# Patient Record
Sex: MALE | Race: WHITE | NOT HISPANIC OR LATINO | Employment: FULL TIME | URBAN - METROPOLITAN AREA
[De-identification: names, ages, dates, MRNs, and addresses within clinical notes are randomized per-mention and may not be internally consistent; named-entity substitution may affect disease eponyms.]

---

## 2017-05-26 ENCOUNTER — ALLSCRIPTS OFFICE VISIT (OUTPATIENT)
Dept: OTHER | Facility: OTHER | Age: 42
End: 2017-05-26

## 2017-08-30 ENCOUNTER — GENERIC CONVERSION - ENCOUNTER (OUTPATIENT)
Dept: OTHER | Facility: OTHER | Age: 42
End: 2017-08-30

## 2017-09-14 ENCOUNTER — GENERIC CONVERSION - ENCOUNTER (OUTPATIENT)
Dept: OTHER | Facility: OTHER | Age: 42
End: 2017-09-14

## 2017-09-14 LAB
A/G RATIO (HISTORICAL): 2 (ref 1.2–2.2)
ALBUMIN SERPL BCP-MCNC: 4.6 G/DL (ref 3.5–5.5)
ALP SERPL-CCNC: 51 IU/L (ref 39–117)
ALT SERPL W P-5'-P-CCNC: 32 IU/L (ref 0–44)
AST SERPL W P-5'-P-CCNC: 29 IU/L (ref 0–40)
BASOPHILS # BLD AUTO: 0 X10E3/UL (ref 0–0.2)
BASOPHILS # BLD AUTO: 1 %
BILIRUB SERPL-MCNC: 1.1 MG/DL (ref 0–1.2)
BUN SERPL-MCNC: 19 MG/DL (ref 6–24)
BUN/CREA RATIO (HISTORICAL): 20 (ref 9–20)
CALCIUM SERPL-MCNC: 10.3 MG/DL (ref 8.7–10.2)
CHLORIDE SERPL-SCNC: 102 MMOL/L (ref 96–106)
CHOLEST SERPL-MCNC: 213 MG/DL (ref 100–199)
CHOLEST/HDLC SERPL: 4.6 RATIO UNITS (ref 0–5)
CO2 SERPL-SCNC: 25 MMOL/L (ref 18–29)
CREAT SERPL-MCNC: 0.96 MG/DL (ref 0.76–1.27)
DEPRECATED RDW RBC AUTO: 13.3 % (ref 12.3–15.4)
EGFR AFRICAN AMERICAN (HISTORICAL): 113 ML/MIN/1.73
EGFR-AMERICAN CALC (HISTORICAL): 98 ML/MIN/1.73
EOSINOPHIL # BLD AUTO: 0.1 X10E3/UL (ref 0–0.4)
EOSINOPHIL # BLD AUTO: 2 %
GLUCOSE SERPL-MCNC: 90 MG/DL (ref 65–99)
HCT VFR BLD AUTO: 44.3 % (ref 37.5–51)
HDLC SERPL-MCNC: 46 MG/DL
HGB BLD-MCNC: 15.5 G/DL (ref 12.6–17.7)
IMM.GRANULOCYTES (CD4/8) (HISTORICAL): 0 %
IMM.GRANULOCYTES (CD4/8) (HISTORICAL): 0 X10E3/UL (ref 0–0.1)
INTERPRETATION (HISTORICAL): NORMAL
LDLC SERPL CALC-MCNC: 136 MG/DL (ref 0–99)
LYMPHOCYTES # BLD AUTO: 2.4 X10E3/UL (ref 0.7–3.1)
LYMPHOCYTES # BLD AUTO: 54 %
MCH RBC QN AUTO: 30.7 PG (ref 26.6–33)
MCHC RBC AUTO-ENTMCNC: 35 G/DL (ref 31.5–35.7)
MCV RBC AUTO: 88 FL (ref 79–97)
MONOCYTES # BLD AUTO: 0.3 X10E3/UL (ref 0.1–0.9)
MONOCYTES (HISTORICAL): 7 %
NEUTROPHILS # BLD AUTO: 1.6 X10E3/UL (ref 1.4–7)
NEUTROPHILS # BLD AUTO: 36 %
PLATELET # BLD AUTO: 147 X10E3/UL (ref 150–379)
POTASSIUM SERPL-SCNC: 4.6 MMOL/L (ref 3.5–5.2)
RBC (HISTORICAL): 5.05 X10E6/UL (ref 4.14–5.8)
SODIUM SERPL-SCNC: 142 MMOL/L (ref 134–144)
TOT. GLOBULIN, SERUM (HISTORICAL): 2.3 G/DL (ref 1.5–4.5)
TOTAL PROTEIN (HISTORICAL): 6.9 G/DL (ref 6–8.5)
TRIGL SERPL-MCNC: 157 MG/DL (ref 0–149)
VLDLC SERPL CALC-MCNC: 31 MG/DL (ref 5–40)
WBC # BLD AUTO: 4.5 X10E3/UL (ref 3.4–10.8)

## 2017-09-15 ENCOUNTER — GENERIC CONVERSION - ENCOUNTER (OUTPATIENT)
Dept: OTHER | Facility: OTHER | Age: 42
End: 2017-09-15

## 2017-09-22 ENCOUNTER — ALLSCRIPTS OFFICE VISIT (OUTPATIENT)
Dept: OTHER | Facility: OTHER | Age: 42
End: 2017-09-22

## 2017-10-26 ENCOUNTER — ALLSCRIPTS OFFICE VISIT (OUTPATIENT)
Dept: OTHER | Facility: OTHER | Age: 42
End: 2017-10-26

## 2017-10-27 NOTE — PROGRESS NOTES
Assessment  1  Benign essential hypertension (401 1) (I10)   2  Mixed hyperlipidemia (272 2) (E78 2)   3  Never a smoker    Plan  Benign essential hypertension    · Bystolic 5 MG Oral Tablet; 1 every day    Chief Complaint  f/u blood pressure check rmklpn      History of Present Illness  pt is here to follow bPdoing well no chest pain no sob      Review of Systems    Constitutional: No fever or chills, feels well, no tiredness, no recent weight gain or weight loss  Eyes: No complaints of eye pain, no red eyes, no discharge from eyes, no itchy eyes  ENT: no complaints of earache, no hearing loss, no nosebleeds, no nasal discharge, no sore throat, no hoarseness  Cardiovascular: No complaints of slow heart rate, no fast heart rate, no chest pain, no palpitations, no leg claudication, no lower extremity  Gastrointestinal: No complaints of abdominal pain, no constipation, no nausea or vomiting, no diarrhea or bloody stools  Active Problems  1  Benign essential hypertension (401 1) (I10)   2  BMI 38 0-38 9,adult (V85 38) (Z68 38)   3  Encounter for prostate cancer screening (V76 44) (Z12 5)   4  Fatty liver disease, nonalcoholic (877 0) (A25 0)   5  Mixed hyperlipidemia (272 2) (E78 2)   6  Need for vaccination (V05 9) (Z23)   7  Obesity (278 00) (E66 9)   8  Organic impotence (607 84) (N52 9)   9  Palpitations (785 1) (R00 2)   10  Screening for diabetes mellitus (DM) (V77 1) (Z13 1)   11  Screening for thyroid disorder (V77 0) (Z13 29)   12  Well adult on routine health check (V70 0) (Z00 00)    Past Medical History  1  History of _ (272 2)   2  History of Acute upper respiratory infection (465 9) (J06 9)   3  History of Closed nondisplaced fracture of proximal phalanx of lesser toe of left foot,   initial encounter (826 0) (S92 515A)   4  History of Finger deformity, left (736 20) (M20 002)   5  History of Fracture of finger, left, closed (816 00) (S62 609A)   6  History of fatigue (V13 89) (Z87 898)   7  History of nausea and vomiting (V12 79) (Z87 898)   8  History of neoplasm of uncertain behavior of skin (V13 3) (Z86 03)   9  History of sebaceous cyst (V13 3) (Z87 2)   10  History of Joint pain, knee (719 46) (M25 569)   11  History of Left foot pain (729 5) (M79 672)   12  History of Overweight (278 02) (E66 3)   13  History of Previously Well   15  History of Vitamin D deficiency (268 9) (E55 9)    The active problems and past medical history were reviewed and updated today  Surgical History  1  Denied: History of Reported Prior Surgical / Procedural History   2  History of Wrist Surgery    The surgical history was reviewed and updated today  Family History  Mother    1  Family history of arthritis (V17 7) (Z82 61)   2  Family history of hyperlipidemia (V18 19) (Z83 49)  Father    3  Family history of hypertension (V17 49) (Z82 49)   4  Denied: Family history of malignant neoplasm of prostate  Family History    5  Denied: Family history of Cancer   6  Family history of Diabetes Mellitus (V18 0)   7  Family history of Heart Disease (V17 49)   8  Family history of Mixed Hyperlipoproteinemia    The family history was reviewed and updated today  Social History   · Being A Social Drinker   · Never a smoker   · History of Never a smoker  The social history was reviewed and updated today  Current Meds   1  Atorvastatin Calcium 10 MG Oral Tablet; 1 Every Day At Bedtime; Therapy: 71ODZ1909 to (Last Kae Gloria)  Requested for: 10EXU8884 Ordered   2  Bystolic 5 MG Oral Tablet; 1 every day; Therapy: 77OIA1860 to (Last Kae Gloria)  Requested for: 91RXX5714 Ordered   3  Magnesium 500 MG Oral Tablet; 1 TABLEY DAILY; Therapy: 29VRB5329 to Recorded   4  Multivitamins Oral Capsule; 1 tablet daily; Therapy: 49FQK2006 to Recorded    The medication list was reviewed and updated today  Allergies  1   No Known Drug Allergies    Vitals  Vital Signs    Recorded: 10EGF8327 08:52AM   Temperature 97 2 F   Heart Rate 78   Respiration 18   Systolic 640   Diastolic 84   Height 6 ft 3 in   Weight 276 lb    BMI Calculated 34 5   BSA Calculated 2 52     Physical Exam    Constitutional   General appearance: No acute distress, well appearing and well nourished  Eyes   Conjunctiva and lids: No swelling, erythema, or discharge  Ears, Nose, Mouth, and Throat   External inspection of ears and nose: Normal     Otoscopic examination: Tympanic membrance translucent with normal light reflex  Canals patent without erythema  Pulmonary   Auscultation of lungs: Clear to auscultation, equal breath sounds bilaterally, no wheezes, no rales, no rhonci  Cardiovascular   Auscultation of heart: Normal rate and rhythm, normal S1 and S2, without murmurs  Abdomen   Abdomen: Non-tender, no masses           Future Appointments    Date/Time Provider Specialty Site   12/21/2017 09:15 AM Carolina Burnham DO Family Medicine [facility]     Signatures   Electronically signed by : Norberto Vargas DO; Oct 26 2017  9:11AM EST                       (Author)

## 2018-01-10 ENCOUNTER — GENERIC CONVERSION - ENCOUNTER (OUTPATIENT)
Dept: OTHER | Facility: OTHER | Age: 43
End: 2018-01-10

## 2018-01-10 LAB
A/G RATIO (HISTORICAL): 2 (ref 1.2–2.2)
ALBUMIN SERPL BCP-MCNC: 4.6 G/DL (ref 3.5–5.5)
ALP SERPL-CCNC: 54 IU/L (ref 39–117)
ALT SERPL W P-5'-P-CCNC: 33 IU/L (ref 0–44)
AST SERPL W P-5'-P-CCNC: 29 IU/L (ref 0–40)
BILIRUB SERPL-MCNC: 1.1 MG/DL (ref 0–1.2)
BUN SERPL-MCNC: 25 MG/DL (ref 6–24)
BUN/CREA RATIO (HISTORICAL): 28 (ref 9–20)
CALCIUM SERPL-MCNC: 9.5 MG/DL (ref 8.7–10.2)
CHLORIDE SERPL-SCNC: 100 MMOL/L (ref 96–106)
CHOLEST SERPL-MCNC: 188 MG/DL (ref 100–199)
CHOLEST/HDLC SERPL: 3.8 RATIO UNITS (ref 0–5)
CO2 SERPL-SCNC: 25 MMOL/L (ref 18–29)
CREAT SERPL-MCNC: 0.88 MG/DL (ref 0.76–1.27)
EGFR AFRICAN AMERICAN (HISTORICAL): 122 ML/MIN/1.73
EGFR-AMERICAN CALC (HISTORICAL): 106 ML/MIN/1.73
GLUCOSE SERPL-MCNC: 94 MG/DL (ref 65–99)
HDLC SERPL-MCNC: 49 MG/DL
LDLC SERPL CALC-MCNC: 102 MG/DL (ref 0–99)
POTASSIUM SERPL-SCNC: 4.2 MMOL/L (ref 3.5–5.2)
SODIUM SERPL-SCNC: 144 MMOL/L (ref 134–144)
TOT. GLOBULIN, SERUM (HISTORICAL): 2.3 G/DL (ref 1.5–4.5)
TOTAL PROTEIN (HISTORICAL): 6.9 G/DL (ref 6–8.5)
TRIGL SERPL-MCNC: 186 MG/DL (ref 0–149)
VLDLC SERPL CALC-MCNC: 37 MG/DL (ref 5–40)

## 2018-01-10 NOTE — RESULT NOTES
Verified Results  [facility]) CBC+Platelet+Hem Review [id number] 07:47AM [facility]     Test Name Result Flag Reference   WBC 3 8 x10E3/uL  3 4-10 8   RBC 4 80 x10E6/uL  4 14-5 80   Hemoglobin 14 8 g/dL  12 6-17 7   Hematocrit 43 5 %  37 5-51 0   MCV 91 fL  79-97   MCH 30 8 pg  26 6-33 0   MCHC 34 0 g/dL  31 5-35 7   RDW 13 1 %  12 3-15 4   Platelets 620 G10T1/ZN  150-379   Neutrophils 45 %     Lymphs 45 %     Monocytes 6 %     Eos 3 %     Basos 1 %     Neutrophils Absolute 1 7 X10E3/uL  1 4-7 0   Lymphs (Absolute) 1 7 X10E3/uL  0 7-3 1   Monocytes(Absolute) 0 2 X10E3/uL  0 1-0 9   Eos (Absolute Value) 0 1 X10E3/uL  0 0-0 4   Baso(Absolute) 0 0 X10E3/uL  0 0-0 2   RBC Comment RBC's appear normal   Normal   Platelet Comment Comment  Adequate   Platelet count verified by examination of peripheral blood smear         Discussion/Summary   labs acceptable

## 2018-01-11 ENCOUNTER — GENERIC CONVERSION - ENCOUNTER (OUTPATIENT)
Dept: OTHER | Facility: OTHER | Age: 43
End: 2018-01-11

## 2018-01-11 LAB — INTERPRETATION (HISTORICAL): NORMAL

## 2018-01-12 VITALS
RESPIRATION RATE: 16 BRPM | TEMPERATURE: 98.8 F | WEIGHT: 282.25 LBS | BODY MASS INDEX: 35.09 KG/M2 | HEART RATE: 90 BPM | HEIGHT: 75 IN | SYSTOLIC BLOOD PRESSURE: 160 MMHG | DIASTOLIC BLOOD PRESSURE: 98 MMHG

## 2018-01-12 NOTE — RESULT NOTES
Discussion/Summary   will discuss labs at follow up appt     Verified Results  (1) CBC/PLT/DIFF [id number] 07:59AM Stacy Wilmer     Test Name Result Flag Reference   WBC 4 5 x10E3/uL  3 4-10 8   RBC 5 05 x10E6/uL  4 14-5 80   Hemoglobin 15 5 g/dL  12 6-17 7   Hematocrit 44 3 %  37 5-51 0   MCV 88 fL  79-97   MCH 30 7 pg  26 6-33 0   MCHC 35 0 g/dL  31 5-35 7   RDW 13 3 %  12 3-15 4   Platelets 221 F85Z0/TI L 150-379   Neutrophils 36 %     Lymphs 54 %     Monocytes 7 %     Eos 2 %     Basos 1 %     Neutrophils (Absolute) 1 6 x10E3/uL  1 4-7 0   Lymphs (Absolute) 2 4 x10E3/uL  0 7-3 1   Monocytes(Absolute) 0 3 x10E3/uL  0 1-0 9   Eos (Absolute) 0 1 x10E3/uL  0 0-0 4   Baso (Absolute) 0 0 x10E3/uL  0 0-0 2   Immature Granulocytes 0 %     Immature Grans (Abs) 0 0 x10E3/uL  0 0-0 1     (1) COMPREHENSIVE METABOLIC PANEL [id number] 41:12NK [employer]     Test Name Result Flag Reference   Glucose, Serum 90 mg/dL  65-99   BUN 19 mg/dL  6-24   Creatinine, Serum 0 96 mg/dL  0 76-1 27   BUN/Creatinine Ratio 20  9-20   Sodium, Serum 142 mmol/L  134-144   Potassium, Serum 4 6 mmol/L  3 5-5 2   Chloride, Serum 102 mmol/L     Carbon Dioxide, Total 25 mmol/L  18-29   Calcium, Serum 10 3 mg/dL H 8 7-10 2   Protein, Total, Serum 6 9 g/dL  6 0-8 5   Albumin, Serum 4 6 g/dL  3 5-5 5   Globulin, Total 2 3 g/dL  1 5-4 5   A/G Ratio 2 0  1 2-2 2   Bilirubin, Total 1 1 mg/dL  0 0-1 2   Alkaline Phosphatase, S 51 IU/L     AST (SGOT) 29 IU/L  0-40   ALT (SGPT) 32 IU/L  0-44   eGFR If NonAfricn Am 98 mL/min/1 73  >59   eGFR If Africn Am 113 mL/min/1 73  >59     (1) LIPID PANEL, FASTING [id number] 07:59AM Stacy Guillen     Test Name Result Flag Reference   Cholesterol, Total 213 mg/dL H 100-199   Triglycerides 157 mg/dL H 0-149   HDL Cholesterol 46 mg/dL  >39   VLDL Cholesterol Landon 31 mg/dL  5-40   LDL Cholesterol Calc 136 mg/dL H 0-99   T  Chol/HDL Ratio 4 6 ratio units  0 0-5 0   T   Chol/HDL Ratio Men  Women                                               1/2 Avg  Risk  3 4    3 3                                                   Avg Risk  5 0    4 4                                                2X Avg  Risk  9 6    7 1                                                3X Avg  Risk 23 4   11 0     [facility]) Cardiovascular Risk Assessment [id number] 07:59AM Vinnie Miller     Test Name Result Flag Reference   Interpretation Note     Supplement report is available  PDF Image

## 2018-01-12 NOTE — RESULT NOTES
Verified Results  [facility]) CMP14+eGFR [id number] 07:47AM [facility]     Test Name Result Flag Reference   Glucose, Serum 105 mg/dL H 65-99   BUN 17 mg/dL  6-24   Creatinine, Serum 0 90 mg/dL  0 76-1 27   eGFR If NonAfricn Am 106 mL/min/1 73  >59   eGFR If Africn Am 123 mL/min/1 73  >59   BUN/Creatinine Ratio 19  9-20   Sodium, Serum 144 mmol/L  134-144   Potassium, Serum 4 3 mmol/L  3 5-5 2   Chloride, Serum 106 mmol/L     Carbon Dioxide, Total 22 mmol/L  18-29   Calcium, Serum 9 9 mg/dL  8 7-10 2   Protein, Total, Serum 7 0 g/dL  6 0-8 5   Albumin, Serum 4 8 g/dL  3 5-5 5   Globulin, Total 2 2 g/dL  1 5-4 5   A/G Ratio 2 2  1 1-2 5   Bilirubin, Total 0 7 mg/dL  0 0-1 2   Alkaline Phosphatase, S 74 IU/L     AST (SGOT) 36 IU/L  0-40   ALT (SGPT) 51 IU/L H 0-44     (LC) Lipid Panel With LDL/HDL Ratio [id number] 07:47AM [facility]     Test Name Result Flag Reference   Cholesterol, Total 211 mg/dL H 100-199   Triglycerides 139 mg/dL  0-149   HDL Cholesterol 45 mg/dL  >39   According to ATP-III Guidelines, HDL-C >59 mg/dL is considered a  negative risk factor for CHD  VLDL Cholesterol Landon 28 mg/dL  5-40   LDL Cholesterol Calc 138 mg/dL H 0-99   LDL/HDL Ratio 3 1 ratio units  0 0-3 6   LDL/HDL Ratio                                                             Men  Women                                               1/2 Avg  Risk  1 0    1 5                                                   Avg Risk  3 6    3 2                                                2X Avg  Risk  6 2    5 0                                                3X Avg  Risk  8 0    6 1     (LC) PSA Total (Reflex To Free) [id number] 07:47AM [facility]     Test Name Result Flag Reference   Prostate Specific Ag, Serum 0 4 ng/mL  0 0-4 0   Roche ECLIA methodology  According to the American Urological Association, Serum PSA should  decrease and remain at undetectable levels after radical  prostatectomy   The AUA defines biochemical recurrence as an initial  PSA value 0 2 ng/mL or greater followed by a subsequent confirmatory  PSA value 0 2 ng/mL or greater  Values obtained with different assay methods or kits cannot be used  interchangeably  Results cannot be interpreted as absolute evidence  of the presence or absence of malignant disease  Reflex Criteria Comment     The percent free PSA is performed on a reflex basis only when the  total PSA is between 4 0 and 10 0 ng/mL       [facility]) TSH+Free T4 [id number] 07:47AM Stacy Guillen     Test Name Result Flag Reference   TSH 1 420 uIU/mL  0 450-4 500   T4,Free(Direct) 1 22 ng/dL  0 82-1 77       Discussion/Summary   Will discuss labs at follow up appt     Signatures   Electronically signed by : Catarina Bloch, DO; Feb 10 2016  9:54AM EST                       (Author)

## 2018-01-13 VITALS
TEMPERATURE: 97.2 F | BODY MASS INDEX: 34.32 KG/M2 | DIASTOLIC BLOOD PRESSURE: 84 MMHG | WEIGHT: 276 LBS | HEIGHT: 75 IN | SYSTOLIC BLOOD PRESSURE: 126 MMHG | RESPIRATION RATE: 18 BRPM | HEART RATE: 78 BPM

## 2018-01-14 NOTE — RESULT NOTES
Verified Results  [facility]) Cardiovascular Risk Assessment [id number] 07:47AM Steve Carrillo     Test Name Result Flag Reference   Interpretation Note     Supplement report is available  PDF Image            Signatures   Electronically signed by : Berenice Zarate DO; Feb 11 2016  9:48AM EST                       (Author)

## 2018-01-14 NOTE — PROGRESS NOTES
Assessment    1  Never a smoker   2  Benign essential hypertension (401 1) (I10)   3  Palpitations (785 1) (R00 2)   4  Fatty liver disease, nonalcoholic (650 3) (T59 9)   5  Mixed hyperlipidemia (272 2) (E78 2)    Plan  Benign essential hypertension    · Bystolic 5 MG Oral Tablet; 1 every day   · Follow-up visit in 1 month Evaluation and Treatment  Follow-up  Status: Hold For -  Scheduling  Requested for: 43Ffj0954  Mixed hyperlipidemia    · Atorvastatin Calcium 10 MG Oral Tablet; 1 Every Day At Bedtime   · (1) COMPREHENSIVE METABOLIC PANEL; Status:Active; Requested for:84Pjj6778;    · (1) LIPID PANEL, FASTING; Status:Active; Requested for:23Prk0438;    · Follow-up visit in 3 months Evaluation and Treatment  Follow-up  Status: Hold For -  Scheduling  Requested for: 73DVD1233  Palpitations    · EKG/ECG- POC; Status:Complete;   Done: 47OHU8133 09:56AM    Discussion/Summary    Will get ekg for the palpitations  would suggest BB for his blood pressure    i will sit on the palps as they may happen once or twice over a number of month  the bb will probaby quell this  Chief Complaint  pt present for CPE, no forms  af/rma      History of Present Illness  HM, Adult Male: The patient is being seen for a health maintenance evaluation  General Health:   Screening:   HPI: pt is here for a full physical  no new issues at this time    pt states over the last few months he complains of having palpitations in is chest randomly that come on and go away after a few seconds  Review of Systems    Constitutional: No fever or chills, feels well, no tiredness, no recent weight gain or weight loss  Eyes: No complaints of eye pain, no red eyes, no discharge from eyes, no itchy eyes  ENT: no complaints of earache, no hearing loss, no nosebleeds, no nasal discharge, no sore throat, no hoarseness  Cardiovascular: palpitations, but as noted in HPI and no chest pain     Respiratory: No complaints of shortness of breath, no wheezing, no cough, no SOB on exertion, no orthopnea or PND  Gastrointestinal: No complaints of abdominal pain, no constipation, no nausea or vomiting, no diarrhea or bloody stools  Genitourinary: No complaints of dysuria, no incontinence, no hesitancy, no nocturia, no genital lesion, no testicular pain  Musculoskeletal: No complaints of arthralgia, no myalgias, no joint swelling or stiffness, no limb pain or swelling  Active Problems    1  Benign essential hypertension (401 1) (I10)   2  BMI 38 0-38 9,adult (V85 38) (Z68 38)   3  Encounter for prostate cancer screening (V76 44) (Z12 5)   4  Fatty liver disease, nonalcoholic (947 6) (T02 0)   5  Mixed hyperlipidemia (272 2) (E78 2)   6  Need for vaccination (V05 9) (Z23)   7  Obesity (278 00) (E66 9)   8  Organic impotence (607 84) (N52 9)   9  Screening for diabetes mellitus (DM) (V77 1) (Z13 1)   10  Screening for thyroid disorder (V77 0) (Z13 29)   11   Well adult on routine health check (V70 0) (Z00 00)    Past Medical History    · History of _ (272 2)   · History of Acute upper respiratory infection (465 9) (J06 9)   · History of Closed nondisplaced fracture of proximal phalanx of lesser toe of left foot,  initial encounter (826 0) (S92 515A)   · History of Finger deformity, left (736 20) (M20 002)   · History of Fracture of finger, left, closed (816 00) (S62 609A)   · History of fatigue (V13 89) (V23 356)   · History of nausea and vomiting (V12 79) (M61 821)   · History of neoplasm of uncertain behavior of skin (V13 3) (Z86 03)   · History of sebaceous cyst (V13 3) (Z87 2)   · History of Joint pain, knee (719 46) (M25 569)   · History of Left foot pain (729 5) (M79 672)   · History of Overweight (278 02) (E66 3)   · History of Previously Well   · History of Vitamin D deficiency (268 9) (E55 9)    Surgical History    · Denied: History of Reported Prior Surgical / Procedural History   · History of Wrist Surgery    Family History  Mother    · Family history of arthritis (V17 7) (Z82 61)   · Family history of hyperlipidemia (V18 19) (Z83 49)  Father    · Family history of hypertension (V17 49) (Z82 49)   · Denied: Family history of malignant neoplasm of prostate  Family History    · Denied: Family history of Cancer   · Family history of Diabetes Mellitus (V18 0)   · Family history of Heart Disease (V17 49)   · Family history of Mixed Hyperlipoproteinemia    Social History    · Being A Social Drinker   · Never a smoker    Current Meds   1  Magnesium 500 MG Oral Tablet; 1 TABLEY DAILY; Therapy: 25YFG6150 to Recorded   2  Multivitamins Oral Capsule; 1 tablet daily; Therapy: 90WYY5495 to Recorded    Allergies    1  No Known Drug Allergies    Vitals   Recorded: 74XPY8556 09:20AM   Temperature 96 8 F   Heart Rate 82   Respiration 16   Systolic 431   Diastolic 88   Height 6 ft 3 in   Weight 276 lb    BMI Calculated 34 5   BSA Calculated 2 52     Physical Exam    Constitutional   General appearance: No acute distress, well appearing and well nourished  Head and Face   Head and face: Normal     Palpation of the face and sinuses: No sinus tenderness  Eyes   Conjunctiva and lids: No erythema, swelling or discharge  Pupils and irises: Equal, round, reactive to light  Ears, Nose, Mouth, and Throat   Otoscopic examination: Tympanic membranes translucent with normal light reflex  Canals patent without erythema  Neck   Neck: Supple, symmetric, trachea midline, no masses  Pulmonary   Respiratory effort: No increased work of breathing or signs of respiratory distress  Percussion of chest: Normal     Cardiovascular   Palpation of heart: Normal PMI, no thrills  Auscultation of heart: Normal rate and rhythm, normal S1 and S2, no murmurs  Abdomen   Abdomen: Non-tender, no masses  Liver and spleen: No hepatomegaly or splenomegaly  Lymphatic   Palpation of lymph nodes in neck: No lymphadenopathy      Palpation of lymph nodes in axillae: No lymphadenopathy  Palpation of lymph nodes in groin: No lymphadenopathy  Palpation of lymph nodes in other areas: No lymphadenopathy  Musculoskeletal   Gait and station: Normal     Inspection/palpation of digits and nails: Normal without clubbing or cyanosis  Inspection/palpation of joints, bones, and muscles: Normal     Skin   Skin and subcutaneous tissue: Normal without rashes or lesions         Results/Data  Parag [id number] 09:58AM [facility]     Test Name Result Flag Reference   [place] - Ten Year 2 9 %       EKG/ECG- POC [id number] 09:56AM [facility]     Test Name Result Flag Reference   EKG/ECG      NSR no signs of ischemia     (1) CBC/PLT/DIFF [id number] 07:59AM [facility] [facility]     Test Name Result Flag Reference   WBC 4 5 x10E3/uL  3 4-10 8   RBC 5 05 x10E6/uL  4 14-5 80   Hemoglobin 15 5 g/dL  12 6-17 7   Hematocrit 44 3 %  37 5-51 0   MCV 88 fL  79-97   MCH 30 7 pg  26 6-33 0   MCHC 35 0 g/dL  31 5-35 7   RDW 13 3 %  12 3-15 4   Platelets 035 S56Z6/CG L 150-379   Neutrophils 36 %     Lymphs 54 %     Monocytes 7 %     Eos 2 %     Basos 1 %     Neutrophils (Absolute) 1 6 x10E3/uL  1 4-7 0   Lymphs (Absolute) 2 4 x10E3/uL  0 7-3 1   Monocytes(Absolute) 0 3 x10E3/uL  0 1-0 9   Eos (Absolute) 0 1 x10E3/uL  0 0-0 4   Baso (Absolute) 0 0 x10E3/uL  0 0-0 2   Immature Granulocytes 0 %     Immature Grans (Abs) 0 0 x10E3/uL  0 0-0 1     (1) COMPREHENSIVE METABOLIC PANEL [id number] 12:47MB [employer]     Test Name Result Flag Reference   Glucose, Serum 90 mg/dL  65-99   BUN 19 mg/dL  6-24   Creatinine, Serum 0 96 mg/dL  0 76-1 27   BUN/Creatinine Ratio 20  9-20   Sodium, Serum 142 mmol/L  134-144   Potassium, Serum 4 6 mmol/L  3 5-5 2   Chloride, Serum 102 mmol/L     Carbon Dioxide, Total 25 mmol/L  18-29   Calcium, Serum 10 3 mg/dL H 8 7-10 2   Protein, Total, Serum 6 9 g/dL  6 0-8 5   Albumin, Serum 4 6 g/dL  3 5-5 5   Globulin, Total 2 3 g/dL  1 5-4 5   A/G Ratio 2 0  1 2-2 2   Bilirubin, Total 1 1 mg/dL  0 0-1 2   Alkaline Phosphatase, S 51 IU/L     AST (SGOT) 29 IU/L  0-40   ALT (SGPT) 32 IU/L  0-44   eGFR If NonAfricn Am 98 mL/min/1 73  >59   eGFR If Africn Am 113 mL/min/1 73  >59     (1) LIPID PANEL, FASTING [id number] 07:59AM [facility]     Test Name Result Flag Reference   Cholesterol, Total 213 mg/dL H 100-199   Triglycerides 157 mg/dL H 0-149   HDL Cholesterol 46 mg/dL  >39   VLDL Cholesterol Landon 31 mg/dL  5-40   LDL Cholesterol Calc 136 mg/dL H 0-99   T  Chol/HDL Ratio 4 6 ratio units  0 0-5 0   T  Chol/HDL Ratio                                                             Men  Women                                               1/2 Avg  Risk  3 4    3 3                                                   Avg Risk  5 0    4 4                                                2X Avg  Risk  9 6    7 1                                                3X Avg  Risk 23 4   11 0       Procedure    Procedure: Visual Acuity Test    Inforrmation supplied by a Snellen chart     Results: 20/13 in both eyes without corrective device, 20/15 in the right eye without corrective device, 20/15 in the left eye without corrective device      Signatures   Electronically signed by : Curly Harden DO; Sep 22 2017 10:01AM EST                       (Author)

## 2018-01-15 NOTE — RESULT NOTES
Verified Results  [facility]) CMP14+eGFR [id number] 07:42AM Stacy Guillen     Test Name Result Flag Reference   Glucose, Serum 105 mg/dL H 65-99   BUN 19 mg/dL  6-24   Creatinine, Serum 1 04 mg/dL  0 76-1 27   eGFR If NonAfricn Am 89 mL/min/1 73  >59   eGFR If Africn Am 103 mL/min/1 73  >59   BUN/Creatinine Ratio 18  9-20   Sodium, Serum 141 mmol/L  134-144   Potassium, Serum 3 7 mmol/L  3 5-5 2   Chloride, Serum 103 mmol/L     Carbon Dioxide, Total 22 mmol/L  18-29   Calcium, Serum 10 0 mg/dL  8 7-10 2   Protein, Total, Serum 6 7 g/dL  6 0-8 5   Albumin, Serum 4 5 g/dL  3 5-5 5   Globulin, Total 2 2 g/dL  1 5-4 5   A/G Ratio 2 0  1 1-2 5   Bilirubin, Total 1 0 mg/dL  0 0-1 2   Alkaline Phosphatase, S 59 IU/L     AST (SGOT) 34 IU/L  0-40   ALT (SGPT) 44 IU/L  0-44     (LC) Lipid Panel With LDL/HDL Ratio [id number] 07:42AM Stacy Wilmer     Test Name Result Flag Reference   Cholesterol, Total 176 mg/dL  100-199   Triglycerides 153 mg/dL H 0-149   HDL Cholesterol 43 mg/dL  >39   According to ATP-III Guidelines, HDL-C >59 mg/dL is considered a  negative risk factor for CHD  VLDL Cholesterol Landon 31 mg/dL  5-40   LDL Cholesterol Calc 102 mg/dL H 0-99   LDL/HDL Ratio 2 4 ratio units  0 0-3 6   LDL/HDL Ratio                                                             Men  Women                                               1/2 Avg  Risk  1 0    1 5                                                   Avg Risk  3 6    3 2                                                2X Avg  Risk  6 2    5 0                                                3X Avg  Risk  8 0    6 1     [facility]) Cardiovascular Risk Assessment [id number] 07:42AM Stacy Wilmer     Test Name Result Flag Reference   Interpretation Note     Supplement report is available  PDF Image            Discussion/Summary   Will discuss labs at follow up appt

## 2018-01-16 ENCOUNTER — GENERIC CONVERSION - ENCOUNTER (OUTPATIENT)
Dept: OTHER | Facility: OTHER | Age: 43
End: 2018-01-16

## 2018-01-22 VITALS
WEIGHT: 276 LBS | BODY MASS INDEX: 34.32 KG/M2 | SYSTOLIC BLOOD PRESSURE: 142 MMHG | RESPIRATION RATE: 16 BRPM | HEIGHT: 75 IN | HEART RATE: 82 BPM | TEMPERATURE: 96.8 F | DIASTOLIC BLOOD PRESSURE: 88 MMHG

## 2018-01-24 VITALS
SYSTOLIC BLOOD PRESSURE: 140 MMHG | RESPIRATION RATE: 18 BRPM | BODY MASS INDEX: 34.57 KG/M2 | HEIGHT: 75 IN | WEIGHT: 278 LBS | HEART RATE: 76 BPM | DIASTOLIC BLOOD PRESSURE: 80 MMHG | TEMPERATURE: 98 F

## 2018-02-15 ENCOUNTER — HOSPITAL ENCOUNTER (EMERGENCY)
Facility: HOSPITAL | Age: 43
Discharge: HOME | End: 2018-02-15
Attending: EMERGENCY MEDICINE
Payer: COMMERCIAL

## 2018-02-15 ENCOUNTER — APPOINTMENT (EMERGENCY)
Dept: NON INVASIVE DIAGNOSTICS | Facility: HOSPITAL | Age: 43
End: 2018-02-15
Attending: INTERNAL MEDICINE
Payer: COMMERCIAL

## 2018-02-15 ENCOUNTER — APPOINTMENT (EMERGENCY)
Dept: RADIOLOGY | Facility: HOSPITAL | Age: 43
End: 2018-02-15
Payer: COMMERCIAL

## 2018-02-15 ENCOUNTER — APPOINTMENT (EMERGENCY)
Dept: NON INVASIVE DIAGNOSTICS | Facility: HOSPITAL | Age: 43
End: 2018-02-15
Payer: COMMERCIAL

## 2018-02-15 VITALS
OXYGEN SATURATION: 96 % | SYSTOLIC BLOOD PRESSURE: 146 MMHG | BODY MASS INDEX: 34.25 KG/M2 | TEMPERATURE: 97.4 F | WEIGHT: 274 LBS | HEART RATE: 46 BPM | RESPIRATION RATE: 15 BRPM | DIASTOLIC BLOOD PRESSURE: 83 MMHG

## 2018-02-15 DIAGNOSIS — I48.91 ATRIAL FIBRILLATION (HCC): Primary | ICD-10-CM

## 2018-02-15 LAB
ALBUMIN SERPL BCP-MCNC: 4.6 G/DL (ref 3.5–5)
ALP SERPL-CCNC: 67 U/L (ref 46–116)
ALT SERPL W P-5'-P-CCNC: 46 U/L (ref 12–78)
AMPHETAMINES SERPL QL SCN: NEGATIVE
ANION GAP SERPL CALCULATED.3IONS-SCNC: 8 MMOL/L (ref 4–13)
APTT PPP: 25 SECONDS (ref 23–35)
AST SERPL W P-5'-P-CCNC: 27 U/L (ref 5–45)
BARBITURATES UR QL: NEGATIVE
BASOPHILS # BLD AUTO: 0 THOUSANDS/ΜL (ref 0–0.1)
BASOPHILS NFR BLD AUTO: 1 % (ref 0–1)
BENZODIAZ UR QL: NEGATIVE
BILIRUB SERPL-MCNC: 0.9 MG/DL (ref 0.2–1)
BUN SERPL-MCNC: 21 MG/DL (ref 5–25)
CALCIUM SERPL-MCNC: 10 MG/DL (ref 8.3–10.1)
CHLORIDE SERPL-SCNC: 107 MMOL/L (ref 100–108)
CO2 SERPL-SCNC: 29 MMOL/L (ref 21–32)
COCAINE UR QL: NEGATIVE
CREAT SERPL-MCNC: 0.99 MG/DL (ref 0.6–1.3)
EOSINOPHIL # BLD AUTO: 0.1 THOUSAND/ΜL (ref 0–0.61)
EOSINOPHIL NFR BLD AUTO: 2 % (ref 0–6)
ERYTHROCYTE [DISTWIDTH] IN BLOOD BY AUTOMATED COUNT: 13.3 % (ref 11.6–15.1)
GFR SERPL CREATININE-BSD FRML MDRD: 94 ML/MIN/1.73SQ M
GLUCOSE SERPL-MCNC: 104 MG/DL (ref 65–140)
HCT VFR BLD AUTO: 48.2 % (ref 42–52)
HGB BLD-MCNC: 16 G/DL (ref 14–18)
INR PPP: 1.04 (ref 0.86–1.16)
LYMPHOCYTES # BLD AUTO: 3.6 THOUSANDS/ΜL (ref 0.6–4.47)
LYMPHOCYTES NFR BLD AUTO: 57 % (ref 14–44)
MAGNESIUM SERPL-MCNC: 2.2 MG/DL (ref 1.6–2.6)
MCH RBC QN AUTO: 31 PG (ref 27–31)
MCHC RBC AUTO-ENTMCNC: 33.3 G/DL (ref 31.4–37.4)
MCV RBC AUTO: 93 FL (ref 82–98)
METHADONE UR QL: NEGATIVE
MONOCYTES # BLD AUTO: 0.5 THOUSAND/ΜL (ref 0.17–1.22)
MONOCYTES NFR BLD AUTO: 7 % (ref 4–12)
NEUTROPHILS # BLD AUTO: 2.2 THOUSANDS/ΜL (ref 1.85–7.62)
NEUTS SEG NFR BLD AUTO: 34 % (ref 43–75)
NRBC BLD AUTO-RTO: 0 /100 WBCS
OPIATES UR QL SCN: NEGATIVE
PCP UR QL: NEGATIVE
PLATELET # BLD AUTO: 156 THOUSANDS/UL (ref 130–400)
PLATELET BLD QL SMEAR: ADEQUATE
PMV BLD AUTO: 9 FL (ref 8.9–12.7)
POTASSIUM SERPL-SCNC: 3.4 MMOL/L (ref 3.5–5.3)
PROT SERPL-MCNC: 7.6 G/DL (ref 6.4–8.2)
PROTHROMBIN TIME: 10.9 SECONDS (ref 9.4–11.7)
RBC # BLD AUTO: 5.18 MILLION/UL (ref 4.7–6.1)
SODIUM SERPL-SCNC: 144 MMOL/L (ref 136–145)
THC UR QL: NEGATIVE
TROPONIN I SERPL-MCNC: <0.02 NG/ML
TROPONIN I SERPL-MCNC: <0.02 NG/ML
TSH SERPL DL<=0.05 MIU/L-ACNC: 3.8 UIU/ML (ref 0.36–3.74)
WBC # BLD AUTO: 6.4 THOUSAND/UL (ref 4.8–10.8)

## 2018-02-15 PROCEDURE — 96360 HYDRATION IV INFUSION INIT: CPT

## 2018-02-15 PROCEDURE — 99285 EMERGENCY DEPT VISIT HI MDM: CPT

## 2018-02-15 PROCEDURE — 93306 TTE W/DOPPLER COMPLETE: CPT | Performed by: INTERNAL MEDICINE

## 2018-02-15 PROCEDURE — 93005 ELECTROCARDIOGRAM TRACING: CPT | Performed by: EMERGENCY MEDICINE

## 2018-02-15 PROCEDURE — 93017 CV STRESS TEST TRACING ONLY: CPT

## 2018-02-15 PROCEDURE — 36415 COLL VENOUS BLD VENIPUNCTURE: CPT | Performed by: EMERGENCY MEDICINE

## 2018-02-15 PROCEDURE — 93016 CV STRESS TEST SUPVJ ONLY: CPT | Performed by: INTERNAL MEDICINE

## 2018-02-15 PROCEDURE — 96372 THER/PROPH/DIAG INJ SC/IM: CPT

## 2018-02-15 PROCEDURE — 99284 EMERGENCY DEPT VISIT MOD MDM: CPT | Performed by: INTERNAL MEDICINE

## 2018-02-15 PROCEDURE — 71045 X-RAY EXAM CHEST 1 VIEW: CPT

## 2018-02-15 PROCEDURE — 80307 DRUG TEST PRSMV CHEM ANLYZR: CPT | Performed by: EMERGENCY MEDICINE

## 2018-02-15 PROCEDURE — 80053 COMPREHEN METABOLIC PANEL: CPT | Performed by: EMERGENCY MEDICINE

## 2018-02-15 PROCEDURE — 84484 ASSAY OF TROPONIN QUANT: CPT | Performed by: EMERGENCY MEDICINE

## 2018-02-15 PROCEDURE — 93018 CV STRESS TEST I&R ONLY: CPT | Performed by: INTERNAL MEDICINE

## 2018-02-15 PROCEDURE — 83735 ASSAY OF MAGNESIUM: CPT | Performed by: EMERGENCY MEDICINE

## 2018-02-15 PROCEDURE — 93005 ELECTROCARDIOGRAM TRACING: CPT

## 2018-02-15 PROCEDURE — 96361 HYDRATE IV INFUSION ADD-ON: CPT

## 2018-02-15 PROCEDURE — 85610 PROTHROMBIN TIME: CPT | Performed by: EMERGENCY MEDICINE

## 2018-02-15 PROCEDURE — 93306 TTE W/DOPPLER COMPLETE: CPT

## 2018-02-15 PROCEDURE — 84443 ASSAY THYROID STIM HORMONE: CPT | Performed by: EMERGENCY MEDICINE

## 2018-02-15 PROCEDURE — 85730 THROMBOPLASTIN TIME PARTIAL: CPT | Performed by: EMERGENCY MEDICINE

## 2018-02-15 PROCEDURE — 85025 COMPLETE CBC W/AUTO DIFF WBC: CPT | Performed by: EMERGENCY MEDICINE

## 2018-02-15 RX ORDER — FLECAINIDE ACETATE 50 MG/1
50 TABLET ORAL EVERY 12 HOURS SCHEDULED
Status: DISCONTINUED | OUTPATIENT
Start: 2018-02-15 | End: 2018-02-15 | Stop reason: HOSPADM

## 2018-02-15 RX ORDER — NEBIVOLOL 10 MG/1
TABLET ORAL DAILY
COMMUNITY
Start: 2017-09-22 | End: 2018-02-22

## 2018-02-15 RX ORDER — FLECAINIDE ACETATE 50 MG/1
50 TABLET ORAL 2 TIMES DAILY
Qty: 28 TABLET | Refills: 0 | Status: SHIPPED | OUTPATIENT
Start: 2018-02-15 | End: 2018-03-05 | Stop reason: SDUPTHER

## 2018-02-15 RX ORDER — POTASSIUM CHLORIDE 20 MEQ/1
40 TABLET, EXTENDED RELEASE ORAL ONCE
Status: COMPLETED | OUTPATIENT
Start: 2018-02-15 | End: 2018-02-15

## 2018-02-15 RX ORDER — ATORVASTATIN CALCIUM 20 MG/1
TABLET, FILM COATED ORAL DAILY
COMMUNITY
Start: 2017-09-22 | End: 2018-08-21 | Stop reason: SDUPTHER

## 2018-02-15 RX ORDER — FOLIC ACID 0.8 MG
TABLET ORAL
COMMUNITY
Start: 2017-09-22

## 2018-02-15 RX ORDER — ASPIRIN 81 MG/1
324 TABLET, CHEWABLE ORAL DAILY
Qty: 20 TABLET | Refills: 0 | Status: SHIPPED | OUTPATIENT
Start: 2018-02-15 | End: 2020-03-04

## 2018-02-15 RX ADMIN — ENOXAPARIN SODIUM 120 MG: 120 INJECTION SUBCUTANEOUS at 05:05

## 2018-02-15 RX ADMIN — Medication 400 MG: at 11:04

## 2018-02-15 RX ADMIN — POTASSIUM CHLORIDE 40 MEQ: 1500 TABLET, EXTENDED RELEASE ORAL at 11:04

## 2018-02-15 RX ADMIN — SODIUM CHLORIDE 1000 ML: 0.9 INJECTION, SOLUTION INTRAVENOUS at 03:36

## 2018-02-15 RX ADMIN — FLECAINIDE ACETATE 50 MG: 50 TABLET ORAL at 11:02

## 2018-02-15 NOTE — ED NOTES
Awake , alert   Denies pain  Heart rate 50   States he is a runner  Resp regular and easy       Rodrick Hay, ROE  02/15/18 1412

## 2018-02-15 NOTE — DISCHARGE INSTRUCTIONS
A-fib (Atrial Fibrillation)   WHAT YOU NEED TO KNOW:   A-fib may come and go, or it may be a long-term condition  A-fib can cause blood clots, stroke, or heart failure  These conditions may become life-threatening  It is important to treat and manage a-fib to help prevent a blood clot, stroke, or heart failure  DISCHARGE INSTRUCTIONS:   Call 911 for any of the following:   · You have any of the following signs of a heart attack:      ¨ Squeezing, pressure, or pain in your chest that lasts longer than 5 minutes or returns    ¨ Discomfort or pain in your back, neck, jaw, stomach, or arm     ¨ Trouble breathing    ¨ Nausea or vomiting    ¨ Lightheadedness or a sudden cold sweat, especially with chest pain or trouble breathing    · You have any of the following signs of a stroke:      ¨ Numbness or drooping on one side of your face     ¨ Weakness in an arm or leg    ¨ Confusion or difficulty speaking    ¨ Dizziness, a severe headache, or vision loss  Return to the emergency department if:  You have any of the following signs of a blood clot:  · You feel lightheaded, are short of breath, and have chest pain  · You cough up blood  · You have swelling, redness, pain, or warmth in your arm or leg  Contact your cardiologist or healthcare provider if:   · Your target heart rate is not in the range it should be  · You have new or worsening swelling in your legs, feet, ankles, or abdomen  · You are short of breath, even at rest      · You have questions or concerns about your condition or care  Medicines: You may need any of the following:  · Heart medicines  help control your heart rate and rhythm  You may need more than one medicine to treat your symptoms  · Blood thinners    help prevent blood clots  Examples of blood thinners include heparin and warfarin  Clots can cause strokes, heart attacks, and death   The following are general safety guidelines to follow while you are taking a blood thinner:    ¨ Watch for bleeding and bruising while you take blood thinners  Watch for bleeding from your gums or nose  Watch for blood in your urine and bowel movements  Use a soft washcloth on your skin, and a soft toothbrush to brush your teeth  This can keep your skin and gums from bleeding  If you shave, use an electric shaver  Do not play contact sports  ¨ Tell your dentist and other healthcare providers that you take anticoagulants  Wear a bracelet or necklace that says you take this medicine  ¨ Do not start or stop any medicines unless your healthcare provider tells you to  Many medicines cannot be used with blood thinners  ¨ Tell your healthcare provider right away if you forget to take the medicine, or if you take too much  ¨ Warfarin  is a blood thinner that you may need to take  The following are things you should be aware of if you take warfarin  § Foods and medicines can affect the amount of warfarin in your blood  Do not make major changes to your diet while you take warfarin  Warfarin works best when you eat about the same amount of vitamin K every day  Vitamin K is found in green leafy vegetables and certain other foods  Ask for more information about what to eat when you are taking warfarin  § You will need to see your healthcare provider for follow-up visits when you are on warfarin  You will need regular blood tests  These tests are used to decide how much medicine you need  · Antiplatelets , such as aspirin, help prevent blood clots  Take your antiplatelet medicine exactly as directed  These medicines make it more likely for you to bleed or bruise  If you are told to take aspirin, do not take acetaminophen or ibuprofen instead  · Take your medicine as directed  Contact your healthcare provider if you think your medicine is not helping or if you have side effects  Tell him or her if you are allergic to any medicine   Keep a list of the medicines, vitamins, and herbs you take  Include the amounts, and when and why you take them  Bring the list or the pill bottles to follow-up visits  Carry your medicine list with you in case of an emergency  Follow up with your cardiologist as directed: You will need regular blood tests and monitoring  Write down your questions so you remember to ask them during your visits  Manage A-fib:   · Know your target heart rate  Learn how to take your pulse and monitor your heart rate  · Manage other health conditions  This includes high blood pressure, sleep apnea, thyroid disease, diabetes, and other heart conditions  Take medicine as directed and follow your treatment plan  · Limit or do not drink alcohol  Alcohol can make a-fib hard to manage  Ask your healthcare provider if it is safe for you to drink alcohol  A drink of alcohol is 12 ounces of beer, 5 ounces of wine, or 1½ ounces of liquor  · Do not smoke  Nicotine and other chemicals in cigarettes and cigars can cause heart and lung damage  Ask your healthcare provider for information if you currently smoke and need help to quit  E-cigarettes or smokeless tobacco still contain nicotine  Talk to your healthcare provider before you use these products  · Eat heart-healthy foods  Heart healthy foods will help keep your cholesterol low  These include fruits, vegetables, whole-grain breads, low-fat dairy products, beans, lean meats, and fish  Replace butter and margarine with heart-healthy oils such as olive oil and canola oil  · Maintain a healthy weight  Ask your healthcare provider how much you should weigh  Ask him to help you create a weight loss plan if you are overweight  · Exercise for 30 minutes  most days of the week  Ask your healthcare provider about the best exercise plan for you  © 2017 [address] [employer] Information is for End User's use only and may not be sold, redistributed or otherwise used for commercial purposes   All illustrations and images included in CareNotes® are the copyrighted property of [employer] M [employer]  or Clint Valenzuela  The above information is an  only  It is not intended as medical advice for individual conditions or treatments  Talk to your doctor, nurse or pharmacist before following any medical regimen to see if it is safe and effective for you

## 2018-02-15 NOTE — ED PROVIDER NOTES
History  Chief Complaint   Patient presents with    Palpitations     Pt states that he woke up about 30 hour ago with palpitations, pt state this has happened before but it is normally brief  Pt in ER with palpitations that woke him from sleep  Pt with a hx of similar symptoms - transient and with no outpt workup  He denies chest pain or SOB            Prior to Admission Medications   Prescriptions Last Dose Informant Patient Reported? Taking? Magnesium 500 MG CAPS   Yes Yes   Sig: Take by mouth   Multiple Vitamin (MULTI VITAMIN DAILY PO)   Yes Yes   Sig: Take 1 tablet by mouth daily   atorvastatin (LIPITOR) 20 mg tablet   Yes Yes   Sig: Take by mouth daily     nebivolol (BYSTOLIC) 10 mg tablet   Yes Yes   Sig: Take by mouth daily      Facility-Administered Medications: None       Past Medical History:   Diagnosis Date    Atrial fibrillation (HCC)     Benign essential hypertension     H/O nausea and vomiting     H/O vitamin D deficiency     Hyperlipidemia     Hypertension     Mixed hyperlipidemia        History reviewed  No pertinent surgical history  Family History   Problem Relation Age of Onset    Arthritis Mother     Hypertension Father     Prostate cancer Father     Cancer Family     Diabetes Family     Heart disease Family     Other Family      hyperlipoprotein     I have reviewed and agree with the history as documented  Social History   Substance Use Topics    Smoking status: Never Smoker    Smokeless tobacco: Never Used    Alcohol use Yes      Comment: Occ        Review of Systems   Constitutional: Negative for chills and fever  Respiratory: Negative for cough, choking, shortness of breath and wheezing  Cardiovascular: Positive for palpitations  Negative for chest pain and leg swelling  All other systems reviewed and are negative        Physical Exam  ED Triage Vitals   Temperature Pulse Respirations Blood Pressure SpO2   02/15/18 0330 02/15/18 0321 02/15/18 0321 02/15/18 0330 02/15/18 0321   (!) 97 4 °F (36 3 °C) (!) 126 16 (!) 170/117 96 %      Temp Source Heart Rate Source Patient Position - Orthostatic VS BP Location FiO2 (%)   02/15/18 0330 02/15/18 0321 02/15/18 0330 02/15/18 0330 --   Tympanic Monitor Sitting Left arm       Pain Score       02/15/18 0321       No Pain           Orthostatic Vital Signs  Vitals:    02/15/18 0815 02/15/18 1030 02/15/18 1045 02/15/18 1100   BP: 110/67 150/71 150/87 146/83   Pulse: (!) 50 (!) 54 (!) 52 (!) 46   Patient Position - Orthostatic VS:           Physical Exam   Constitutional: He is oriented to person, place, and time  He appears well-developed and well-nourished  HENT:   Head: Normocephalic and atraumatic  Eyes: EOM are normal  Pupils are equal, round, and reactive to light  Cardiovascular: S1 normal, S2 normal and normal heart sounds  An irregularly irregular rhythm present  Tachycardia present  Pulmonary/Chest: Effort normal and breath sounds normal    Abdominal: Soft  Bowel sounds are normal  He exhibits no distension and no mass  There is no tenderness  There is no rebound and no guarding  Neurological: He is alert and oriented to person, place, and time  Skin: Skin is warm and dry  Psychiatric: He has a normal mood and affect  His behavior is normal  Judgment and thought content normal    Nursing note and vitals reviewed        ED Medications  Medications   sodium chloride 0 9 % bolus 1,000 mL (0 mL Intravenous Stopped 2/15/18 0541)   enoxaparin (LOVENOX) subcutaneous injection 120 mg (120 mg Subcutaneous Given 2/15/18 0505)   potassium chloride (K-DUR,KLOR-CON) CR tablet 40 mEq (40 mEq Oral Given 2/15/18 1104)       Diagnostic Studies  Results Reviewed     Procedure Component Value Units Date/Time    Troponin I [[id number]]  (Normal) Collected:  02/15/18 0634    Lab Status:  Final result Specimen:  Blood from Arm, Left Updated:  02/15/18 0704     Troponin I <0 02 ng/mL     Narrative:         [employer] analyzer 99% cutoff is > 0 04 ng/mL in network labs    o cTnI 99% cutoff is useful only when applied to patients in the clinical setting of myocardial ischemia  o cTnI 99% cutoff should be interpreted in the context of clinical history, ECG findings and possibly cardiac imaging to establish correct diagnosis  o cTnI 99% cutoff may be suggestive but clearly not indicative of a coronary event without the clinical setting of myocardial ischemia  TSH [[id number]]  (Abnormal) Collected:  02/15/18 0331    Lab Status:  Final result Specimen:  Blood from Arm, Left Updated:  02/15/18 0400     TSH 3RD GENERATON 3 804 (H) uIU/mL     Narrative:         Patients undergoing fluorescein dye angiography may retain small amounts of fluorescein in the body for 48-72 hours post procedure  Samples containing fluorescein can produce falsely depressed TSH values  If the patient had this procedure,a specimen should be resubmitted post fluorescein clearance  Magnesium [[id number]]  (Normal) Collected:  02/15/18 0331    Lab Status:  Final result Specimen:  Blood from Arm, Left Updated:  02/15/18 0400     Magnesium 2 2 mg/dL     Troponin I [[id number]]  (Normal) Collected:  02/15/18 0331    Lab Status:  Final result Specimen:  Blood from Arm, Left Updated:  02/15/18 0359     Troponin I <0 02 ng/mL     Narrative:         [employer] analyzer 99% cutoff is > 0 04 ng/mL in network labs    o cTnI 99% cutoff is useful only when applied to patients in the clinical setting of myocardial ischemia  o cTnI 99% cutoff should be interpreted in the context of clinical history, ECG findings and possibly cardiac imaging to establish correct diagnosis  o cTnI 99% cutoff may be suggestive but clearly not indicative of a coronary event without the clinical setting of myocardial ischemia      Rapid drug screen, urine [[id number]]  (Normal) Collected:  02/15/18 0338    Lab Status:  Final result Specimen:  Urine from Urine, Clean Catch Updated:  02/15/18 7176     Amph/Meth UR Negative     Barbiturate Ur Negative     Benzodiazepine Urine Negative     Cocaine Urine Negative     Methadone Urine Negative     Opiate Urine Negative     PCP Ur Negative     THC Urine Negative    Narrative:         FOR MEDICAL PURPOSES ONLY  IF CONFIRMATION NEEDED PLEASE CONTACT THE LAB WITHIN 5 DAYS  Drug Screen Cutoff Levels:  AMPHETAMINE/METHAMPHETAMINES  1000 ng/mL  BARBITURATES     200 ng/mL  BENZODIAZEPINES     200 ng/mL  COCAINE      300 ng/mL  METHADONE      300 ng/mL  OPIATES      300 ng/mL  PHENCYCLIDINE     25 ng/mL  THC       50 ng/mL    Comprehensive metabolic panel [[id number]]  (Abnormal) Collected:  02/15/18 0331    Lab Status:  Final result Specimen:  Blood from Arm, Left Updated:  02/15/18 0352     Sodium 144 mmol/L      Potassium 3 4 (L) mmol/L      Chloride 107 mmol/L      CO2 29 mmol/L      Anion Gap 8 mmol/L      BUN 21 mg/dL      Creatinine 0 99 mg/dL      Glucose 104 mg/dL      Calcium 10 0 mg/dL      AST 27 U/L      ALT 46 U/L      Alkaline Phosphatase 67 U/L      Total Protein 7 6 g/dL      Albumin 4 6 g/dL      Total Bilirubin 0 90 mg/dL      eGFR 94 ml/min/1 73sq m     Narrative:         National Kidney Disease Education Program recommendations are as follows:  GFR calculation is accurate only with a steady state creatinine  Chronic Kidney disease less than 60 ml/min/1 73 sq  meters  Kidney failure less than 15 ml/min/1 73 sq  meters      CBC and differential [[id number]]  (Abnormal) Collected:  02/15/18 0331    Lab Status:  Final result Specimen:  Blood from Arm, Left Updated:  02/15/18 0351     WBC 6 40 Thousand/uL      RBC 5 18 Million/uL      Hemoglobin 16 0 g/dL      Hematocrit 48 2 %      MCV 93 fL      MCH 31 0 pg      MCHC 33 3 g/dL      RDW 13 3 %      MPV 9 0 fL      Platelets 055 Thousands/uL      nRBC 0 /100 WBCs      Neutrophils Relative 34 (L) %      Lymphocytes Relative 57 (H) %      Monocytes Relative 7 %      Eosinophils Relative 2 % Basophils Relative 1 %      Neutrophils Absolute 2 20 Thousands/µL      Lymphocytes Absolute 3 60 Thousands/µL      Monocytes Absolute 0 50 Thousand/µL      Eosinophils Absolute 0 10 Thousand/µL      Basophils Absolute 0 00 Thousands/µL     Protime-INR [[id number]]  (Normal) Collected:  02/15/18 0331    Lab Status:  Final result Specimen:  Blood from Arm, Left Updated:  02/15/18 0350     Protime 10 9 seconds      INR 1 04    APTT [[id number]]  (Normal) Collected:  02/15/18 0331    Lab Status:  Final result Specimen:  Blood from Arm, Left Updated:  02/15/18 0350     PTT 25 seconds     Narrative: Therapeutic Heparin Range = 60-90 seconds                 X-ray chest 1 view portable   Final Result by Dariel Goodwin MD (02/15 4291)      No active pulmonary disease        Workstation performed: [id number]                    Procedures  ECG 12 Lead Documentation  Date/Time: 2/15/2018 3:39 AM  Performed by: Kamilla Garcia by: Michel Taveras     Indications / Diagnosis:  Palpitations  ECG reviewed by me, the ED Provider: yes    Patient location:  ED  Previous ECG:     Previous ECG:  Unavailable    Comparison to cardiac monitor: Yes    Interpretation:     Interpretation: abnormal    Rate:     ECG rate:  118bpm    ECG rate assessment: tachycardic    Rhythm:     Rhythm: atrial fibrillation    ECG 12 Lead Documentation  Date/Time: 2/15/2018 3:40 AM  Performed by: Kamilla Garcia by: Michel Taveras     Indications / Diagnosis:  Palpitations  ECG reviewed by me, the ED Provider: yes    Patient location:  ED  Previous ECG:     Previous ECG:  Compared to current    Similarity:  Changes noted    Comparison to cardiac monitor: Yes    Interpretation:     Interpretation: normal    Rate:     ECG rate:  60bm    ECG rate assessment: normal    Rhythm:     Rhythm: sinus rhythm             Phone Contacts  ED Phone Contact    ED Course  ED Course                                [facility]  Number of Diagnoses or Management Options  Diagnosis management comments: Pt is stable at this time  D/w Dr Brendan Coats (cardio)  He states that pt will be seen in the ER by cardiology, recommends 1 dose of lovenox and a repeat trop  Pt informed and agrees with plan    CritCare Time    Disposition  Final diagnoses:   Atrial fibrillation ([address] )     Time reflects when diagnosis was documented in both MDM as applicable and the Disposition within this note     Time User Action Codes Description Comment    2/15/2018  8:36 AM Mahogany Diaz Add [I48 91] Atrial fibrillation [facility])       ED Disposition     ED Disposition Condition Comment    Discharge  Isabelle Stringer discharge to home/self care      Condition at discharge: Stable        Follow-up Information     Follow up With Specialties Details Why Contact Info Additional [facility] DO [facility] Schedule an appointment as soon as possible for a visit  [address] [place] [address] E       395 [facility] Emergency Department Emergency Medicine  If symptoms worsen 49 [facility]  [phone] [facility], [facility] [place], [address]    Lucila Carter MD Cardiology Schedule an appointment as soon as possible for a visit  Kassi Lancaster  [phone]           Discharge Medication List as of 2/15/2018  1:04 PM      START taking these medications    Details   aspirin 81 mg chewable tablet Chew 4 tablets (324 mg total) daily, Starting u 2/15/2018, Print      flecainide (TAMBOCOR) 50 mg tablet Take 1 tablet (50 mg total) by mouth 2 (two) times a day for 14 days, Starting u 2/15/2018, Until u 3/1/2018, Print         CONTINUE these medications which have NOT CHANGED    Details   atorvastatin (LIPITOR) 20 mg tablet Take by mouth, Starting Fri 9/22/2017, Historical Med      Magnesium 500 MG CAPS Take by mouth, Starting Fri 9/22/2017, Historical Med      Multiple Vitamin (MULTI VITAMIN DAILY PO) Take 1 tablet by mouth daily, Starting Fri 9/22/2017, Historical Med      nebivolol (BYSTOLIC) 10 mg tablet Take by mouth daily, Starting Fri 9/22/2017, Historical Med           No discharge procedures on file      ED Provider  Electronically Signed by           Art Huang DO  02/20/18 5941

## 2018-02-15 NOTE — CONSULTS
ER CARDIOLOGY CONSULTATION  Collette Shutters 43 y o  male MRN: [id number]  Unit/Bed#: ED 07 Encounter: [id number]      History of Present Illness   Physician Requesting Consult: Moni Diaz DO  Reason for Consult / Principal Problem: palpitations    Assessment/Plan   Atrial fibrillation with rvr- symptomatic  Reports prior episode  [id number]  Normal TSh  Denies alcohol  No clear hx of sleep apnea  Idiopathic? RVR with usage of bystolic 37JM daily  Symptomatic- will try anti-arrhythmic  Will add on flecainide 50 mg twice a day with bystolic  Normal QRS/Qtc  Plan to rule out ischemia prior to starting  Exercise treadmill prior to discharge  Follow-up with me in one week for consideration of event monitor + possible consideration for ablation long term    Chest pain- secondary to palpitations  Plan for exercise treadmill prior to discharge  Intermediate risk  Htn- controlled    Hld- atorvastatin        HPI: Collette Shutters is a 43y o  year old male who presents with history of hypertension, hyperlipidemia, family history of myocardial infarction presents with severe palpitations and chest discomfort awaking him from sleep at 3:00 a m  He reports previously feeling some brief flutters in his chest months ago but never sustained  Most of the times flutters would go away with a deep breath  This a m  he awoke with severe palpitations which did not resolve until arriving to the ED  He denies having any syncope  He denies having any edema  He denies any recent fevers or chills  He denies any recent nausea vomiting or GI illnesses  He denies any recent travels  He denies significant alcohol usage  He reports having some weight loss in the last couple of months  He denies significant snoring or daytime sleepiness  Since arriving to the ER he has had no palpitations or any chest discomfort  He reports being very active running and playing flag football without having any chest tightness        Historical Information   Past Medical History:   Diagnosis Date    Hyperlipidemia     Hypertension      History reviewed  No pertinent surgical history  History   Alcohol Use    Yes     Comment: Occ     History   Drug Use No     History   Smoking Status    Never Smoker   Smokeless Tobacco    Never Used     History reviewed  No pertinent family history  Meds/Allergies   Prior to Admission medications    Medication Sig Start Date End Date Taking? Authorizing Provider   atorvastatin (LIPITOR) 20 mg tablet Take by mouth 9/22/17  Yes Historical Provider, MD   Magnesium 500 MG CAPS Take by mouth 9/22/17  Yes Historical Provider, MD   Multiple Vitamin (MULTI VITAMIN DAILY PO) Take 1 tablet by mouth daily 9/22/17  Yes Historical Provider, MD   nebivolol (BYSTOLIC) 10 mg tablet Take by mouth daily 9/22/17  Yes Historical Provider, MD     Current Facility-Administered Medications   Medication Dose Route Frequency Provider Last Rate Last Dose    flecainide (TAMBOCOR) tablet 50 mg  50 mg Oral Q12H Trino Rod MD         Current Outpatient Prescriptions   Medication Sig Dispense Refill    atorvastatin (LIPITOR) 20 mg tablet Take by mouth      Magnesium 500 MG CAPS Take by mouth      Multiple Vitamin (MULTI VITAMIN DAILY PO) Take 1 tablet by mouth daily      nebivolol (BYSTOLIC) 10 mg tablet Take by mouth daily       No Known Allergies    Review of systems  CONSTITUTIONAL:  No weight loss, fever, chills, weakness or fatigue  HEENT:  Eyes:  No visual loss, blurred vision, double vision or yellow sclerae  Ears, Nose, Throat:  No hearing loss, sneezing, congestion, runny nose or sore throat  SKIN:  No rash or itching  CARDIOVASCULAR:  As per HPI  RESPIRATORY:  No shortness of breath, cough or sputum  GASTROINTESTINAL:  No anorexia, nausea, vomiting or diarrhea  No abdominal pain or blood  GENITOURINARY:  Burning on urination  No flank pain    NEUROLOGICAL:  No headache, dizziness, syncope, paralysis, ataxia, numbness or tingling in the extremities  No change in bowel or bladder control  MUSCULOSKELETAL:  No muscle, back pain, joint pain or stiffness  HEMATOLOGIC:  No anemia, bleeding or bruising  LYMPHATICS:  No enlarged nodes  No history of splenectomy  PSYCHIATRIC:  No active suicidal or homicidal ideation  ENDOCRINOLOGIC:  No reports of sweating, cold or heat intolerance  No polyuria or polydipsia  ALLERGIES:  No history of asthma, hives, eczema or rhinitis  More than 10 systems reviewed and otherwise noncontributory  Objective   Vitals: Blood pressure 110/67, pulse (!) 50, temperature (!) 97 4 °F (36 3 °C), temperature source Tympanic, resp  rate 15, weight 124 kg (274 lb), SpO2 94 %  Physical Exam   Constitutional: He is oriented to person, place, and time  He appears well-developed and well-nourished  No distress  HENT:   Head: Normocephalic and atraumatic  Right Ear: External ear normal    Left Ear: External ear normal    Nose: Nose normal    Mouth/Throat: No oropharyngeal exudate  Eyes: Conjunctivae are normal  Pupils are equal, round, and reactive to light  Right eye exhibits no discharge  Left eye exhibits no discharge  No scleral icterus  Neck: Normal range of motion  No JVD present  No tracheal deviation present  No thyromegaly present  Cardiovascular: Normal rate, regular rhythm and intact distal pulses  Exam reveals no gallop and no friction rub  No murmur heard  Pulmonary/Chest: Effort normal and breath sounds normal  No stridor  No respiratory distress  He has no wheezes  He has no rales  He exhibits no tenderness  Abdominal: Soft  Bowel sounds are normal  He exhibits no distension and no mass  There is no tenderness  There is no rebound and no guarding  Genitourinary:   Genitourinary Comments: No CVA tenderness   Musculoskeletal: He exhibits deformity  He exhibits no edema or tenderness  Neurological: He is alert and oriented to person, place, and time  He displays normal reflexes  He exhibits normal muscle tone  Skin: Skin is warm and dry  No rash noted  He is not diaphoretic  No erythema  Psychiatric: He has a normal mood and affect  His behavior is normal  Judgment and thought content normal    Nursing note and vitals reviewed        Recent Results (from the past 24 hour(s))   Comprehensive metabolic panel    Collection Time: 02/15/18  3:31 AM   Result Value Ref Range    Sodium 144 136 - 145 mmol/L    Potassium 3 4 (L) 3 5 - 5 3 mmol/L    Chloride 107 100 - 108 mmol/L    CO2 29 21 - 32 mmol/L    Anion Gap 8 4 - 13 mmol/L    BUN 21 5 - 25 mg/dL    Creatinine 0 99 0 60 - 1 30 mg/dL    Glucose 104 65 - 140 mg/dL    Calcium 10 0 8 3 - 10 1 mg/dL    AST 27 5 - 45 U/L    ALT 46 12 - 78 U/L    Alkaline Phosphatase 67 46 - 116 U/L    Total Protein 7 6 6 4 - 8 2 g/dL    Albumin 4 6 3 5 - 5 0 g/dL    Total Bilirubin 0 90 0 20 - 1 00 mg/dL    eGFR 94 ml/min/1 73sq m   CBC and differential    Collection Time: 02/15/18  3:31 AM   Result Value Ref Range    WBC 6 40 4 80 - 10 80 Thousand/uL    RBC 5 18 4 70 - 6 10 Million/uL    Hemoglobin 16 0 14 0 - 18 0 g/dL    Hematocrit 48 2 42 0 - 52 0 %    MCV 93 82 - 98 fL    MCH 31 0 27 0 - 31 0 pg    MCHC 33 3 31 4 - 37 4 g/dL    RDW 13 3 11 6 - 15 1 %    MPV 9 0 8 9 - 12 7 fL    Platelets 642 594 - 031 Thousands/uL    nRBC 0 /100 WBCs    Neutrophils Relative 34 (L) 43 - 75 %    Lymphocytes Relative 57 (H) 14 - 44 %    Monocytes Relative 7 4 - 12 %    Eosinophils Relative 2 0 - 6 %    Basophils Relative 1 0 - 1 %    Neutrophils Absolute 2 20 1 85 - 7 62 Thousands/µL    Lymphocytes Absolute 3 60 0 60 - 4 47 Thousands/µL    Monocytes Absolute 0 50 0 17 - 1 22 Thousand/µL    Eosinophils Absolute 0 10 0 00 - 0 61 Thousand/µL    Basophils Absolute 0 00 0 00 - 0 10 Thousands/µL   Protime-INR    Collection Time: 02/15/18  3:31 AM   Result Value Ref Range    Protime 10 9 9 4 - 11 7 seconds    INR 1 04 0 86 - 1 16   APTT    Collection Time: 02/15/18  3:31 AM   Result Value Ref Range    PTT 25 23 - 35 seconds   Troponin I    Collection Time: 02/15/18  3:31 AM   Result Value Ref Range    Troponin I <0 02 <=0 04 ng/mL   TSH    Collection Time: 02/15/18  3:31 AM   Result Value Ref Range    TSH 3RD GENERATON 3 804 (H) 0 358 - 3 740 uIU/mL   Magnesium    Collection Time: 02/15/18  3:31 AM   Result Value Ref Range    Magnesium 2 2 1 6 - 2 6 mg/dL   Smear Review(Phlebs Do Not Order)    Collection Time: 02/15/18  3:31 AM   Result Value Ref Range    Platelet Estimate Adequate Adequate   Rapid drug screen, urine    Collection Time: 02/15/18  3:38 AM   Result Value Ref Range    Amph/Meth UR Negative Negative    Barbiturate Ur Negative Negative    Benzodiazepine Urine Negative Negative    Cocaine Urine Negative Negative    Methadone Urine Negative Negative    Opiate Urine Negative Negative    PCP Ur Negative Negative    THC Urine Negative Negative   Troponin I    Collection Time: 02/15/18  6:34 AM   Result Value Ref Range    Troponin I <0 02 <=0 04 ng/mL     Imaging: I have personally reviewed pertinent films in PACS    Cardiac testing:   Results for orders placed during the hospital encounter of 02/15/18   Echo complete with contrast if indicated    Narrative An 39  [facility] [facility] [phone]    Transthoracic Echocardiogram  2D, M-mode, Doppler, and Color Doppler    Study date:  15-Feb-2018    Patient: Isacc Knox  MR number: [id number]  Account number: [de-identified]  : 1975  Age: 43 years  Gender: Male  Status: Routine  Location: Emergency room  Height: 75 in  Weight: 273 5 lb  BP: 63000/ 73 mmHg    Indications: A Fib , Abnormal heart sound    Diagnoses: I48 0 - Atrial fibrillation    Sonographer:  TIFFANIE Gaffney  Primary Physician:  Yael Rogers Saas:  [facility]  Interpreting Physician:  Irasema Tinajero MD    SUMMARY    LEFT VENTRICLE:  Systolic function was normal by Teichholz   Ejection fraction was estimated in the range of 50 % to 55 % to be 52 %  There were no regional wall motion abnormalities  Wall thickness was at the upper limits of normal     LEFT ATRIUM:  The atrium was mildly to moderately dilated  RIGHT ATRIUM:  The atrium was mildly dilated  HISTORY: PRIOR HISTORY: HTN, Hyperlipidemia    PROCEDURE: The procedure was performed in the emergency room  This was a routine study  The transthoracic approach was used  The study included complete 2D imaging, M-mode, complete spectral Doppler, and color Doppler  The heart rate was  51 bpm, at the start of the study  Images were obtained from the parasternal, apical, subcostal, and suprasternal notch acoustic windows  Image quality was adequate  LEFT VENTRICLE: Size was normal  Systolic function was normal by Teichholz  Ejection fraction was estimated in the range of 50 % to 55 % to be 52 %  There were no regional wall motion abnormalities  Wall thickness was at the upper limits  of normal  No evidence of apical thrombus  DOPPLER: Left ventricular diastolic function parameters were normal     RIGHT VENTRICLE: The size was normal  Systolic function was normal  Wall thickness was normal     LEFT ATRIUM: The atrium was mildly to moderately dilated  RIGHT ATRIUM: The atrium was mildly dilated  MITRAL VALVE: Valve structure was normal  There was normal leaflet separation  DOPPLER: The transmitral velocity was within the normal range  There was no evidence for stenosis  There was no significant regurgitation  AORTIC VALVE: The valve was trileaflet  Leaflets exhibited mildly increased thickness and normal cuspal separation  DOPPLER: Transaortic velocity was within the normal range  There was no evidence for stenosis  There was no significant  regurgitation  TRICUSPID VALVE: The valve structure was normal  There was normal leaflet separation  DOPPLER: The transtricuspid velocity was within the normal range   There was no evidence for stenosis  There was no significant regurgitation  PULMONIC VALVE: Leaflets exhibited normal thickness, no calcification, and normal cuspal separation  DOPPLER: The transpulmonic velocity was within the normal range  There was no significant regurgitation  PERICARDIUM: There was no pericardial effusion  The pericardium was normal in appearance  AORTA: The root exhibited normal size  SYSTEMIC VEINS: IVC: The inferior vena cava was normal in size  SYSTEM MEASUREMENT TABLES    2D mode  AoR Diam 2D: 3 4 cm  LA Diam (2D): 4 5 cm  LA/Ao (2D): 1 32  FS (2D Teich): 27 4 %  IVSd (2D): 0 97 cm  LVDEV: 146 cm³  LVEDV MOD BP: 180 cm³  LVESV: 68 8 cm³  LVIDd(2D): 5 47 cm  LVISd (2D): 3 97 cm  LVOT Area 2D: 3 14 cm squared  LVPWd (2D): 1 11 cm  SV (Teich): 77 2 cm³    Apical four chamber  LVEF A4C: 54 %    Apical two chamber  LA Area: 26 4 cm squared  LA Volume: 99 cm³  LVEF A2C: 52 %    Unspecified Scan Mode  NATALIE Cont Eq (Peak Bonifacio): 2 61 cm squared  LVOT Diam : 2 cm  LVOT Vmax: 1170 mm/s  LVOT Vmax; Mean: 1170 mm/s  Peak Grad ; Mean: 5 mm[Hg]  MV Peak A Bonifacio: 578 mm/s  MV Peak E Bonifacio  Mean: 884 mm/s  MVA (PHT): 4 31 cm squared  PHT: 51 ms  RA Area: 19 6 cm squared  RA Volume: 57 4 cm³  TAPSE: 2 3 cm    [facility]    Prepared and electronically signed by    Ivonne Kilgore MD  Signed 15-Feb-2018 10:12:16         EKG: atrial fibrillation with rvr nonspecific st changes qtc 490             Normal sinus rhythm no acute st/t wave changes qtc 450    Counseling / Coordination of Care  Total time spent today 60 minutes  Greater than 50% of total time was spent with the patient and / or family counseling and / or coordination of care  afib with rvr  Ivonne Kilgore MD [facility]      "This note has been constructed using a voice recognition system  Therefore there may be syntax, spelling, and/or grammatical errors   Please call if you have any questions  "

## 2018-02-16 NOTE — ED CARE HANDOFF
Emergency Department Sign Out Note        Sign out and transfer of care from Dr Micki Arteaga  See Separate Emergency Department note  I reviewed patient's EKG labs and chest x-ray  Delayne Cabot arrived in the ED and evaluated the patient  Patient got a transthoracic echocardiogram   He also got a stress test   Both of the tests were reviewed by Dr Thomas Zhou  He recommended discharging the patient with flecainide and aspirin and follow-up in his clinic in 1 week  Patient verbalized understanding of instructions had no further questions at the time of discharge  ED Course      Procedures  MDM  CritCare Time      Disposition  Final diagnoses:   Atrial fibrillation ([address] )     Time reflects when diagnosis was documented in both MDM as applicable and the Disposition within this note     Time User Action Codes Description Comment    2/15/2018  8:36 AM Kandy Diaz Add [I48 91] Atrial fibrillation [facility])       ED Disposition     ED Disposition Condition Comment    Discharge  Dorie Spenser discharge to home/self care      Condition at discharge: Stable        Follow-up Information     Follow up With Specialties Details Why Contact Info Additional [facility] DO [facility] Schedule an appointment as soon as possible for a visit  [address] [place] [address] E       [address] [facility] Emergency Department Emergency Medicine  If symptoms worsen [facility]  [phone] [facility], [place], [place], [address]    Leopoldo Boas, MD Cardiology Schedule an appointment as soon as possible for a visit  Kassi [facility]  [phone]           Discharge Medication List as of 2/15/2018  1:04 PM      START taking these medications    Details   aspirin 81 mg chewable tablet Chew 4 tablets (324 mg total) daily, Starting Thu 2/15/2018, Print      flecainide (TAMBOCOR) 50 mg tablet Take 1 tablet (50 mg total) by mouth 2 (two) times a day for 14 days, Starting Thu 2/15/2018, Until Thu 3/1/2018, Print         CONTINUE these medications which have NOT CHANGED    Details   atorvastatin (LIPITOR) 20 mg tablet Take by mouth, Starting Fri 9/22/2017, Historical Med      Magnesium 500 MG CAPS Take by mouth, Starting Fri 9/22/2017, Historical Med      Multiple Vitamin (MULTI VITAMIN DAILY PO) Take 1 tablet by mouth daily, Starting Fri 9/22/2017, Historical Med      nebivolol (BYSTOLIC) 10 mg tablet Take by mouth daily, Starting Fri 9/22/2017, Historical Med           No discharge procedures on file         ED Provider  Electronically Signed by     Sirisha Kilgore DO  02/16/18 [id number]

## 2018-02-17 LAB
ATRIAL RATE: 107 BPM
ATRIAL RATE: 60 BPM
P AXIS: 23 DEGREES
PR INTERVAL: 188 MS
QRS AXIS: -5 DEGREES
QRS AXIS: 0 DEGREES
QRSD INTERVAL: 100 MS
QRSD INTERVAL: 98 MS
QT INTERVAL: 352 MS
QT INTERVAL: 450 MS
QTC INTERVAL: 450 MS
QTC INTERVAL: 493 MS
T WAVE AXIS: -28 DEGREES
T WAVE AXIS: 25 DEGREES
VENTRICULAR RATE: 118 BPM
VENTRICULAR RATE: 60 BPM

## 2018-02-17 PROCEDURE — 93010 ELECTROCARDIOGRAM REPORT: CPT | Performed by: INTERNAL MEDICINE

## 2018-02-19 ENCOUNTER — OFFICE VISIT (OUTPATIENT)
Dept: FAMILY MEDICINE CLINIC | Facility: CLINIC | Age: 43
End: 2018-02-19
Payer: COMMERCIAL

## 2018-02-19 VITALS
TEMPERATURE: 98.1 F | WEIGHT: 286 LBS | SYSTOLIC BLOOD PRESSURE: 138 MMHG | BODY MASS INDEX: 35.56 KG/M2 | RESPIRATION RATE: 20 BRPM | HEIGHT: 75 IN | HEART RATE: 60 BPM | DIASTOLIC BLOOD PRESSURE: 84 MMHG

## 2018-02-19 DIAGNOSIS — J06.9 ACUTE UPPER RESPIRATORY INFECTION: Primary | ICD-10-CM

## 2018-02-19 PROBLEM — R00.2 PALPITATIONS: Status: ACTIVE | Noted: 2017-09-22

## 2018-02-19 PROBLEM — I48.91 ATRIAL FIBRILLATION (HCC): Status: ACTIVE | Noted: 2018-02-19

## 2018-02-19 PROCEDURE — 3725F SCREEN DEPRESSION PERFORMED: CPT | Performed by: NURSE PRACTITIONER

## 2018-02-19 PROCEDURE — 99213 OFFICE O/P EST LOW 20 MIN: CPT | Performed by: NURSE PRACTITIONER

## 2018-02-19 RX ORDER — AMOXICILLIN 875 MG/1
875 TABLET, COATED ORAL 2 TIMES DAILY
Qty: 20 TABLET | Refills: 0 | Status: SHIPPED | OUTPATIENT
Start: 2018-02-19 | End: 2018-03-01

## 2018-02-19 NOTE — PATIENT INSTRUCTIONS
Drinking plenty of fluids, saline nasal rinses or nasal spray, and steam or cool air humidification are all effective ways of managing symptoms  May take Mucinex D for sinus congestion, or Coricidin HBP if you have a heart condition or high blood pressure  Mucinex or Robitussin DM are used to control cough symptoms and include an expectorant  May take Ibuprofen or Tylenol as needed for pain or fevers  Recommend recheck if symptoms do not resolve or improve within 1 week

## 2018-02-19 NOTE — PROGRESS NOTES
Assessment/Plan:    No problem-specific Assessment & Plan notes found for this encounter  Diagnoses and all orders for this visit:    Acute upper respiratory infection  -     amoxicillin (AMOXIL) 875 mg tablet; Take 1 tablet (875 mg total) by mouth 2 (two) times a day for 10 days          There are no Patient Instructions on file for this visit  No Follow-up on file  Subjective:      Patient ID: Yuliya Moise is a 43 y o  male  Chief Complaint   Patient presents with    Cough     Started one week ago       He has had cold symptoms for over a week  He has a worsening cough and with increased mucous  Cough is now painful  Denies fevers, wheezing, or SOB  Not taking anything OTC for symptoms  Son is sick as well  Was in the ER last week for atrial fibrillation, seeing Dr Kenan Sheridan later this week  The following portions of the patient's history were reviewed and updated as appropriate: allergies, current medications, past family history, past medical history, past social history, past surgical history and problem list     Review of Systems   Constitutional: Negative for chills, fatigue and fever  HENT: Positive for congestion  Negative for ear pain, postnasal drip, rhinorrhea, sinus pressure and sore throat  Respiratory: Positive for cough  Negative for shortness of breath and wheezing  Cardiovascular: Negative for chest pain  Gastrointestinal: Negative for abdominal pain, diarrhea, nausea and vomiting  Musculoskeletal: Negative for arthralgias  Skin: Negative for rash  Neurological: Positive for headaches           Current Outpatient Prescriptions   Medication Sig Dispense Refill    aspirin 81 mg chewable tablet Chew 4 tablets (324 mg total) daily 20 tablet 0    atorvastatin (LIPITOR) 20 mg tablet Take by mouth daily        flecainide (TAMBOCOR) 50 mg tablet Take 1 tablet (50 mg total) by mouth 2 (two) times a day for 14 days 28 tablet 0    Magnesium 500 MG CAPS Take by mouth      Multiple Vitamin (MULTI VITAMIN DAILY PO) Take 1 tablet by mouth daily      nebivolol (BYSTOLIC) 10 mg tablet Take by mouth daily      amoxicillin (AMOXIL) 875 mg tablet Take 1 tablet (875 mg total) by mouth 2 (two) times a day for 10 days 20 tablet 0     No current facility-administered medications for this visit  Objective:    /84   Pulse 60   Temp 98 1 °F (36 7 °C)   Resp 20   Ht 6' 3" (1 905 m)   Wt 130 kg (286 lb)   BMI 35 75 kg/m²        Physical Exam   Constitutional: He appears well-developed and well-nourished  HENT:   Right Ear: External ear and ear canal normal  Tympanic membrane is bulging  Left Ear: External ear and ear canal normal  Tympanic membrane is bulging  Nose: Mucosal edema present  Mouth/Throat: Oropharynx is clear and moist and mucous membranes are normal    Eyes: Conjunctivae are normal    Cardiovascular: Normal rate, regular rhythm and normal heart sounds  Pulmonary/Chest: Effort normal and breath sounds normal    Abdominal: Bowel sounds are normal  He exhibits no distension  There is no splenomegaly or hepatomegaly  There is no tenderness  Lymphadenopathy:        Right cervical: No superficial cervical adenopathy present  Left cervical: No superficial cervical adenopathy present  Skin: No rash noted  Psychiatric: He has a normal mood and affect                Gwendolyn Burton

## 2018-02-22 ENCOUNTER — OFFICE VISIT (OUTPATIENT)
Dept: CARDIOLOGY CLINIC | Facility: CLINIC | Age: 43
End: 2018-02-22
Payer: COMMERCIAL

## 2018-02-22 VITALS
HEART RATE: 47 BPM | WEIGHT: 289 LBS | BODY MASS INDEX: 35.93 KG/M2 | OXYGEN SATURATION: 98 % | HEIGHT: 75 IN | DIASTOLIC BLOOD PRESSURE: 70 MMHG | SYSTOLIC BLOOD PRESSURE: 120 MMHG

## 2018-02-22 DIAGNOSIS — I48.91 ATRIAL FIBRILLATION, UNSPECIFIED TYPE (HCC): Primary | ICD-10-CM

## 2018-02-22 DIAGNOSIS — K76.0 FATTY LIVER DISEASE, NONALCOHOLIC: ICD-10-CM

## 2018-02-22 DIAGNOSIS — R00.2 PALPITATIONS: ICD-10-CM

## 2018-02-22 DIAGNOSIS — G47.20 ABNORMAL CIRCADIAN RHYTHM: ICD-10-CM

## 2018-02-22 PROCEDURE — 93000 ELECTROCARDIOGRAM COMPLETE: CPT | Performed by: INTERNAL MEDICINE

## 2018-02-22 PROCEDURE — 99215 OFFICE O/P EST HI 40 MIN: CPT | Performed by: INTERNAL MEDICINE

## 2018-02-22 RX ORDER — NEBIVOLOL 20 MG/1
20 TABLET ORAL DAILY
Qty: 90 TABLET | Refills: 3
Start: 2018-02-22 | End: 2018-03-22

## 2018-02-22 NOTE — PROGRESS NOTES
Cardiology Follow Up  Betzaida Began  1975  [id number]  [address] [facility]  [address] [facility] [address]    1  Atrial fibrillation, unspecified type ([address] )  POCT ECG    nebivolol (BYSTOLIC) 20 MG tablet    Home Study   2  Palpitations  Home Study   3  Fatty liver disease, nonalcoholic     4  Abnormal circadian rhythm  Home Study      Discussion/Plan:  Atrial fibrillation-unclear etiology  Denies significant alcohol use  He denies significan family history  Normal EF  Thyroid normal  Cannot rule out sleep apnea  Abnormal circadian rhythm  Plan for sleep screen  Continue Bystolic 20 mg daily plus flecainide 50 mg twice a day  Normal QTC  Negative exercise treadmill for evidence of ischemia  Non alcoholic steatohepatitis-improved with weight loss    Interval History:  40-year-old gentleman with no prior cardiac history, benign hypertension with recent ER visit for AFib with RVR  Spontaneously converted with rate control agent  He reports having prior episodes  Of palpitations  He was started on anti rhythmic therapy  Since being started on antiarrhythmic therapy he has not had any further recurrences  He denies having dizziness or lightheadedness  He denies having chest discomfort  He denies having significant lower extremity edema  He denies having bleeding or bruising  He denies having any falls      Patient Active Problem List   Diagnosis    Benign essential hypertension    Fatty liver disease, nonalcoholic    Mixed hyperlipidemia    Obesity    Organic impotence    Palpitations    Atrial fibrillation (HCC)     Past Medical History:   Diagnosis Date    Atrial fibrillation ([address] )     Benign essential hypertension     H/O nausea and vomiting     H/O vitamin D deficiency     Hyperlipidemia     Hypertension     Mixed hyperlipidemia      Social History     Social History    Marital status: Single     Spouse name: N/A    Number of children: N/A    Years of education: N/A     Occupational History    Not on file  Social History Main Topics    Smoking status: Never Smoker    Smokeless tobacco: Never Used    Alcohol use Yes      Comment: Occ    Drug use: No    Sexual activity: Not on file     Other Topics Concern    Not on file     Social History Narrative    No narrative on file      Family History   Problem Relation Age of Onset    Arthritis Mother     Hypertension Father     Prostate cancer Father     Cancer Family     Diabetes Family     Heart disease Family     Other Family      hyperlipoprotein     History reviewed  No pertinent surgical history  Current Outpatient Prescriptions:     amoxicillin (AMOXIL) 875 mg tablet, Take 1 tablet (875 mg total) by mouth 2 (two) times a day for 10 days, Disp: 20 tablet, Rfl: 0    aspirin 81 mg chewable tablet, Chew 4 tablets (324 mg total) daily, Disp: 20 tablet, Rfl: 0    atorvastatin (LIPITOR) 20 mg tablet, Take by mouth daily  , Disp: , Rfl:     flecainide (TAMBOCOR) 50 mg tablet, Take 1 tablet (50 mg total) by mouth 2 (two) times a day for 14 days, Disp: 28 tablet, Rfl: 0    Magnesium 500 MG CAPS, Take by mouth, Disp: , Rfl:     Multiple Vitamin (MULTI VITAMIN DAILY PO), Take 1 tablet by mouth daily, Disp: , Rfl:     nebivolol (BYSTOLIC) 20 MG tablet, Take 1 tablet (20 mg total) by mouth daily, Disp: 90 tablet, Rfl: 3  No Known Allergies    Review of Systems:  Review of Systems   Constitutional: Positive for fatigue  HENT: Negative  Eyes: Negative  Respiratory: Negative  Cardiovascular: Negative  Gastrointestinal: Negative  Endocrine: Negative  Genitourinary: Negative  Musculoskeletal: Negative  Skin: Negative  Allergic/Immunologic: Negative  Neurological: Negative  Hematological: Negative  Psychiatric/Behavioral: Negative          Vitals:    02/22/18 1500   BP: 120/70   BP Location: Left arm   Patient Position: Sitting   Cuff Size: Large   Pulse: (!) 47   SpO2: 98%   Weight: 131 kg (289 lb)   Height: 6' 3" (1 905 m)     Physical Exam:  Physical Exam   Constitutional: He is oriented to person, place, and time  He appears well-nourished  No distress  HENT:   Head: Normocephalic and atraumatic  Right Ear: External ear normal    Left Ear: External ear normal    Eyes: Conjunctivae are normal  Pupils are equal, round, and reactive to light  Right eye exhibits no discharge  Left eye exhibits no discharge  No scleral icterus  Neck: Normal range of motion  Neck supple  No JVD present  No tracheal deviation present  No thyromegaly present  Cardiovascular: Normal rate and regular rhythm  Exam reveals no gallop and no friction rub  No murmur heard  Pulmonary/Chest: Effort normal and breath sounds normal  No stridor  No respiratory distress  He has no wheezes  He has no rales  He exhibits no tenderness  Abdominal: Soft  Bowel sounds are normal  He exhibits no distension and no mass  There is no tenderness  There is no rebound and no guarding  Musculoskeletal: Normal range of motion  He exhibits no edema, tenderness or deformity  Neurological: He is alert and oriented to person, place, and time  He has normal reflexes  No cranial nerve deficit  He exhibits normal muscle tone  Coordination normal    Skin: Skin is warm and dry  No rash noted  He is not diaphoretic  No erythema  No pallor  Psychiatric: He has a normal mood and affect  His behavior is normal  Judgment and thought content normal        Labs:   reviewed    Imaging & Testing   I have personally reviewed pertinent reports  EKG: Personally reviewed      Sinus bradycardia normal qtc no acute st/twave changes    Cardiac testing:   Results for orders placed during the hospital encounter of 02/15/18   Echo complete with contrast if indicated    [address]  [address], [place] [address]  [phone]    Transthoracic Echocardiogram  2D, M-mode, Doppler, and Color Doppler    Study date:  15-Feb-2018    Patient: Hawk Horne  MR number: [id number]  Account number: [de-identified]  : 1975  Age: 43 years  Gender: Male  Status: Routine  Location: Emergency room  Height: 75 in  Weight: 273 5 lb  BP: 80069/ 73 mmHg    Indications: A Fib , Abnormal heart sound    Diagnoses: I48 0 - Atrial fibrillation    Sonographer:  TIFFANIE Burgos  Primary Physician:  Debbie Begum Double:  Mago Lazaro [facility]  Interpreting Physician:  Lashaun Carney MD    SUMMARY    LEFT VENTRICLE:  Systolic function was normal by Teichholz  Ejection fraction was estimated in the range of 50 % to 55 % to be 52 %  There were no regional wall motion abnormalities  Wall thickness was at the upper limits of normal     LEFT ATRIUM:  The atrium was mildly to moderately dilated  RIGHT ATRIUM:  The atrium was mildly dilated  HISTORY: PRIOR HISTORY: HTN, Hyperlipidemia    PROCEDURE: The procedure was performed in the emergency room  This was a routine study  The transthoracic approach was used  The study included complete 2D imaging, M-mode, complete spectral Doppler, and color Doppler  The heart rate was  51 bpm, at the start of the study  Images were obtained from the parasternal, apical, subcostal, and suprasternal notch acoustic windows  Image quality was adequate  LEFT VENTRICLE: Size was normal  Systolic function was normal by Teichholz  Ejection fraction was estimated in the range of 50 % to 55 % to be 52 %  There were no regional wall motion abnormalities  Wall thickness was at the upper limits  of normal  No evidence of apical thrombus  DOPPLER: Left ventricular diastolic function parameters were normal     RIGHT VENTRICLE: The size was normal  Systolic function was normal  Wall thickness was normal     LEFT ATRIUM: The atrium was mildly to moderately dilated      RIGHT ATRIUM: The atrium was mildly dilated  MITRAL VALVE: Valve structure was normal  There was normal leaflet separation  DOPPLER: The transmitral velocity was within the normal range  There was no evidence for stenosis  There was no significant regurgitation  AORTIC VALVE: The valve was trileaflet  Leaflets exhibited mildly increased thickness and normal cuspal separation  DOPPLER: Transaortic velocity was within the normal range  There was no evidence for stenosis  There was no significant  regurgitation  TRICUSPID VALVE: The valve structure was normal  There was normal leaflet separation  DOPPLER: The transtricuspid velocity was within the normal range  There was no evidence for stenosis  There was no significant regurgitation  PULMONIC VALVE: Leaflets exhibited normal thickness, no calcification, and normal cuspal separation  DOPPLER: The transpulmonic velocity was within the normal range  There was no significant regurgitation  PERICARDIUM: There was no pericardial effusion  The pericardium was normal in appearance  AORTA: The root exhibited normal size  SYSTEMIC VEINS: IVC: The inferior vena cava was normal in size  SYSTEM MEASUREMENT TABLES    2D mode  AoR Diam 2D: 3 4 cm  LA Diam (2D): 4 5 cm  LA/Ao (2D): 1 32  FS (2D Teich): 27 4 %  IVSd (2D): 0 97 cm  LVDEV: 146 cm³  LVEDV MOD BP: 180 cm³  LVESV: 68 8 cm³  LVIDd(2D): 5 47 cm  LVISd (2D): 3 97 cm  LVOT Area 2D: 3 14 cm squared  LVPWd (2D): 1 11 cm  SV (Teich): 77 2 cm³    Apical four chamber  LVEF A4C: 54 %    Apical two chamber  LA Area: 26 4 cm squared  LA Volume: 99 cm³  LVEF A2C: 52 %    Unspecified Scan Mode  NATALIE Cont Eq (Peak Bonifacio): 2 61 cm squared  LVOT Diam : 2 cm  LVOT Vmax: 1170 mm/s  LVOT Vmax; Mean: 1170 mm/s  Peak Grad ; Mean: 5 mm[Hg]  MV Peak A Bonifacio: 578 mm/s  MV Peak E Bonifacio   Mean: 884 mm/s  MVA (PHT): 4 31 cm squared  PHT: 51 ms  RA Area: 19 6 cm squared  RA Volume: 57 4 cm³  TAPSE: 2 3 cm    [facility]    Prepared and electronically signed by    Ivonne Kilgore MD  Signed 15-Feb-2018 10:12:16       No results found for this or any previous visit  No results found for this or any previous visit  No results found for this or any previous visit  Ivonne Tom  Please call with any questions or suggestions    A description of the counseling:   Goals and Barriers:  Patient's ability to self care:  Medication side effect reviewed with patient in detail and all their questions answered  "This note has been constructed using a voice recognition system  Therefore there may be syntax, spelling, and/or grammatical errors   Please call if you have any questions  "

## 2018-02-26 NOTE — RESULT NOTES
Discussion/Summary   will discuss labs at follow up appt     Verified Results  (1) LIPID PANEL, FASTING [id number] 07:47AM Ene[facility]     Test Name Result Flag Reference   Cholesterol, Total 188 mg/dL  100-199   Triglycerides 186 mg/dL H 0-149   HDL Cholesterol 49 mg/dL  >39   VLDL Cholesterol Landon 37 mg/dL  5-40   LDL Cholesterol Calc 102 mg/dL H 0-99   T  Chol/HDL Ratio 3 8 ratio units  0 0-5 0   T  Chol/HDL Ratio                                                             Men  Women                                               1/2 Avg  Risk  3 4    3 3                                                   Avg Risk  5 0    4 4                                                2X Avg  Risk  9 6    7 1                                                3X Avg  Risk 23 4   11 0     (1) COMPREHENSIVE METABOLIC PANEL [id number] 71:61YS E[facility]     Test Name Result Flag Reference   Glucose, Serum 94 mg/dL  65-99   BUN 25 mg/dL H 6-24   Creatinine, Serum 0 88 mg/dL  0 76-1 27   BUN/Creatinine Ratio 28 H 9-20   Sodium, Serum 144 mmol/L  134-144   Potassium, Serum 4 2 mmol/L  3 5-5 2   Chloride, Serum 100 mmol/L     Carbon Dioxide, Total 25 mmol/L  18-29   Calcium, Serum 9 5 mg/dL  8 7-10 2   Protein, Total, Serum 6 9 g/dL  6 0-8 5   Albumin, Serum 4 6 g/dL  3 5-5 5   Globulin, Total 2 3 g/dL  1 5-4 5   A/G Ratio 2 0  1 2-2 2   Bilirubin, Total 1 1 mg/dL  0 0-1 2   Alkaline Phosphatase, S 54 IU/L     AST (SGOT) 29 IU/L  0-40   ALT (SGPT) 33 IU/L  0-44   eGFR If NonAfricn Am 106 mL/min/1 73  >59   eGFR If Africn Am 122 mL/min/1 73  >59     [facility]) Cardiovascular Risk Assessment [id number] 07:47AM Eneida [facility]     Test Name Result Flag Reference   Interpretation Note     Supplemental report is available  PDF Image

## 2018-03-05 DIAGNOSIS — I48.91 ATRIAL FIBRILLATION, UNSPECIFIED TYPE (HCC): ICD-10-CM

## 2018-03-05 RX ORDER — FLECAINIDE ACETATE 50 MG/1
50 TABLET ORAL 2 TIMES DAILY
Qty: 60 TABLET | Refills: 5 | Status: SHIPPED | OUTPATIENT
Start: 2018-03-05 | End: 2018-03-22

## 2018-03-09 ENCOUNTER — HOSPITAL ENCOUNTER (EMERGENCY)
Facility: HOSPITAL | Age: 43
Discharge: HOME | End: 2018-03-09
Attending: EMERGENCY MEDICINE | Admitting: EMERGENCY MEDICINE
Payer: COMMERCIAL

## 2018-03-09 ENCOUNTER — APPOINTMENT (EMERGENCY)
Dept: RADIOLOGY | Facility: HOSPITAL | Age: 43
End: 2018-03-09
Payer: COMMERCIAL

## 2018-03-09 VITALS
OXYGEN SATURATION: 98 % | RESPIRATION RATE: 15 BRPM | SYSTOLIC BLOOD PRESSURE: 136 MMHG | TEMPERATURE: 97.8 F | DIASTOLIC BLOOD PRESSURE: 84 MMHG | HEART RATE: 48 BPM | BODY MASS INDEX: 35 KG/M2 | WEIGHT: 280 LBS

## 2018-03-09 DIAGNOSIS — I48.0 PAROXYSMAL A-FIB (HCC): Primary | ICD-10-CM

## 2018-03-09 LAB
ALBUMIN SERPL BCP-MCNC: 4.6 G/DL (ref 3.5–5)
ALP SERPL-CCNC: 71 U/L (ref 46–116)
ALT SERPL W P-5'-P-CCNC: 43 U/L (ref 12–78)
ANION GAP SERPL CALCULATED.3IONS-SCNC: 7 MMOL/L (ref 4–13)
APTT PPP: 29 SECONDS (ref 23–35)
AST SERPL W P-5'-P-CCNC: 23 U/L (ref 5–45)
BASOPHILS # BLD AUTO: 0 THOUSANDS/ΜL (ref 0–0.1)
BASOPHILS NFR BLD AUTO: 1 % (ref 0–1)
BILIRUB SERPL-MCNC: 1 MG/DL (ref 0.2–1)
BUN SERPL-MCNC: 18 MG/DL (ref 5–25)
CALCIUM SERPL-MCNC: 10.2 MG/DL (ref 8.3–10.1)
CHLORIDE SERPL-SCNC: 106 MMOL/L (ref 100–108)
CO2 SERPL-SCNC: 29 MMOL/L (ref 21–32)
CREAT SERPL-MCNC: 0.9 MG/DL (ref 0.6–1.3)
EOSINOPHIL # BLD AUTO: 0.1 THOUSAND/ΜL (ref 0–0.61)
EOSINOPHIL NFR BLD AUTO: 2 % (ref 0–6)
ERYTHROCYTE [DISTWIDTH] IN BLOOD BY AUTOMATED COUNT: 12.9 % (ref 11.6–15.1)
GFR SERPL CREATININE-BSD FRML MDRD: 105 ML/MIN/1.73SQ M
GLUCOSE SERPL-MCNC: 98 MG/DL (ref 65–140)
HCT VFR BLD AUTO: 46.1 % (ref 42–52)
HGB BLD-MCNC: 15.7 G/DL (ref 14–18)
INR PPP: 1.05 (ref 0.86–1.16)
LYMPHOCYTES # BLD AUTO: 3.7 THOUSANDS/ΜL (ref 0.6–4.47)
LYMPHOCYTES NFR BLD AUTO: 52 % (ref 14–44)
MCH RBC QN AUTO: 31.1 PG (ref 27–31)
MCHC RBC AUTO-ENTMCNC: 34.1 G/DL (ref 31.4–37.4)
MCV RBC AUTO: 91 FL (ref 82–98)
MONOCYTES # BLD AUTO: 0.6 THOUSAND/ΜL (ref 0.17–1.22)
MONOCYTES NFR BLD AUTO: 8 % (ref 4–12)
NEUTROPHILS # BLD AUTO: 2.7 THOUSANDS/ΜL (ref 1.85–7.62)
NEUTS SEG NFR BLD AUTO: 38 % (ref 43–75)
NRBC BLD AUTO-RTO: 0 /100 WBCS
PLATELET # BLD AUTO: 168 THOUSANDS/UL (ref 130–400)
PLATELET BLD QL SMEAR: ADEQUATE
PMV BLD AUTO: 8.9 FL (ref 8.9–12.7)
POTASSIUM SERPL-SCNC: 3.6 MMOL/L (ref 3.5–5.3)
PROT SERPL-MCNC: 7.9 G/DL (ref 6.4–8.2)
PROTHROMBIN TIME: 11 SECONDS (ref 9.4–11.7)
RBC # BLD AUTO: 5.05 MILLION/UL (ref 4.7–6.1)
SODIUM SERPL-SCNC: 142 MMOL/L (ref 136–145)
TROPONIN I SERPL-MCNC: <0.02 NG/ML
TROPONIN I SERPL-MCNC: <0.02 NG/ML
WBC # BLD AUTO: 7 THOUSAND/UL (ref 4.8–10.8)

## 2018-03-09 PROCEDURE — 96374 THER/PROPH/DIAG INJ IV PUSH: CPT

## 2018-03-09 PROCEDURE — 36415 COLL VENOUS BLD VENIPUNCTURE: CPT | Performed by: EMERGENCY MEDICINE

## 2018-03-09 PROCEDURE — 85025 COMPLETE CBC W/AUTO DIFF WBC: CPT | Performed by: EMERGENCY MEDICINE

## 2018-03-09 PROCEDURE — 84484 ASSAY OF TROPONIN QUANT: CPT | Performed by: EMERGENCY MEDICINE

## 2018-03-09 PROCEDURE — 85730 THROMBOPLASTIN TIME PARTIAL: CPT | Performed by: EMERGENCY MEDICINE

## 2018-03-09 PROCEDURE — 80053 COMPREHEN METABOLIC PANEL: CPT | Performed by: EMERGENCY MEDICINE

## 2018-03-09 PROCEDURE — 93005 ELECTROCARDIOGRAM TRACING: CPT | Performed by: EMERGENCY MEDICINE

## 2018-03-09 PROCEDURE — 85610 PROTHROMBIN TIME: CPT | Performed by: EMERGENCY MEDICINE

## 2018-03-09 PROCEDURE — 71045 X-RAY EXAM CHEST 1 VIEW: CPT

## 2018-03-09 PROCEDURE — 99285 EMERGENCY DEPT VISIT HI MDM: CPT

## 2018-03-09 PROCEDURE — 96361 HYDRATE IV INFUSION ADD-ON: CPT

## 2018-03-09 RX ORDER — METOPROLOL TARTRATE 5 MG/5ML
5 INJECTION INTRAVENOUS ONCE
Status: COMPLETED | OUTPATIENT
Start: 2018-03-09 | End: 2018-03-09

## 2018-03-09 RX ORDER — FLECAINIDE ACETATE 50 MG/1
50 TABLET ORAL EVERY 12 HOURS SCHEDULED
Status: DISCONTINUED | OUTPATIENT
Start: 2018-03-09 | End: 2018-03-09 | Stop reason: HOSPADM

## 2018-03-09 RX ADMIN — METOPROLOL TARTRATE 25 MG: 25 TABLET ORAL at 06:07

## 2018-03-09 RX ADMIN — FLECAINIDE ACETATE 50 MG: 50 TABLET ORAL at 06:45

## 2018-03-09 RX ADMIN — SODIUM CHLORIDE 1000 ML: 0.9 INJECTION, SOLUTION INTRAVENOUS at 05:39

## 2018-03-09 RX ADMIN — METOPROLOL TARTRATE 5 MG: 5 INJECTION INTRAVENOUS at 05:58

## 2018-03-09 RX ADMIN — METOPROLOL TARTRATE 5 MG: 5 INJECTION INTRAVENOUS at 05:44

## 2018-03-09 NOTE — DISCHARGE INSTRUCTIONS
A-fib (Atrial Fibrillation)   WHAT YOU NEED TO KNOW:   A-fib may come and go, or it may be a long-term condition  A-fib can cause blood clots, stroke, or heart failure  These conditions may become life-threatening  It is important to treat and manage a-fib to help prevent a blood clot, stroke, or heart failure  DISCHARGE INSTRUCTIONS:   Call 911 for any of the following:   · You have any of the following signs of a heart attack:      ¨ Squeezing, pressure, or pain in your chest that lasts longer than 5 minutes or returns    ¨ Discomfort or pain in your back, neck, jaw, stomach, or arm     ¨ Trouble breathing    ¨ Nausea or vomiting    ¨ Lightheadedness or a sudden cold sweat, especially with chest pain or trouble breathing    · You have any of the following signs of a stroke:      ¨ Numbness or drooping on one side of your face     ¨ Weakness in an arm or leg    ¨ Confusion or difficulty speaking    ¨ Dizziness, a severe headache, or vision loss  Seek care immediately if:  You have any of the following signs of a blood clot:  · You feel lightheaded, are short of breath, and have chest pain  · You cough up blood  · You have swelling, redness, pain, or warmth in your arm or leg  Contact your cardiologist or healthcare provider if:   · Your heart rate is higher than your healthcare provider said it should be  · You have new or worsening swelling in your legs, feet, ankles, or abdomen  · You are short of breath, even at rest      · You have questions or concerns about your condition or care  Medicines: You may need any of the following:  · Heart medicines  help control your heart rate and rhythm  You may need more than one medicine to treat your symptoms  · Blood thinners    help prevent blood clots  Examples of blood thinners include heparin and warfarin  Clots can cause strokes, heart attacks, and death   The following are general safety guidelines to follow while you are taking a blood thinner:    ¨ Watch for bleeding and bruising while you take blood thinners  Watch for bleeding from your gums or nose  Watch for blood in your urine and bowel movements  Use a soft washcloth on your skin, and a soft toothbrush to brush your teeth  This can keep your skin and gums from bleeding  If you shave, use an electric shaver  Do not play contact sports  ¨ Tell your dentist and other healthcare providers that you take anticoagulants  Wear a bracelet or necklace that says you take this medicine  ¨ Do not start or stop any medicines unless your healthcare provider tells you to  Many medicines cannot be used with blood thinners  ¨ Tell your healthcare provider right away if you forget to take the medicine, or if you take too much  ¨ Warfarin  is a blood thinner that you may need to take  The following are things you should be aware of if you take warfarin  § Foods and medicines can affect the amount of warfarin in your blood  Do not make major changes to your diet while you take warfarin  Warfarin works best when you eat about the same amount of vitamin K every day  Vitamin K is found in green leafy vegetables and certain other foods  Ask for more information about what to eat when you are taking warfarin  § You will need to see your healthcare provider for follow-up visits when you are on warfarin  You will need regular blood tests  These tests are used to decide how much medicine you need  · Antiplatelets , such as aspirin, help prevent blood clots  Take your antiplatelet medicine exactly as directed  These medicines make it more likely for you to bleed or bruise  If you are told to take aspirin, do not take acetaminophen or ibuprofen instead  · Take your medicine as directed  Contact your healthcare provider if you think your medicine is not helping or if you have side effects  Tell him or her if you are allergic to any medicine   Keep a list of the medicines, vitamins, and herbs you take  Include the amounts, and when and why you take them  Bring the list or the pill bottles to follow-up visits  Carry your medicine list with you in case of an emergency  Follow up with your cardiologist as directed: You will need regular blood tests and monitoring  Write down your questions so you remember to ask them during your visits  Manage A-fib:   · Know your target heart rate  Learn how to take your pulse and monitor your heart rate  · Manage other health conditions  This includes high blood pressure, sleep apnea, thyroid disease, diabetes, and other heart conditions  Take medicine as directed and follow your treatment plan  · Limit or do not drink alcohol  Alcohol can make a-fib hard to manage  Ask your healthcare provider if it is safe for you to drink alcohol  A drink of alcohol is 12 ounces of beer, 5 ounces of wine, or 1½ ounces of liquor  · Do not smoke  Nicotine and other chemicals in cigarettes and cigars can cause heart and lung damage  Ask your healthcare provider for information if you currently smoke and need help to quit  E-cigarettes or smokeless tobacco still contain nicotine  Talk to your healthcare provider before you use these products  · Eat heart-healthy foods  Heart healthy foods will help keep your cholesterol low  These include fruits, vegetables, whole-grain breads, low-fat dairy products, beans, lean meats, and fish  Replace butter and margarine with heart-healthy oils such as olive oil and canola oil  · Maintain a healthy weight  Ask your healthcare provider how much you should weigh  Ask him to help you create a weight loss plan if you are overweight  · Exercise for 30 minutes  most days of the week  Ask your healthcare provider about the best exercise plan for you  © 2017 [address] [employer] Information is for End User's use only and may not be sold, redistributed or otherwise used for commercial purposes   All illustrations and images included in CareNotes® are the copyrighted property of [employer]  or Clint Valenzuela  The above information is an  only  It is not intended as medical advice for individual conditions or treatments  Talk to your doctor, nurse or pharmacist before following any medical regimen to see if it is safe and effective for you

## 2018-03-09 NOTE — ED PROVIDER NOTES
History  Chief Complaint   Patient presents with    Rapid Heart Rate     said he woke up about an hour ago and felt his heart racing similar to when he goes into A-Fib     Patient has a recent history of paroxysmal atrial fibrillation was treated here few months ago  Patient has not followed up with his CPAP testing for possible sleep apnea  He has been compliant with medications and limit his caffeine intake to 2 cups a day  He has been in his normal health with no fever, a mild cough but no respiratory problems  Patient states that he was awakened from a sound sleep at about 0500 hours with palpitations of the chest and a rapid heart rate  He states he felt like he was having an anxiety attack  He tried walking around the bedroom and deep breathing without relief  Prior to Admission Medications   Prescriptions Last Dose Informant Patient Reported? Taking? Magnesium 500 MG CAPS 3/8/2018 at Unknown time Self Yes Yes   Sig: Take by mouth   Multiple Vitamin (MULTI VITAMIN DAILY PO) 3/8/2018 at Unknown time Self Yes Yes   Sig: Take 1 tablet by mouth daily   aspirin 81 mg chewable tablet 3/9/2018 at Unknown time Self No Yes   Sig: Chew 4 tablets (324 mg total) daily   atorvastatin (LIPITOR) 20 mg tablet 3/9/2018 at Unknown time Self Yes Yes   Sig: Take by mouth daily     flecainide (TAMBOCOR) 50 mg tablet 3/9/2018 at Unknown time  No Yes   Sig: Take 1 tablet (50 mg total) by mouth 2 (two) times a day for 30 days   nebivolol (BYSTOLIC) 20 MG tablet 0/4/2986 at Unknown time  No Yes   Sig: Take 1 tablet (20 mg total) by mouth daily      Facility-Administered Medications: None       Past Medical History:   Diagnosis Date    Atrial fibrillation (HCC)     Benign essential hypertension     H/O nausea and vomiting     H/O vitamin D deficiency     Hyperlipidemia     Hypertension     Mixed hyperlipidemia        History reviewed  No pertinent surgical history      Family History   Problem Relation Age of Onset    Arthritis Mother     Hypertension Father     Prostate cancer Father     Cancer Family     Diabetes Family     Heart disease Family     Other Family      hyperlipoprotein     I have reviewed and agree with the history as documented  Social History   Substance Use Topics    Smoking status: Never Smoker    Smokeless tobacco: Never Used    Alcohol use Yes      Comment: Occ        Review of Systems   Constitutional: Negative for fever  HENT: Negative for congestion  Respiratory: Positive for cough  Negative for chest tightness, shortness of breath and stridor  Cardiovascular: Positive for palpitations  Negative for chest pain and leg swelling  Gastrointestinal: Negative for abdominal pain, nausea and vomiting  Musculoskeletal: Negative for arthralgias and back pain  Skin: Negative for rash  Hematological: Does not bruise/bleed easily  All other systems reviewed and are negative  Physical Exam  ED Triage Vitals [03/09/18 0522]   Temperature Pulse Respirations Blood Pressure SpO2   97 8 °F (36 6 °C) 92 16 (!) 207/115 94 %      Temp Source Heart Rate Source Patient Position - Orthostatic VS BP Location FiO2 (%)   Oral Monitor Sitting Left arm --      Pain Score       No Pain           Orthostatic Vital Signs  Vitals:    03/09/18 0522   BP: (!) 207/115   Pulse: 92   Patient Position - Orthostatic VS: Sitting       Physical Exam   Constitutional: He is oriented to person, place, and time  He appears well-developed and well-nourished  HENT:   Head: Normocephalic and atraumatic  Mouth/Throat: Oropharynx is clear and moist    Eyes: Conjunctivae are normal    Neck: Normal range of motion  Neck supple  No JVD present  Cardiovascular: Normal heart sounds  Rapid ventricular response, irregular rhythm   Pulmonary/Chest: Effort normal and breath sounds normal  He has no wheezes  He has no rales  Abdominal: Soft   Bowel sounds are normal    Musculoskeletal: Normal range of motion  He exhibits no edema  Neurological: He is alert and oriented to person, place, and time  Skin: Skin is warm and dry  Psychiatric: He has a normal mood and affect  His behavior is normal    Nursing note and vitals reviewed  ED Medications  Medications   sodium chloride 0 9 % bolus 1,000 mL (1,000 mL Intravenous New Bag 3/9/18 0539)   metoprolol (LOPRESSOR) injection 5 mg (not administered)       Diagnostic Studies  Results Reviewed     Procedure Component Value Units Date/Time    CBC and differential [[id number]] Collected:  03/09/18 0532    Lab Status: In process Specimen:  Blood from Arm, Right Updated:  03/09/18 [employer] [[id number]] Collected:  03/09/18 0532    Lab Status: In process Specimen:  Blood from Arm, Right Updated:  03/09/18 0537    APTT [[id number]] Collected:  03/09/18 0532    Lab Status: In process Specimen:  Blood from Arm, Right Updated:  03/09/18 0537    Comprehensive metabolic panel [[id number]] Collected:  03/09/18 0532    Lab Status: In process Specimen:  Blood from Arm, Right Updated:  03/09/18 0536    Troponin I [[id number]] Collected:  03/09/18 0532    Lab Status:   In process Specimen:  Blood from Arm, Right Updated:  03/09/18 0536                 XR chest 1 view portable    (Results Pending)              Procedures  ECG 12 Lead Documentation  Date/Time: 3/9/2018 5:27 AM  Performed by: Jovan Merrill  Authorized by: Jovan Merrill     Indications / Diagnosis:  Rapid heart rate  ECG reviewed by me, the ED Provider: yes    Patient location:  ED  Interpretation:     Interpretation: abnormal    Rate:     ECG rate:  129    ECG rate assessment: tachycardic    Rhythm:     Rhythm: atrial fibrillation    Ectopy:     Ectopy: none    QRS:     QRS axis:  Normal    QRS intervals:  Normal  Conduction:     Conduction: normal    ST segments:     ST segments:  Non-specific  T waves:     T waves: normal             Phone Contacts  ED Phone Contact    ED Course  ED Course MDM  Number of Diagnoses or Management Options  Diagnosis management comments: Patient is having paroxysms of atrial fibrillation  Will try to control his ventricular response with beta-blocker 1st    CritCare Time    Disposition  Final diagnoses:   None     ED Disposition     None      Follow-up Information    None       Patient's Medications   Discharge Prescriptions    No medications on file     No discharge procedures on file      ED Provider  Electronically Signed by           Amanda Gaffney MD  03/09/18 8567

## 2018-03-11 LAB
ATRIAL RATE: 138 BPM
QRS AXIS: 3 DEGREES
QRSD INTERVAL: 92 MS
QT INTERVAL: 348 MS
QTC INTERVAL: 509 MS
T WAVE AXIS: 35 DEGREES
VENTRICULAR RATE: 129 BPM

## 2018-03-11 PROCEDURE — 93010 ELECTROCARDIOGRAM REPORT: CPT | Performed by: INTERNAL MEDICINE

## 2018-03-12 ENCOUNTER — VBI (OUTPATIENT)
Dept: ADMINISTRATIVE | Facility: OTHER | Age: 43
End: 2018-03-12

## 2018-03-15 LAB
CHEST PAIN STATEMENT: NORMAL
MAX DIASTOLIC BP: 90 MMHG
MAX HEART RATE: 148 BPM
MAX PREDICTED HEART RATE: 178 BPM
MAX. SYSTOLIC BP: 220 MMHG
PROTOCOL NAME: NORMAL
TARGET HR FORMULA: NORMAL
TEST INDICATION: NORMAL
TIME IN EXERCISE PHASE: NORMAL

## 2018-03-22 ENCOUNTER — OFFICE VISIT (OUTPATIENT)
Dept: CARDIOLOGY CLINIC | Facility: CLINIC | Age: 43
End: 2018-03-22
Payer: COMMERCIAL

## 2018-03-22 VITALS
HEART RATE: 44 BPM | OXYGEN SATURATION: 98 % | HEIGHT: 75 IN | SYSTOLIC BLOOD PRESSURE: 144 MMHG | BODY MASS INDEX: 35.81 KG/M2 | WEIGHT: 288 LBS | DIASTOLIC BLOOD PRESSURE: 80 MMHG

## 2018-03-22 DIAGNOSIS — R00.2 PALPITATIONS: ICD-10-CM

## 2018-03-22 DIAGNOSIS — I48.91 ATRIAL FIBRILLATION, UNSPECIFIED TYPE (HCC): Primary | ICD-10-CM

## 2018-03-22 DIAGNOSIS — G47.20 ABNORMAL CIRCADIAN RHYTHM: ICD-10-CM

## 2018-03-22 PROCEDURE — 99215 OFFICE O/P EST HI 40 MIN: CPT | Performed by: INTERNAL MEDICINE

## 2018-03-22 PROCEDURE — 93000 ELECTROCARDIOGRAM COMPLETE: CPT | Performed by: INTERNAL MEDICINE

## 2018-03-22 RX ORDER — NEBIVOLOL 10 MG/1
10 TABLET ORAL DAILY
COMMUNITY
End: 2018-08-21 | Stop reason: SDUPTHER

## 2018-03-22 RX ORDER — FLECAINIDE ACETATE 100 MG/1
100 TABLET ORAL 2 TIMES DAILY
Qty: 180 TABLET | Refills: 1 | Status: SHIPPED | OUTPATIENT
Start: 2018-03-22 | End: 2018-06-28 | Stop reason: SDUPTHER

## 2018-03-22 NOTE — PROGRESS NOTES
Cardiology Follow Up  Deion Flores  1975  [id number]  [facility] [address] [address]    1  Atrial fibrillation, unspecified type ([address] )  Home Study    POCT ECG   2  Palpitations  Home Study   3  Abnormal circadian rhythm  Home Study      Discussion/Plan:  Paroxysmal afib- had second event at night? He was in afib with rvr and converted with additional beta-blocker  He wasn't taken his flecainide consistently before his second episode  Plan for sleep screen and follow-up with EP for consideration of ablation  Will refer to EP- does not want to await appointment at [facility]  Continue bystolic + increase flecainide 100mg twice a day  He is taking aspirin 162mg twice a day with food  Repeat ekg on Monday for qtc/qrs    Abnormal circadian rhythm-  Sleep screen    Interval History:  He was not taking flecainide consistently  Had second breakthru episode  No taking meds consistently  Denies having alcohol usage  Cut down his caffeine  Patient Active Problem List   Diagnosis    Benign essential hypertension    Fatty liver disease, nonalcoholic    Mixed hyperlipidemia    Obesity    Organic impotence    Palpitations    Atrial fibrillation (HCC)     Past Medical History:   Diagnosis Date    Atrial fibrillation (HCC)     Benign essential hypertension     H/O nausea and vomiting     H/O vitamin D deficiency     Hyperlipidemia     Hypertension     Mixed hyperlipidemia      Social History     Social History    Marital status: Single     Spouse name: N/A    Number of children: N/A    Years of education: N/A     Occupational History    Not on file  Social History Main Topics    Smoking status: Never Smoker    Smokeless tobacco: Never Used    Alcohol use Yes      Comment:  Occ    Drug use: No    Sexual activity: Not on file     Other Topics Concern    Not on file Social History Narrative    No narrative on file      Family History   Problem Relation Age of Onset    Arthritis Mother     Hypertension Father     Prostate cancer Father     Cancer Family     Diabetes Family     Heart disease Family     Other Family      hyperlipoprotein     History reviewed  No pertinent surgical history  Current Outpatient Prescriptions:     aspirin 81 mg chewable tablet, Chew 4 tablets (324 mg total) daily, Disp: 20 tablet, Rfl: 0    atorvastatin (LIPITOR) 20 mg tablet, Take by mouth daily  , Disp: , Rfl:     flecainide (TAMBOCOR) 50 mg tablet, Take 1 tablet (50 mg total) by mouth 2 (two) times a day for 30 days, Disp: 60 tablet, Rfl: 5    Magnesium 500 MG CAPS, Take by mouth, Disp: , Rfl:     Multiple Vitamin (MULTI VITAMIN DAILY PO), Take 1 tablet by mouth daily, Disp: , Rfl:     nebivolol (BYSTOLIC) 10 mg tablet, Take 10 mg by mouth daily, Disp: , Rfl:   No Known Allergies    Review of Systems:  Review of Systems   Constitutional: Negative  HENT: Negative  Eyes: Negative  Respiratory: Negative  Cardiovascular: Negative  Gastrointestinal: Negative  Endocrine: Negative  Genitourinary: Negative  Musculoskeletal: Negative  Skin: Negative  Allergic/Immunologic: Negative  Neurological: Negative  Hematological: Negative  Psychiatric/Behavioral: Negative  Vitals:    03/22/18 1343   BP: 144/80   BP Location: Right arm   Patient Position: Sitting   Cuff Size: Large   Pulse: (!) 44   SpO2: 98%   Weight: 131 kg (288 lb)   Height: 6' 3" (1 905 m)     Physical Exam:  Physical Exam   Constitutional: He is oriented to person, place, and time  No distress  HENT:   Head: Normocephalic and atraumatic  Right Ear: External ear normal    Left Ear: External ear normal    Eyes: Conjunctivae are normal  Pupils are equal, round, and reactive to light  Right eye exhibits no discharge  Left eye exhibits no discharge  No scleral icterus     Neck: Normal range of motion  Neck supple  No JVD present  No tracheal deviation present  No thyromegaly present  Cardiovascular: Normal rate and regular rhythm  Exam reveals no friction rub  No murmur heard  Pulmonary/Chest: Effort normal and breath sounds normal  No stridor  No respiratory distress  He has no wheezes  He has no rales  He exhibits no tenderness  Abdominal: Soft  Bowel sounds are normal  He exhibits no distension and no mass  There is no tenderness  There is no rebound and no guarding  Musculoskeletal: Normal range of motion  He exhibits no edema, tenderness or deformity  Neurological: He is alert and oriented to person, place, and time  He has normal reflexes  No cranial nerve deficit  He exhibits normal muscle tone  Coordination normal    Skin: Skin is warm and dry  No rash noted  He is not diaphoretic  No erythema  No pallor  Psychiatric: His behavior is normal  Judgment and thought content normal  His mood appears anxious  Nursing note and vitals reviewed        Labs:     Lab Results   Component Value Date    WBC 7 00 03/09/2018    HGB 15 7 03/09/2018    HCT 46 1 03/09/2018    MCV 91 03/09/2018     03/09/2018     Lab Results   Component Value Date     03/09/2018    K 3 6 03/09/2018     03/09/2018    CO2 29 03/09/2018    ANIONGAP 7 03/09/2018    BUN 18 03/09/2018    CREATININE 0 90 03/09/2018    GLUCOSE 98 03/09/2018    CALCIUM 10 2 (H) 03/09/2018    AST 23 03/09/2018    ALT 43 03/09/2018    ALKPHOS 71 03/09/2018    PROT 7 9 03/09/2018    BILITOT 1 00 03/09/2018    EGFR 105 03/09/2018     Lab Results   Component Value Date    CHOL 188 01/10/2018    CHOL 213 (H) 09/14/2017    CHOL 176 05/12/2016     Lab Results   Component Value Date    HDL 49 01/10/2018    HDL 46 09/14/2017    HDL 43 05/12/2016     Lab Results   Component Value Date    LDLCALC 102 (H) 01/10/2018    LDLCALC 136 (H) 09/14/2017    LDLCALC 102 (H) 05/12/2016     Lab Results   Component Value Date    TRIG 186 (H) 01/10/2018    TRIG 157 (H) 2017    TRIG 153 (H) 2016     No results found for: HGBA1C    Imaging & Testing   I have personally reviewed pertinent reports  Sinus bradycardia no acute st/t wave changes    Cardiac testing:   Results for orders placed during the hospital encounter of 02/15/18   Echo complete with contrast if indicated    Narrative [address]  [address] [facility]  [phone]    Transthoracic Echocardiogram  2D, M-mode, Doppler, and Color Doppler    Study date:  15-Feb-2018    Patient: Cleve Garber  MR number: [id number]  Account number: [de-identified]  : 1975  Age: 43 years  Gender: Male  Status: Routine  Location: Emergency room  Height: 75 in  Weight: 273 5 lb  BP: 13287/ 73 mmHg    Indications: A Fib , Abnormal heart sound    Diagnoses: I48 0 - Atrial fibrillation    Sonographer:  TIFFANIE Mcdonough  Primary Physician:  Tano Tam Lown:  [facility]  Interpreting Physician:  Eneida Jerome MD    SUMMARY    LEFT VENTRICLE:  Systolic function was normal by Teichholscooter  Ejection fraction was estimated in the range of 50 % to 55 % to be 52 %  There were no regional wall motion abnormalities  Wall thickness was at the upper limits of normal     LEFT ATRIUM:  The atrium was mildly to moderately dilated  RIGHT ATRIUM:  The atrium was mildly dilated  HISTORY: PRIOR HISTORY: HTN, Hyperlipidemia    PROCEDURE: The procedure was performed in the emergency room  This was a routine study  The transthoracic approach was used  The study included complete 2D imaging, M-mode, complete spectral Doppler, and color Doppler  The heart rate was  51 bpm, at the start of the study  Images were obtained from the parasternal, apical, subcostal, and suprasternal notch acoustic windows  Image quality was adequate  LEFT VENTRICLE: Size was normal  Systolic function was normal by Teichholz   Ejection fraction was estimated in the range of 50 % to 55 % to be 52 %  There were no regional wall motion abnormalities  Wall thickness was at the upper limits  of normal  No evidence of apical thrombus  DOPPLER: Left ventricular diastolic function parameters were normal     RIGHT VENTRICLE: The size was normal  Systolic function was normal  Wall thickness was normal     LEFT ATRIUM: The atrium was mildly to moderately dilated  RIGHT ATRIUM: The atrium was mildly dilated  MITRAL VALVE: Valve structure was normal  There was normal leaflet separation  DOPPLER: The transmitral velocity was within the normal range  There was no evidence for stenosis  There was no significant regurgitation  AORTIC VALVE: The valve was trileaflet  Leaflets exhibited mildly increased thickness and normal cuspal separation  DOPPLER: Transaortic velocity was within the normal range  There was no evidence for stenosis  There was no significant  regurgitation  TRICUSPID VALVE: The valve structure was normal  There was normal leaflet separation  DOPPLER: The transtricuspid velocity was within the normal range  There was no evidence for stenosis  There was no significant regurgitation  PULMONIC VALVE: Leaflets exhibited normal thickness, no calcification, and normal cuspal separation  DOPPLER: The transpulmonic velocity was within the normal range  There was no significant regurgitation  PERICARDIUM: There was no pericardial effusion  The pericardium was normal in appearance  AORTA: The root exhibited normal size  SYSTEMIC VEINS: IVC: The inferior vena cava was normal in size      SYSTEM MEASUREMENT TABLES    2D mode  AoR Diam 2D: 3 4 cm  LA Diam (2D): 4 5 cm  LA/Ao (2D): 1 32  FS (2D Teich): 27 4 %  IVSd (2D): 0 97 cm  LVDEV: 146 cm³  LVEDV MOD BP: 180 cm³  LVESV: 68 8 cm³  LVIDd(2D): 5 47 cm  LVISd (2D): 3 97 cm  LVOT Area 2D: 3 14 cm squared  LVPWd (2D): 1 11 cm  SV (Teich): 77 2 cm³    Apical four chamber  LVEF A4C: 54 %    Apical two chamber  LA Area: 26 4 cm squared  LA Volume: 99 cm³  LVEF A2C: 52 %    Unspecified Scan Mode  NATALIE Cont Eq (Peak Bonifacio): 2 61 cm squared  LVOT Diam : 2 cm  LVOT Vmax: 1170 mm/s  LVOT Vmax; Mean: 1170 mm/s  Peak Grad ; Mean: 5 mm[Hg]  MV Peak A Bonifacio: 578 mm/s  MV Peak E Bonifacio  Mean: 884 mm/s  MVA (PHT): 4 31 cm squared  PHT: 51 ms  RA Area: 19 6 cm squared  RA Volume: 57 4 cm³  TAPSE: 2 3 cm    [facility]    Prepared and electronically signed by    Angelique Koroma MD  Signed 15-Feb-2018 10:12:16       No results found for this or any previous visit  Angelique Koroma MD [facility]  Please call with any questions or suggestions    A description of the counseling: ablation vs medical management  Goals and Barriers:  Patient's ability to self care:  Medication side effect reviewed with patient in detail and all their questions answered  "This note has been constructed using a voice recognition system  Therefore there may be syntax, spelling, and/or grammatical errors   Please call if you have any questions  "

## 2018-04-02 ENCOUNTER — HOSPITAL ENCOUNTER (OUTPATIENT)
Dept: SLEEP CENTER | Facility: CLINIC | Age: 43
Discharge: HOME | End: 2018-04-02
Payer: COMMERCIAL

## 2018-04-02 DIAGNOSIS — I48.91 ATRIAL FIBRILLATION, UNSPECIFIED TYPE (HCC): ICD-10-CM

## 2018-04-02 DIAGNOSIS — G47.20 ABNORMAL CIRCADIAN RHYTHM: ICD-10-CM

## 2018-04-02 DIAGNOSIS — R00.2 PALPITATIONS: ICD-10-CM

## 2018-04-05 ENCOUNTER — TELEPHONE (OUTPATIENT)
Dept: CARDIOLOGY CLINIC | Facility: CLINIC | Age: 43
End: 2018-04-05

## 2018-05-02 ENCOUNTER — HOSPITAL ENCOUNTER (OUTPATIENT)
Dept: SLEEP CENTER | Facility: CLINIC | Age: 43
Discharge: HOME | End: 2018-05-02
Payer: COMMERCIAL

## 2018-05-02 PROCEDURE — G0399 HOME SLEEP TEST/TYPE 3 PORTA: HCPCS

## 2018-05-08 ENCOUNTER — TELEPHONE (OUTPATIENT)
Dept: SLEEP CENTER | Facility: CLINIC | Age: 43
End: 2018-05-08

## 2018-05-08 DIAGNOSIS — G47.33 OSA (OBSTRUCTIVE SLEEP APNEA): Primary | ICD-10-CM

## 2018-05-08 NOTE — TELEPHONE ENCOUNTER
Informed patient of mild obstructive sleep apnea on home testing and recommendation to start APAP  He needs consult with Dr Estrella Tobias and set up with APAP  He will call back to schedule

## 2018-05-18 ENCOUNTER — OFFICE VISIT (OUTPATIENT)
Dept: CARDIOLOGY CLINIC | Facility: CLINIC | Age: 43
End: 2018-05-18
Payer: COMMERCIAL

## 2018-05-18 VITALS
DIASTOLIC BLOOD PRESSURE: 88 MMHG | HEIGHT: 75 IN | BODY MASS INDEX: 35.56 KG/M2 | WEIGHT: 286 LBS | SYSTOLIC BLOOD PRESSURE: 122 MMHG | HEART RATE: 48 BPM

## 2018-05-18 DIAGNOSIS — I10 BENIGN ESSENTIAL HYPERTENSION: ICD-10-CM

## 2018-05-18 DIAGNOSIS — I48.91 ATRIAL FIBRILLATION, UNSPECIFIED TYPE (HCC): ICD-10-CM

## 2018-05-18 DIAGNOSIS — G47.33 OSA (OBSTRUCTIVE SLEEP APNEA): Primary | ICD-10-CM

## 2018-05-18 DIAGNOSIS — K76.0 FATTY LIVER DISEASE, NONALCOHOLIC: ICD-10-CM

## 2018-05-18 PROCEDURE — 99245 OFF/OP CONSLTJ NEW/EST HI 55: CPT | Performed by: INTERNAL MEDICINE

## 2018-05-18 PROCEDURE — 93000 ELECTROCARDIOGRAM COMPLETE: CPT | Performed by: INTERNAL MEDICINE

## 2018-05-18 RX ORDER — FLECAINIDE ACETATE 50 MG/1
50 TABLET ORAL AS NEEDED
COMMUNITY
Start: 2018-05-16 | End: 2018-08-21 | Stop reason: SDUPTHER

## 2018-05-18 NOTE — LETTER
May 20, 2018     Abdirizak Sanches MD  [address]     Patient: Daiana Mulligan   YOB: 1975   Date of Visit: 5/18/2018       Dear Dr Chloe Rivera: Thank you for referring Daiana Mulligan to me for evaluation  Below are my notes for this consultation  If you have questions, please do not hesitate to call me  I look forward to following your patient along with you  Sincerely,        Tana Marroquin MD        CC: Yancy Manzo,   Daiana Mulligan  Tana Marroquin MD  5/20/2018  8:20 AM  Sign at close encounter                                             Cardiology Consultation     Daiana Mulligan  [id number]  1975  [facility] [address]    1  NO (obstructive sleep apnea)     2   Atrial fibrillation, unspecified type [facility])  Ambulatory referral to Cardiac Electrophysiology    POCT ECG       History of present illness    The patient has been sent to me by Dr Chloe Rivera for management of atrial fibrillation    He is [employer]   He used to weigh 315 lb and currently weighs 280 lb    First episode was in the beginning of the year, went to hospital, terminated itself  Second episode was a few weeks later, went to hospital, was put on intravenous medication and then terminated  Patient was admitted at that time, underwent stress testing, was started on flecainide  He has been doing well since then  There has been no further episodes of irregular heart rate      The patient is not complaining of anginal like chest pain or chest pressure  There is no worsening orthopnea, paroxysmal nocturnal dyspnea  There is no leg swelling    Patient does get palpitation when it happens  There is no history of presyncope or syncope  There is rare history of transient  lightheadedness  When patient has these palpitations, it is associated with exertional intolerance      There is history of snoring at night  There is history of morning fatigability  There is occasional history of daytime sleepiness          Patient Active Problem List   Diagnosis    Benign essential hypertension    Fatty liver disease, nonalcoholic    Mixed hyperlipidemia    Obesity    Organic impotence    Palpitations    Atrial fibrillation (HCC)    NO (obstructive sleep apnea)     Past Medical History:   Diagnosis Date    Atrial fibrillation (HCC)     Benign essential hypertension     H/O nausea and vomiting     H/O vitamin D deficiency     Hyperlipidemia     Hypertension     Mixed hyperlipidemia      Social History     Social History    Marital status: Single     Spouse name: N/A    Number of children: N/A    Years of education: N/A     Occupational History    Not on file  Social History Main Topics    Smoking status: Never Smoker    Smokeless tobacco: Never Used    Alcohol use Yes      Comment: Occ    Drug use: No    Sexual activity: Not on file     Other Topics Concern    Not on file     Social History Narrative    No narrative on file      Family History   Problem Relation Age of Onset    Arthritis Mother     Hypertension Father     Prostate cancer Father     Cancer Family     Diabetes Family     Heart disease Family     Other Family      hyperlipoprotein     No past surgical history on file      Current Outpatient Prescriptions:     aspirin 81 mg chewable tablet, Chew 4 tablets (324 mg total) daily, Disp: 20 tablet, Rfl: 0    atorvastatin (LIPITOR) 20 mg tablet, Take by mouth daily  , Disp: , Rfl:     flecainide (TAMBOCOR) 100 mg tablet, Take 1 tablet (100 mg total) by mouth 2 (two) times a day, Disp: 180 tablet, Rfl: 1    Magnesium 500 MG CAPS, Take by mouth, Disp: , Rfl:     Multiple Vitamin (MULTI VITAMIN DAILY PO), Take 1 tablet by mouth daily, Disp: , Rfl:     nebivolol (BYSTOLIC) 10 mg tablet, Take 10 mg by mouth daily, Disp: , Rfl:     flecainide (TAMBOCOR) 50 mg tablet, 50 mg as needed If needed for palpitations , Disp: , Rfl:   No Known Allergies  Vitals:    05/18/18 1257   BP: 122/88   BP Location: Right arm   Cuff Size: Large   Pulse: (!) 48   Weight: 130 kg (286 lb)   Height: 6' 3" (1 905 m)       Labs:  Lab Results   Component Value Date     03/09/2018     01/10/2018    K 3 6 03/09/2018    K 4 2 01/10/2018     03/09/2018     01/10/2018    CO2 29 03/09/2018    CO2 25 01/10/2018    BUN 18 03/09/2018    BUN 25 (H) 01/10/2018    CREATININE 0 90 03/09/2018    CREATININE 0 88 01/10/2018    GLUCOSE 98 03/09/2018    GLUCOSE 94 01/10/2018    CALCIUM 10 2 (H) 03/09/2018    CALCIUM 9 5 01/10/2018     Lab Results   Component Value Date    TROPONINI <0 02 03/09/2018     Lab Results   Component Value Date    WBC 7 00 03/09/2018    WBC 4 5 09/14/2017    HGB 15 7 03/09/2018    HGB 15 5 09/14/2017    HCT 46 1 03/09/2018    HCT 44 3 09/14/2017    MCV 91 03/09/2018    MCV 88 09/14/2017     03/09/2018     (L) 09/14/2017     Lab Results   Component Value Date    CHOL 188 01/10/2018    TRIG 186 (H) 01/10/2018    HDL 49 01/10/2018     Imaging: No results found  Review of Systems:  Review of Systems   All other systems reviewed and are negative  as described in my history of present illness        Physical Exam:  Physical Exam   Constitutional: He is oriented to person, place, and time  He appears well-developed and well-nourished  No distress  Not in any distress at the current time   HENT:   Head: Normocephalic and atraumatic  Right Ear: External ear normal    Left Ear: External ear normal    Nose: Nose normal    Mouth/Throat: Uvula is midline and mucous membranes are normal    Posterior pharynx is crowded   Eyes: Conjunctivae, EOM and lids are normal  Pupils are equal, round, and reactive to light  No scleral icterus  No pallor  No cyanosis  No icterus   Neck: Trachea normal and normal range of motion  No JVD present  Carotid bruit is not present   No thyromegaly present  No jugular lymphadenopathy   Cardiovascular: Normal rate, regular rhythm, S1 normal, S2 normal, normal heart sounds, intact distal pulses and normal pulses  PMI is not displaced  Exam reveals no gallop, no S3, no S4 and no friction rub  No murmur heard  Pulmonary/Chest: Effort normal and breath sounds normal  No accessory muscle usage  No respiratory distress  He has no decreased breath sounds  He has no wheezes  He has no rhonchi  He has no rales  He exhibits no tenderness  Abdominal: Soft  Normal appearance and bowel sounds are normal  He exhibits no distension and no mass  There is no splenomegaly or hepatomegaly  There is no tenderness  Musculoskeletal: Normal range of motion  He exhibits no edema, tenderness or deformity  Lymphadenopathy:     He has no cervical adenopathy  Neurological: He is alert and oriented to person, place, and time  Facial symmetry is retained  Extraocular movements are retained  Head neck tongue and palate movement are retained and symmetric   Skin: Skin is intact  No abrasion, no lesion and no rash noted  No erythema  Nails show no clubbing  Psychiatric: He has a normal mood and affect  His speech is normal and behavior is normal  Thought content normal        Discussion/Summary:    1   Paroxysmal atrial fibrillation    2 episodes so far  Currently on flecainide and none since being on it     We had a very detailed discussion as far as management of atrial fibrillation   my detailed recommendation for this patient are as follows         1 - natural history of disease   atrial fibrillation is a disease of age   prevalence is 1% in the 4th decade , 2-3% by age 72 and 12-13% by age 80   since it increases with age , definitive therapy is indicated         2 - sleep apnea   untreated sleep apnea is the common cause of failure of medication as well as ablation   success rate of paroxysmal atrial fibrillation ablation falls from 80% in the 1st year, to 15% in the 1st year, for untreated severe sleep apnea   the patient has a history of snoring, morning fatigue at times and daytime sleepiness at times  physical examination for short thick neck and crowded posterior pharynx is -positive  patient is recommended to follow up with Pulmonary and Sleep Medicine - for NO        3 - thyroid function   hyperthyroidism is a common precipitator of atrial fibrillation   Check TSH  TSH 3 8 on feb 15 ,2018        4 -Aggressive management of  hypertension   diabetes mellitus   heart failure         5 - anticoagulation   patient's chads Vasc score is - 1  hypertension         6 - rate control medication   beta-blocker - on same           7 - rhythm control medication      class 1 C agent - flecainide and propafenone   patient will need a stress study to rule out any underlying ischemia before these can be started   flecainide will necessitate use of an additional beta-blocker -   propafenone has intrinsic beta-blocker activity   On flecainide          class 3 agent - Sotalol and dofetilide   both will need hospital admission to monitor first 5 doses over the initial 2 and half days     sotalol has intrinsic beta-blocker properties   dofetilide may need an additional beta-blocker along with it for rate control  Keep potassium between 4 and 4 5   keep magnesium between 2 and 2 5   follow QTC   Follow creatinine          amiodarone   around this is very effective antiarrhythmic but has significantly long term toxicity   Hence certain basic studies are needed at baseline and thereafter at yearly intervals or   -hypo or hyperthyroidism , Check TSH    -can precipitate significant interstitial lung disease and hence DLCO at baseline and thereafter yearly   -long-term use causes cumulative toxicity in the liver- check  CMP   -corneal deposits advice and hence is ophthalmology evaluation         8 - ablation   this is the most effective method to achieve and maintain sinus rhythm      paroxysmal atrial fibrillation - 70-80% chance in the 1st year  and falls to 50% by 5 years   persistent atrial fibrillation - 50-60% chance in the 1st year and falls to 30% or less by 5 years      we use a combination of cryo and radiofrequency ablation      there is a 2-5% chance of serious complication which include - bleeding around access site, heart attack , stroke , bleeding around the heart requiring drainage , perforation requiring open heart surgery , phrenic nerve paralysis causing diaphragmatic paralysis, atrial esophageal fistula   we do deployed multiple methods to prevent such complication :  - heparin anticoagulation with ACT between 350 and 400s to prevent stroke   - coronary angiography if we are going to ablate close to any major blood vessel   -access to the pericardial drain if pericardial effusion were to happen   - pressure sensing catheters to reduce excessive pressure and chances of perforation   - esophageal temperature monitoring to reduce chances of injury           After a detailed discussion final recommendations are:  - check TSH  - get evaluated for sleep apnea  - dietary regimen to lose weight - 10% in next 6-12 months  - if flecainide fails, proceed to ablation

## 2018-05-18 NOTE — PROGRESS NOTES
Cardiology Consultation     Stepan Durant  [id number]  1975  [facility] [address]    1  NO (obstructive sleep apnea)     2   Atrial fibrillation, unspecified type [facility])  Ambulatory referral to Cardiac Electrophysiology    POCT ECG       History of present illness    The patient has been sent to me by Dr Ofelia Holcomb for management of atrial fibrillation    He is [employer]   He used to weigh 315 lb and currently weighs 280 lb    First episode was in the beginning of the year, went to hospital, terminated itself  Second episode was a few weeks later, went to hospital, was put on intravenous medication and then terminated  Patient was admitted at that time, underwent stress testing, was started on flecainide  He has been doing well since then  There has been no further episodes of irregular heart rate      The patient is not complaining of anginal like chest pain or chest pressure  There is no worsening orthopnea, paroxysmal nocturnal dyspnea  There is no leg swelling    Patient does get palpitation when it happens  There is no history of presyncope or syncope  There is rare history of transient  lightheadedness  When patient has these palpitations, it is associated with exertional intolerance      There is history of snoring at night  There is history of morning fatigability  There is occasional history of daytime sleepiness          Patient Active Problem List   Diagnosis    Benign essential hypertension    Fatty liver disease, nonalcoholic    Mixed hyperlipidemia    Obesity    Organic impotence    Palpitations    Atrial fibrillation ([address] )    NO (obstructive sleep apnea)     Past Medical History:   Diagnosis Date    Atrial fibrillation ([address] )     Benign essential hypertension     H/O nausea and vomiting     H/O vitamin D deficiency     Hyperlipidemia     Hypertension     Mixed hyperlipidemia      Social History     Social History    Marital status: Single     Spouse name: N/A    Number of children: N/A    Years of education: N/A     Occupational History    Not on file  Social History Main Topics    Smoking status: Never Smoker    Smokeless tobacco: Never Used    Alcohol use Yes      Comment: Occ    Drug use: No    Sexual activity: Not on file     Other Topics Concern    Not on file     Social History Narrative    No narrative on file      Family History   Problem Relation Age of Onset    Arthritis Mother     Hypertension Father     Prostate cancer Father     Cancer Family     Diabetes Family     Heart disease Family     Other Family      hyperlipoprotein     No past surgical history on file      Current Outpatient Prescriptions:     aspirin 81 mg chewable tablet, Chew 4 tablets (324 mg total) daily, Disp: 20 tablet, Rfl: 0    atorvastatin (LIPITOR) 20 mg tablet, Take by mouth daily  , Disp: , Rfl:     flecainide (TAMBOCOR) 100 mg tablet, Take 1 tablet (100 mg total) by mouth 2 (two) times a day, Disp: 180 tablet, Rfl: 1    Magnesium 500 MG CAPS, Take by mouth, Disp: , Rfl:     Multiple Vitamin (MULTI VITAMIN DAILY PO), Take 1 tablet by mouth daily, Disp: , Rfl:     nebivolol (BYSTOLIC) 10 mg tablet, Take 10 mg by mouth daily, Disp: , Rfl:     flecainide (TAMBOCOR) 50 mg tablet, 50 mg as needed If needed for palpitations , Disp: , Rfl:   No Known Allergies  Vitals:    05/18/18 1257   BP: 122/88   BP Location: Right arm   Cuff Size: Large   Pulse: (!) 48   Weight: 130 kg (286 lb)   Height: 6' 3" (1 905 m)       Labs:  Lab Results   Component Value Date     03/09/2018     01/10/2018    K 3 6 03/09/2018    K 4 2 01/10/2018     03/09/2018     01/10/2018    CO2 29 03/09/2018    CO2 25 01/10/2018    BUN 18 03/09/2018    BUN 25 (H) 01/10/2018    CREATININE 0 90 03/09/2018    CREATININE 0 88 01/10/2018    GLUCOSE 98 03/09/2018    GLUCOSE 94 01/10/2018    CALCIUM 10 2 (H) 03/09/2018    CALCIUM 9 5 01/10/2018     Lab Results   Component Value Date    TROPONINI <0 02 03/09/2018     Lab Results   Component Value Date    WBC 7 00 03/09/2018    WBC 4 5 09/14/2017    HGB 15 7 03/09/2018    HGB 15 5 09/14/2017    HCT 46 1 03/09/2018    HCT 44 3 09/14/2017    MCV 91 03/09/2018    MCV 88 09/14/2017     03/09/2018     (L) 09/14/2017     Lab Results   Component Value Date    CHOL 188 01/10/2018    TRIG 186 (H) 01/10/2018    HDL 49 01/10/2018     Imaging: No results found  Review of Systems:  Review of Systems   All other systems reviewed and are negative  as described in my history of present illness        Physical Exam:  Physical Exam   Constitutional: He is oriented to person, place, and time  He appears well-developed and well-nourished  No distress  Not in any distress at the current time   HENT:   Head: Normocephalic and atraumatic  Right Ear: External ear normal    Left Ear: External ear normal    Nose: Nose normal    Mouth/Throat: Uvula is midline and mucous membranes are normal    Posterior pharynx is crowded   Eyes: Conjunctivae, EOM and lids are normal  Pupils are equal, round, and reactive to light  No scleral icterus  No pallor  No cyanosis  No icterus   Neck: Trachea normal and normal range of motion  No JVD present  Carotid bruit is not present  No thyromegaly present  No jugular lymphadenopathy   Cardiovascular: Normal rate, regular rhythm, S1 normal, S2 normal, normal heart sounds, intact distal pulses and normal pulses  PMI is not displaced  Exam reveals no gallop, no S3, no S4 and no friction rub  No murmur heard  Pulmonary/Chest: Effort normal and breath sounds normal  No accessory muscle usage  No respiratory distress  He has no decreased breath sounds  He has no wheezes  He has no rhonchi  He has no rales  He exhibits no tenderness  Abdominal: Soft   Normal appearance and bowel sounds are normal  He exhibits no distension and no mass  There is no splenomegaly or hepatomegaly  There is no tenderness  Musculoskeletal: Normal range of motion  He exhibits no edema, tenderness or deformity  Lymphadenopathy:     He has no cervical adenopathy  Neurological: He is alert and oriented to person, place, and time  Facial symmetry is retained  Extraocular movements are retained  Head neck tongue and palate movement are retained and symmetric   Skin: Skin is intact  No abrasion, no lesion and no rash noted  No erythema  Nails show no clubbing  Psychiatric: He has a normal mood and affect  His speech is normal and behavior is normal  Thought content normal        Discussion/Summary:    1   Paroxysmal atrial fibrillation    2 episodes so far  Currently on flecainide and none since being on it     We had a very detailed discussion as far as management of atrial fibrillation   my detailed recommendation for this patient are as follows         1 - natural history of disease   atrial fibrillation is a disease of age   prevalence is 1% in the 4th decade , 2-3% by age 72 and 12-13% by age 80   since it increases with age , definitive therapy is indicated         2 - sleep apnea   untreated sleep apnea is the common cause of failure of medication as well as ablation   success rate of paroxysmal atrial fibrillation ablation falls from 80% in the 1st year, to 15% in the 1st year, for untreated severe sleep apnea   the patient has a history of snoring, morning fatigue at times and daytime sleepiness at times  physical examination for short thick neck and crowded posterior pharynx is -positive  patient is recommended to follow up with Pulmonary and Sleep Medicine - for NO        3 - thyroid function   hyperthyroidism is a common precipitator of atrial fibrillation   Check TSH  TSH 3 8 on feb 15 ,2018        4 -Aggressive management of  hypertension   diabetes mellitus   heart failure         5 - anticoagulation   patient's chads Vasc score is - 1  hypertension         6 - rate control medication   beta-blocker - on same           7 - rhythm control medication      class 1 C agent - flecainide and propafenone   patient will need a stress study to rule out any underlying ischemia before these can be started   flecainide will necessitate use of an additional beta-blocker -   propafenone has intrinsic beta-blocker activity   On flecainide          class 3 agent - Sotalol and dofetilide   both will need hospital admission to monitor first 5 doses over the initial 2 and half days     sotalol has intrinsic beta-blocker properties   dofetilide may need an additional beta-blocker along with it for rate control  Keep potassium between 4 and 4 5   keep magnesium between 2 and 2 5   follow QTC   Follow creatinine          amiodarone   around this is very effective antiarrhythmic but has significantly long term toxicity   Hence certain basic studies are needed at baseline and thereafter at yearly intervals or   -hypo or hyperthyroidism , Check TSH    -can precipitate significant interstitial lung disease and hence DLCO at baseline and thereafter yearly   -long-term use causes cumulative toxicity in the liver- check  CMP   -corneal deposits advice and hence is ophthalmology evaluation         8 - ablation   this is the most effective method to achieve and maintain sinus rhythm      paroxysmal atrial fibrillation - 70-80% chance in the 1st year  and falls to 50% by 5 years   persistent atrial fibrillation - 50-60% chance in the 1st year and falls to 30% or less by 5 years      we use a combination of cryo and radiofrequency ablation      there is a 2-5% chance of serious complication which include - bleeding around access site, heart attack , stroke , bleeding around the heart requiring drainage , perforation requiring open heart surgery , phrenic nerve paralysis causing diaphragmatic paralysis, atrial esophageal fistula   we do deployed multiple methods to prevent such complication :  - heparin anticoagulation with ACT between 350 and 400s to prevent stroke   - coronary angiography if we are going to ablate close to any major blood vessel   -access to the pericardial drain if pericardial effusion were to happen   - pressure sensing catheters to reduce excessive pressure and chances of perforation   - esophageal temperature monitoring to reduce chances of injury           After a detailed discussion final recommendations are:  - check TSH  - get evaluated for sleep apnea  - dietary regimen to lose weight - 10% in next 6-12 months  - if flecainide fails, proceed to ablation

## 2018-05-29 ENCOUNTER — TELEPHONE (OUTPATIENT)
Dept: CARDIOLOGY CLINIC | Facility: CLINIC | Age: 43
End: 2018-05-29

## 2018-05-29 NOTE — TELEPHONE ENCOUNTER
Sent message to cardiology clinical staff that patient has not scheduled consult with Dr Ariel Tyson

## 2018-05-29 NOTE — TELEPHONE ENCOUNTER
----- Message from Roel Rosales RN sent at 5/29/2018  7:30 AM EDT -----  Just wanted to let you know that he has not scheduled a consult with Dr Kranthi Wong  I spoke with the patient prior to his appointment with dr Coy Hedrick and left a message again on 5/21      [place]

## 2018-05-29 NOTE — TELEPHONE ENCOUNTER
I called Clarita Sterling and spoke with him about making an appt with Dr Papo Cline  He said after seeing Dr Bridgett Solorio, he plans to try to lose weight before doing anything else with slepp medicine

## 2018-06-28 DIAGNOSIS — I48.91 ATRIAL FIBRILLATION, UNSPECIFIED TYPE (HCC): ICD-10-CM

## 2018-06-28 RX ORDER — FLECAINIDE ACETATE 100 MG/1
100 TABLET ORAL 2 TIMES DAILY
Qty: 180 TABLET | Refills: 11 | Status: SHIPPED | OUTPATIENT
Start: 2018-06-28 | End: 2018-08-21 | Stop reason: SDUPTHER

## 2018-08-09 ENCOUNTER — OFFICE VISIT (OUTPATIENT)
Dept: CARDIOLOGY CLINIC | Facility: CLINIC | Age: 43
End: 2018-08-09
Payer: COMMERCIAL

## 2018-08-09 VITALS
OXYGEN SATURATION: 97 % | DIASTOLIC BLOOD PRESSURE: 80 MMHG | BODY MASS INDEX: 35.35 KG/M2 | HEART RATE: 43 BPM | WEIGHT: 282.8 LBS | SYSTOLIC BLOOD PRESSURE: 120 MMHG

## 2018-08-09 DIAGNOSIS — E78.2 ELEVATED TRIGLYCERIDES WITH HIGH CHOLESTEROL: ICD-10-CM

## 2018-08-09 DIAGNOSIS — I48.91 ATRIAL FIBRILLATION, UNSPECIFIED TYPE (HCC): Primary | ICD-10-CM

## 2018-08-09 PROCEDURE — 99214 OFFICE O/P EST MOD 30 MIN: CPT | Performed by: INTERNAL MEDICINE

## 2018-08-09 PROCEDURE — 93000 ELECTROCARDIOGRAM COMPLETE: CPT | Performed by: INTERNAL MEDICINE

## 2018-08-09 RX ORDER — CHLORAL HYDRATE 500 MG
1000 CAPSULE ORAL 2 TIMES DAILY
Qty: 60 CAPSULE | Refills: 0
Start: 2018-08-09

## 2018-08-09 NOTE — PROGRESS NOTES
Cardiology Follow Up  Cyndie Allan  1975  [id number]  [facility] [address] [address]    1  Atrial fibrillation, unspecified type ([address] )  POCT ECG      Discussion/Plan:  Paroxysmal afib- Followed by EP  Appreciate with recommendation  Mild sleep apnea  He is trying to lose weight  Has not had any breakthru- bystolic + flecainide  Continue bystolic + flecainide 478NS twice a day  He is taking aspirin 162mg twice a day with food   and [id number]    Mild sleep apnea- declines CPAP  Does not feel daytime fatigue  Wants to try weight loss + exercise  Interval History:  He was not taking flecainide consistently  Had second breakthru episode  No taking meds consistently  Denies having alcohol usage  Cut down his caffeine  08/09/2018:    He denies any for further breakthrough irregular heart rhythms  He has been consistent with his flecainide and Bystolic therapy  He denies any dizziness or lightheadedness  He denies having any significant chest discomfort  Reviewed his EKG and consultation notes        Patient Active Problem List   Diagnosis    Benign essential hypertension    Fatty liver disease, nonalcoholic    Mixed hyperlipidemia    Obesity    Organic impotence    Palpitations    Atrial fibrillation ([address] )     Past Medical History:   Diagnosis Date    Atrial fibrillation ([facility] [address]     Benign essential hypertension     BMI 38 0-38 9,adult 08/29/2016    Resolved:  September 22, 2017    Finger deformity, left 02/16/2016    Resolved:  September 22, 2017    Fracture of finger, left, closed 05/26/2016    Resolved:  September 22, 2017    Fracture of proximal phalanx of lesser toe of left foot 08/29/2016    Initial encounter:  Resolved:  September 22, 2017    H/O nausea and vomiting     H/O vitamin D deficiency     Hyperlipidemia     Hypertension     Mixed hyperlipidemia     Neoplasm of uncertain behavior of skin 08/02/2012    Resolved:  February 16, 2016    Obesity 02/16/2016    Resolved:  January 16, 2018    Overweight 02/2014    Resolved:  February 16, 2016    Sebaceous cyst 11/15/2010     Social History     Social History    Marital status: Single     Spouse name: N/A    Number of children: N/A    Years of education: N/A     Occupational History    Not on file  Social History Main Topics    Smoking status: Never Smoker    Smokeless tobacco: Never Used    Alcohol use Yes      Comment: Occ - Social drinker per [employer]    Drug use: No    Sexual activity: Not on file     Other Topics Concern    Not on file     Social History Narrative    No narrative on file      Family History   Problem Relation Age of Onset    Arthritis Mother     Hyperlipidemia Mother     Hypertension Father     Prostate cancer Father     Cancer Family     Diabetes Family     Heart disease Family     Other Family         hyperlipoprotein     Past Surgical History:   Procedure Laterality Date    WRIST SURGERY         Current Outpatient Prescriptions:     aspirin 81 mg chewable tablet, Chew 4 tablets (324 mg total) daily, Disp: 20 tablet, Rfl: 0    atorvastatin (LIPITOR) 20 mg tablet, Take by mouth daily  , Disp: , Rfl:     flecainide (TAMBOCOR) 100 mg tablet, Take 1 tablet (100 mg total) by mouth 2 (two) times a day, Disp: 180 tablet, Rfl: 11    flecainide (TAMBOCOR) 50 mg tablet, 50 mg as needed If needed for palpitations , Disp: , Rfl:     Magnesium 500 MG CAPS, Take by mouth, Disp: , Rfl:     Multiple Vitamin (MULTI VITAMIN DAILY PO), Take 1 tablet by mouth daily, Disp: , Rfl:     nebivolol (BYSTOLIC) 10 mg tablet, Take 10 mg by mouth daily, Disp: , Rfl:   No Known Allergies    Review of Systems:  Review of Systems   Constitutional: Negative  HENT: Negative  Eyes: Negative  Respiratory: Negative  Cardiovascular: Negative      Gastrointestinal: Negative  Endocrine: Negative  Genitourinary: Negative  Musculoskeletal: Negative  Skin: Negative  Allergic/Immunologic: Negative  Neurological: Negative  Hematological: Negative  Psychiatric/Behavioral: Negative  Vitals:    08/09/18 0810   BP: 120/80   BP Location: Right arm   Patient Position: Sitting   Cuff Size: Large   Pulse: (!) 43   SpO2: 97%   Weight: 128 kg (282 lb 12 8 oz)     Physical Exam:  Physical Exam   Constitutional: He is oriented to person, place, and time  No distress  HENT:   Head: Normocephalic and atraumatic  Right Ear: External ear normal    Left Ear: External ear normal    Eyes: Conjunctivae are normal  Pupils are equal, round, and reactive to light  Right eye exhibits no discharge  Left eye exhibits no discharge  No scleral icterus  Neck: Normal range of motion  Neck supple  No JVD present  No tracheal deviation present  No thyromegaly present  Cardiovascular: Normal rate and regular rhythm  Exam reveals no friction rub  No murmur heard  Pulmonary/Chest: Effort normal and breath sounds normal  No stridor  No respiratory distress  He has no wheezes  He has no rales  He exhibits no tenderness  Abdominal: Soft  Bowel sounds are normal  He exhibits no distension and no mass  There is no tenderness  There is no rebound and no guarding  Musculoskeletal: Normal range of motion  He exhibits no edema, tenderness or deformity  Neurological: He is alert and oriented to person, place, and time  He has normal reflexes  No cranial nerve deficit  He exhibits normal muscle tone  Coordination normal    Skin: Skin is warm and dry  No rash noted  He is not diaphoretic  No erythema  No pallor  Psychiatric: His behavior is normal  Judgment and thought content normal  His mood appears anxious  Nursing note and vitals reviewed        Labs:     Lab Results   Component Value Date    WBC 7 00 03/09/2018    HGB 15 7 03/09/2018    HCT 46 1 03/09/2018    MCV 91 2018     2018     Lab Results   Component Value Date     2018    K 3 6 2018     2018    CO2 29 2018    ANIONGAP 7 2018    BUN 18 2018    CREATININE 0 90 2018    GLUCOSE 98 2018    CALCIUM 10 2 (H) 2018    AST 23 2018    ALT 43 2018    ALKPHOS 71 2018    PROT 7 9 2018    BILITOT 1 00 2018    EGFR 105 2018     Lab Results   Component Value Date    CHOL 188 01/10/2018    CHOL 213 (H) 2017    CHOL 176 2016     Lab Results   Component Value Date    HDL 49 01/10/2018    HDL 46 2017    HDL 43 2016     Lab Results   Component Value Date    LDLCALC 102 (H) 01/10/2018    LDLCALC 136 (H) 2017    LDLCALC 102 (H) 2016     Lab Results   Component Value Date    TRIG 186 (H) 01/10/2018    TRIG 157 (H) 2017    TRIG 153 (H) 2016     No results found for: HGBA1C    Imaging & Testing   I have personally reviewed pertinent reports  Marked sinus bradycardia qtc 420    Cardiac testing:   Results for orders placed during the hospital encounter of 02/15/18   Echo complete with contrast if indicated    Narrative An 39  [address] [facility]  [address], [facility]  [phone]    Transthoracic Echocardiogram  2D, M-mode, Doppler, and Color Doppler    Study date:  15-Feb-2018    Patient: Agatha Lomas  MR number: [id number]  Account number: [de-identified]  : 1975  Age: 43 years  Gender: Male  Status: Routine  Location: Emergency room  Height: 75 in  Weight: 273 5 lb  BP: 06850/ 73 mmHg    Indications: A Fib , Abnormal heart sound    Diagnoses: I48 0 - Atrial fibrillation    Sonographer:  TIFFANIE Yu  Primary Physician:  Sabi Alfaro [place]:  [facility]  Interpreting Physician:  Perri Cooney MD    SUMMARY    LEFT VENTRICLE:  Systolic function was normal by Teichholz   Ejection fraction was estimated in the range of 50 % to 55 % to be 52 %  There were no regional wall motion abnormalities  Wall thickness was at the upper limits of normal     LEFT ATRIUM:  The atrium was mildly to moderately dilated  RIGHT ATRIUM:  The atrium was mildly dilated  HISTORY: PRIOR HISTORY: HTN, Hyperlipidemia    PROCEDURE: The procedure was performed in the emergency room  This was a routine study  The transthoracic approach was used  The study included complete 2D imaging, M-mode, complete spectral Doppler, and color Doppler  The heart rate was  51 bpm, at the start of the study  Images were obtained from the parasternal, apical, subcostal, and suprasternal notch acoustic windows  Image quality was adequate  LEFT VENTRICLE: Size was normal  Systolic function was normal by Teichholz  Ejection fraction was estimated in the range of 50 % to 55 % to be 52 %  There were no regional wall motion abnormalities  Wall thickness was at the upper limits  of normal  No evidence of apical thrombus  DOPPLER: Left ventricular diastolic function parameters were normal     RIGHT VENTRICLE: The size was normal  Systolic function was normal  Wall thickness was normal     LEFT ATRIUM: The atrium was mildly to moderately dilated  RIGHT ATRIUM: The atrium was mildly dilated  MITRAL VALVE: Valve structure was normal  There was normal leaflet separation  DOPPLER: The transmitral velocity was within the normal range  There was no evidence for stenosis  There was no significant regurgitation  AORTIC VALVE: The valve was trileaflet  Leaflets exhibited mildly increased thickness and normal cuspal separation  DOPPLER: Transaortic velocity was within the normal range  There was no evidence for stenosis  There was no significant  regurgitation  TRICUSPID VALVE: The valve structure was normal  There was normal leaflet separation  DOPPLER: The transtricuspid velocity was within the normal range  There was no evidence for stenosis   There was no significant regurgitation  PULMONIC VALVE: Leaflets exhibited normal thickness, no calcification, and normal cuspal separation  DOPPLER: The transpulmonic velocity was within the normal range  There was no significant regurgitation  PERICARDIUM: There was no pericardial effusion  The pericardium was normal in appearance  AORTA: The root exhibited normal size  SYSTEMIC VEINS: IVC: The inferior vena cava was normal in size  SYSTEM MEASUREMENT TABLES    2D mode  AoR Diam 2D: 3 4 cm  LA Diam (2D): 4 5 cm  LA/Ao (2D): 1 32  FS (2D Teich): 27 4 %  IVSd (2D): 0 97 cm  LVDEV: 146 cm³  LVEDV MOD BP: 180 cm³  LVESV: 68 8 cm³  LVIDd(2D): 5 47 cm  LVISd (2D): 3 97 cm  LVOT Area 2D: 3 14 cm squared  LVPWd (2D): 1 11 cm  SV (Teich): 77 2 cm³    Apical four chamber  LVEF A4C: 54 %    Apical two chamber  LA Area: 26 4 cm squared  LA Volume: 99 cm³  LVEF A2C: 52 %    Unspecified Scan Mode  NATALIE Cont Eq (Peak Bonifacio): 2 61 cm squared  LVOT Diam : 2 cm  LVOT Vmax: 1170 mm/s  LVOT Vmax; Mean: 1170 mm/s  Peak Grad ; Mean: 5 mm[Hg]  MV Peak A Bonifacio: 578 mm/s  MV Peak E Bonifacio  Mean: 884 mm/s  MVA (PHT): 4 31 cm squared  PHT: 51 ms  RA Area: 19 6 cm squared  RA Volume: 57 4 cm³  TAPSE: 2 3 cm    [facility]    Prepared and electronically signed by    Carlos Enrique Mcdowell MD  Signed 15-Feb-2018 10:12:16       No results found for this or any previous visit  Carlos Enrique Mcdowell MD [facility]  Please call with any questions or suggestions    A description of the counseling: ablation vs medical management  Goals and Barriers:  Patient's ability to self care:  Medication side effect reviewed with patient in detail and all their questions answered  "This note has been constructed using a voice recognition system  Therefore there may be syntax, spelling, and/or grammatical errors   Please call if you have any questions  "

## 2018-08-21 ENCOUNTER — TELEPHONE (OUTPATIENT)
Dept: CARDIOLOGY CLINIC | Facility: CLINIC | Age: 43
End: 2018-08-21

## 2018-08-21 ENCOUNTER — TELEPHONE (OUTPATIENT)
Dept: FAMILY MEDICINE CLINIC | Facility: CLINIC | Age: 43
End: 2018-08-21

## 2018-08-21 DIAGNOSIS — I48.0 PAROXYSMAL ATRIAL FIBRILLATION (HCC): Primary | ICD-10-CM

## 2018-08-21 DIAGNOSIS — I48.91 ATRIAL FIBRILLATION, UNSPECIFIED TYPE (HCC): ICD-10-CM

## 2018-08-21 DIAGNOSIS — E78.2 MIXED HYPERLIPIDEMIA: ICD-10-CM

## 2018-08-21 DIAGNOSIS — I10 BENIGN ESSENTIAL HYPERTENSION: Primary | ICD-10-CM

## 2018-08-21 RX ORDER — FLECAINIDE ACETATE 100 MG/1
100 TABLET ORAL 2 TIMES DAILY
Qty: 180 TABLET | Refills: 3 | Status: SHIPPED | OUTPATIENT
Start: 2018-08-21 | End: 2018-08-21 | Stop reason: SDUPTHER

## 2018-08-21 RX ORDER — ATORVASTATIN CALCIUM 20 MG/1
20 TABLET, FILM COATED ORAL DAILY
Qty: 90 TABLET | Refills: 1 | Status: SHIPPED | OUTPATIENT
Start: 2018-08-21 | End: 2019-02-24 | Stop reason: SDUPTHER

## 2018-08-21 RX ORDER — NEBIVOLOL 10 MG/1
10 TABLET ORAL DAILY
Qty: 90 TABLET | Refills: 1 | Status: SHIPPED | OUTPATIENT
Start: 2018-08-21 | End: 2019-02-24 | Stop reason: SDUPTHER

## 2018-08-21 RX ORDER — FLECAINIDE ACETATE 100 MG/1
100 TABLET ORAL 2 TIMES DAILY
Qty: 180 TABLET | Refills: 3 | Status: SHIPPED | OUTPATIENT
Start: 2018-08-21 | End: 2018-08-28 | Stop reason: SDUPTHER

## 2018-08-21 RX ORDER — FLECAINIDE ACETATE 50 MG/1
50 TABLET ORAL AS NEEDED
Qty: 90 TABLET | Refills: 3 | Status: SHIPPED | OUTPATIENT
Start: 2018-08-21 | End: 2018-08-28 | Stop reason: SDUPTHER

## 2018-08-21 NOTE — TELEPHONE ENCOUNTER
Spoke with Nourish and confirmed same with Dr Nancy Hair the additional script for Flecainide 50mg instructions is as: Take (1) 50 mg as needed for Palpitations

## 2018-08-21 NOTE — TELEPHONE ENCOUNTER
Received generated refill request for pt  [facility] is not in his chart, does he use this pharmacy?  Cleopatra Ghosh MA

## 2018-08-22 ENCOUNTER — TELEPHONE (OUTPATIENT)
Dept: CARDIOLOGY CLINIC | Facility: CLINIC | Age: 43
End: 2018-08-22

## 2018-08-28 ENCOUNTER — TELEPHONE (OUTPATIENT)
Dept: CARDIOLOGY CLINIC | Facility: CLINIC | Age: 43
End: 2018-08-28

## 2018-08-28 DIAGNOSIS — I48.91 ATRIAL FIBRILLATION, UNSPECIFIED TYPE (HCC): ICD-10-CM

## 2018-08-28 DIAGNOSIS — I48.0 PAROXYSMAL ATRIAL FIBRILLATION (HCC): ICD-10-CM

## 2018-08-28 RX ORDER — FLECAINIDE ACETATE 50 MG/1
50 TABLET ORAL AS NEEDED
Qty: 90 TABLET | Refills: 1 | Status: SHIPPED | OUTPATIENT
Start: 2018-08-28 | End: 2019-10-03 | Stop reason: ALTCHOICE

## 2018-08-28 RX ORDER — FLECAINIDE ACETATE 100 MG/1
100 TABLET ORAL 2 TIMES DAILY
Qty: 180 TABLET | Refills: 3 | Status: SHIPPED | OUTPATIENT
Start: 2018-08-28 | End: 2019-08-07 | Stop reason: SDUPTHER

## 2018-11-06 ENCOUNTER — OFFICE VISIT (OUTPATIENT)
Dept: FAMILY MEDICINE CLINIC | Facility: CLINIC | Age: 43
End: 2018-11-06
Payer: COMMERCIAL

## 2018-11-06 VITALS
WEIGHT: 292 LBS | RESPIRATION RATE: 16 BRPM | HEART RATE: 52 BPM | TEMPERATURE: 96.1 F | DIASTOLIC BLOOD PRESSURE: 96 MMHG | HEIGHT: 75 IN | BODY MASS INDEX: 36.31 KG/M2 | SYSTOLIC BLOOD PRESSURE: 136 MMHG

## 2018-11-06 DIAGNOSIS — E66.01 CLASS 2 SEVERE OBESITY DUE TO EXCESS CALORIES WITH SERIOUS COMORBIDITY AND BODY MASS INDEX (BMI) OF 36.0 TO 36.9 IN ADULT (HCC): ICD-10-CM

## 2018-11-06 DIAGNOSIS — R59.1 LYMPHADENOPATHY OF HEAD AND NECK: Primary | ICD-10-CM

## 2018-11-06 DIAGNOSIS — I10 BENIGN ESSENTIAL HYPERTENSION: ICD-10-CM

## 2018-11-06 DIAGNOSIS — I48.91 ATRIAL FIBRILLATION, UNSPECIFIED TYPE (HCC): ICD-10-CM

## 2018-11-06 PROCEDURE — 1036F TOBACCO NON-USER: CPT | Performed by: FAMILY MEDICINE

## 2018-11-06 PROCEDURE — 3008F BODY MASS INDEX DOCD: CPT | Performed by: FAMILY MEDICINE

## 2018-11-06 PROCEDURE — 99214 OFFICE O/P EST MOD 30 MIN: CPT | Performed by: FAMILY MEDICINE

## 2018-11-06 RX ORDER — AMOXICILLIN AND CLAVULANATE POTASSIUM 875; 125 MG/1; MG/1
1 TABLET, FILM COATED ORAL EVERY 12 HOURS SCHEDULED
Qty: 20 TABLET | Refills: 0 | Status: SHIPPED | OUTPATIENT
Start: 2018-11-06 | End: 2018-11-16

## 2018-11-06 NOTE — PATIENT INSTRUCTIONS
Obesity   AMBULATORY CARE:   Obesity  is when your body mass index (BMI) is greater than 30  Your healthcare provider will use your height and weight to measure your BMI  The risks of obesity include  many health problems, such as injuries or physical disability  You may need tests to check for the following:  · Diabetes     · High blood pressure or high cholesterol     · Heart disease     · Gallbladder or liver disease     · Cancer of the colon, breast, prostate, liver, or kidney     · Sleep apnea     · Arthritis or gout  Seek care immediately if:   · You have a severe headache, confusion, or difficulty speaking  · You have weakness on one side of your body  · You have chest pain, sweating, or shortness of breath  Contact your healthcare provider if:   · You have symptoms of gallbladder or liver disease, such as pain in your upper abdomen  · You have knee or hip pain and discomfort while walking  · You have symptoms of diabetes, such as intense hunger and thirst, and frequent urination  · You have symptoms of sleep apnea, such as snoring or daytime sleepiness  · You have questions or concerns about your condition or care  Treatment for obesity  focuses on helping you lose weight to improve your health  Even a small decrease in BMI can reduce the risk for many health problems  Your healthcare provider will help you set a weight-loss goal   · Lifestyle changes  are the first step in treating obesity  These include making healthy food choices and getting regular physical activity  Your healthcare provider may suggest a weight-loss program that involves coaching, education, and therapy  · Medicine  may help you lose weight when it is used with a healthy diet and physical activity  · Surgery  can help you lose weight if you are very obese and have other health problems  There are several types of weight-loss surgery  Ask your healthcare provider for more information    Be successful losing weight:   · Set small, realistic goals  An example of a small goal is to walk for 20 minutes 5 days a week  Anther goal is to lose 5% of your body weight  · Tell friends, family members, and coworkers about your goals  and ask for their support  Ask a friend to lose weight with you, or join a weight-loss support group  · Identify foods or triggers that may cause you to overeat , and find ways to avoid them  Remove tempting high-calorie foods from your home and workplace  Place a bowl of fresh fruit on your kitchen counter  If stress causes you to eat, then find other ways to cope with stress  · Keep a diary to track what you eat and drink  Also write down how many minutes of physical activity you do each day  Weigh yourself once a week and record it in your diary  Eating changes: You will need to eat 500 to 1,000 fewer calories each day than you currently eat to lose 1 to 2 pounds a week  The following changes will help you cut calories:  · Eat smaller portions  Use small plates, no larger than 9 inches in diameter  Fill your plate half full of fruits and vegetables  Measure your food using measuring cups until you know what a serving size looks like  · Eat 3 meals and 1 or 2 snacks each day  Plan your meals in advance  Eduardo Masters and eat at home most of the time  Eat slowly  · Eat fruits and vegetables at every meal   They are low in calories and high in fiber, which makes you feel full  Do not add butter, margarine, or cream sauce to vegetables  Use herbs to season steamed vegetables  · Eat less fat and fewer fried foods  Eat more baked or grilled chicken and fish  These protein sources are lower in calories and fat than red meat  Limit fast food  Dress your salads with olive oil and vinegar instead of bottled dressing  · Limit the amount of sugar you eat  Do not drink sugary beverages  Limit alcohol  Activity changes:  Physical activity is good for your body in many ways   It helps you burn calories and build strong muscles  It decreases stress and depression, and improves your mood  It can also help you sleep better  Talk to your healthcare provider before you begin an exercise program   · Exercise for at least 30 minutes 5 days a week  Start slowly  Set aside time each day for physical activity that you enjoy and that is convenient for you  It is best to do both weight training and an activity that increases your heart rate, such as walking, bicycling, or swimming  · Find ways to be more active  Do yard work and housecleaning  Walk up the stairs instead of using elevators  Spend your leisure time going to events that require walking, such as outdoor festivals or fairs  This extra physical activity can help you lose weight and keep it off  Follow up with your healthcare provider as directed: You may need to meet with a dietitian  Write down your questions so you remember to ask them during your visits  © 2017 [address] [employer] Information is for End User's use only and may not be sold, redistributed or otherwise used for commercial purposes  All illustrations and images included in CareNotes® are the copyrighted property of [employer]  or Clint Valenzuela  The above information is an  only  It is not intended as medical advice for individual conditions or treatments  Talk to your doctor, nurse or pharmacist before following any medical regimen to see if it is safe and effective for you

## 2018-11-06 NOTE — PROGRESS NOTES
Assessment/Plan:    Problem List Items Addressed This Visit     Benign essential hypertension     bp is elevated but he is very concerned about the mass in neck         Obesity    Relevant Medications    Naltrexone-Bupropion HCl ER (CONTRAVE) 8-90 MG TB12    Atrial fibrillation (HCC)     Would not consider adipex for the weight loss given the afib         BMI 36 0-36 9,adult    Relevant Medications    Naltrexone-Bupropion HCl ER (CONTRAVE) 8-90 MG TB12      Other Visit Diagnoses     Lymphadenopathy of head and neck    -  Primary    Relevant Medications    amoxicillin-clavulanate (AUGMENTIN) 875-125 mg per tablet    Other Relevant Orders    US head neck soft tissue          Patient Instructions   Obesity   AMBULATORY CARE:   Obesity  is when your body mass index (BMI) is greater than 30  Your healthcare provider will use your height and weight to measure your BMI  The risks of obesity include  many health problems, such as injuries or physical disability  You may need tests to check for the following:  · Diabetes     · High blood pressure or high cholesterol     · Heart disease     · Gallbladder or liver disease     · Cancer of the colon, breast, prostate, liver, or kidney     · Sleep apnea     · Arthritis or gout  Seek care immediately if:   · You have a severe headache, confusion, or difficulty speaking  · You have weakness on one side of your body  · You have chest pain, sweating, or shortness of breath  Contact your healthcare provider if:   · You have symptoms of gallbladder or liver disease, such as pain in your upper abdomen  · You have knee or hip pain and discomfort while walking  · You have symptoms of diabetes, such as intense hunger and thirst, and frequent urination  · You have symptoms of sleep apnea, such as snoring or daytime sleepiness  · You have questions or concerns about your condition or care    Treatment for obesity  focuses on helping you lose weight to improve your health  Even a small decrease in BMI can reduce the risk for many health problems  Your healthcare provider will help you set a weight-loss goal   · Lifestyle changes  are the first step in treating obesity  These include making healthy food choices and getting regular physical activity  Your healthcare provider may suggest a weight-loss program that involves coaching, education, and therapy  · Medicine  may help you lose weight when it is used with a healthy diet and physical activity  · Surgery  can help you lose weight if you are very obese and have other health problems  There are several types of weight-loss surgery  Ask your healthcare provider for more information  Be successful losing weight:   · Set small, realistic goals  An example of a small goal is to walk for 20 minutes 5 days a week  Anther goal is to lose 5% of your body weight  · Tell friends, family members, and coworkers about your goals  and ask for their support  Ask a friend to lose weight with you, or join a weight-loss support group  · Identify foods or triggers that may cause you to overeat , and find ways to avoid them  Remove tempting high-calorie foods from your home and workplace  Place a bowl of fresh fruit on your kitchen counter  If stress causes you to eat, then find other ways to cope with stress  · Keep a diary to track what you eat and drink  Also write down how many minutes of physical activity you do each day  Weigh yourself once a week and record it in your diary  Eating changes: You will need to eat 500 to 1,000 fewer calories each day than you currently eat to lose 1 to 2 pounds a week  The following changes will help you cut calories:  · Eat smaller portions  Use small plates, no larger than 9 inches in diameter  Fill your plate half full of fruits and vegetables  Measure your food using measuring cups until you know what a serving size looks like  · Eat 3 meals and 1 or 2 snacks each day    Plan your meals in advance  Naeem Sandovalg and eat at home most of the time  Eat slowly  · Eat fruits and vegetables at every meal   They are low in calories and high in fiber, which makes you feel full  Do not add butter, margarine, or cream sauce to vegetables  Use herbs to season steamed vegetables  · Eat less fat and fewer fried foods  Eat more baked or grilled chicken and fish  These protein sources are lower in calories and fat than red meat  Limit fast food  Dress your salads with olive oil and vinegar instead of bottled dressing  · Limit the amount of sugar you eat  Do not drink sugary beverages  Limit alcohol  Activity changes:  Physical activity is good for your body in many ways  It helps you burn calories and build strong muscles  It decreases stress and depression, and improves your mood  It can also help you sleep better  Talk to your healthcare provider before you begin an exercise program   · Exercise for at least 30 minutes 5 days a week  Start slowly  Set aside time each day for physical activity that you enjoy and that is convenient for you  It is best to do both weight training and an activity that increases your heart rate, such as walking, bicycling, or swimming  · Find ways to be more active  Do yard work and housecleaning  Walk up the stairs instead of using elevators  Spend your leisure time going to events that require walking, such as outdoor festivals or fairs  This extra physical activity can help you lose weight and keep it off  Follow up with your healthcare provider as directed: You may need to meet with a dietitian  Write down your questions so you remember to ask them during your visits  © 2017 [facility] Information is for End User's use only and may not be sold, redistributed or otherwise used for commercial purposes  All illustrations and images included in CareNotes® are the copyrighted property of [employer]  or Clint Valenzuela    The above information is an  only  It is not intended as medical advice for individual conditions or treatments  Talk to your doctor, nurse or pharmacist before following any medical regimen to see if it is safe and effective for you  Return in about 4 weeks (around 12/4/2018) for Recheck  Subjective:      Patient ID: Yuliya Moise is a 37 y o  male  Chief Complaint   Patient presents with    swollen gland     neck area, pt complaining of headache, tender to the touch- wmcma       Pt states 4 days ago he noticed a gland in the back of his head rt side  States it was the size of a golf ball  Gave him a HA  Pt states she has a little congestion but nothing to write home about    Pt states while he is here he is concerned about his weight he would like to try anything for weight loss        The following portions of the patient's history were reviewed and updated as appropriate: allergies, current medications, past family history, past medical history, past social history, past surgical history and problem list     Review of Systems   Constitutional: Negative for activity change, appetite change, chills, diaphoresis, fatigue, fever and unexpected weight change  HENT: Negative for congestion, dental problem, ear pain, mouth sores, sinus pain, sinus pressure, sore throat and trouble swallowing  Eyes: Negative for photophobia, discharge and itching  Respiratory: Negative for apnea, chest tightness and shortness of breath  Cardiovascular: Negative for chest pain, palpitations and leg swelling  Gastrointestinal: Negative for abdominal distention, abdominal pain, blood in stool, nausea and vomiting  Endocrine: Negative for cold intolerance, heat intolerance, polydipsia, polyphagia and polyuria  Genitourinary: Negative for difficulty urinating  Musculoskeletal: Negative for arthralgias  Skin: Negative for color change and wound          Mass in back of neck   Neurological: Negative for dizziness, syncope, speech difficulty and headaches  Hematological: Negative for adenopathy  Psychiatric/Behavioral: Negative for agitation and behavioral problems  Current Outpatient Prescriptions   Medication Sig Dispense Refill    aspirin 81 mg chewable tablet Chew 4 tablets (324 mg total) daily 20 tablet 0    atorvastatin (LIPITOR) 20 mg tablet Take 1 tablet (20 mg total) by mouth daily 90 tablet 1    flecainide (TAMBOCOR) 100 mg tablet Take 1 tablet (100 mg total) by mouth 2 (two) times a day 180 tablet 3    flecainide (TAMBOCOR) 50 mg tablet Take 1 tablet (50 mg total) by mouth as needed (as needed) If needed for palpitations 90 tablet 1    Magnesium 500 MG CAPS Take by mouth      Multiple Vitamin (MULTI VITAMIN DAILY PO) Take 1 tablet by mouth daily      nebivolol (BYSTOLIC) 10 mg tablet Take 1 tablet (10 mg total) by mouth daily 90 tablet 1    Omega-3 Fatty Acids (FISH OIL) 1,000 mg Take 1 capsule (1,000 mg total) by mouth 2 (two) times a day 60 capsule 0    amoxicillin-clavulanate (AUGMENTIN) 875-125 mg per tablet Take 1 tablet by mouth every 12 (twelve) hours for 10 days 20 tablet 0    Naltrexone-Bupropion HCl ER (CONTRAVE) 8-90 MG TB12 1 pill for 7 days in am, then 1pill BID for 7 days, then 2 pills in the am and one in PM for 7 days then 1 pill bid going forward 76 tablet 0     No current facility-administered medications for this visit  Objective:    /96   Pulse (!) 52   Temp (!) 96 1 °F (35 6 °C)   Resp 16   Ht 6' 3" (1 905 m)   Wt 132 kg (292 lb)   BMI 36 50 kg/m²        Physical Exam   Constitutional: He appears well-developed and well-nourished  No distress  HENT:   Head: Normocephalic and atraumatic  Right Ear: External ear normal    Left Ear: External ear normal    Nose: Nose normal    Mouth/Throat: Oropharynx is clear and moist  No oropharyngeal exudate  Eyes: Pupils are equal, round, and reactive to light   EOM are normal  Right eye exhibits no discharge  Left eye exhibits no discharge  No scleral icterus  Neck: No thyromegaly present  Cardiovascular: Normal rate and normal heart sounds  No murmur heard  Pulmonary/Chest: Effort normal and breath sounds normal  No respiratory distress  He has no wheezes  Abdominal: Soft  Bowel sounds are normal  He exhibits no distension and no mass  There is no tenderness  There is no rebound and no guarding  Musculoskeletal: Normal range of motion  Neurological: He is alert  He displays normal reflexes  Coordination normal    Skin: Skin is warm and dry  No rash noted  He is not diaphoretic  No erythema  Psychiatric: He has a normal mood and affect  His behavior is normal    Nursing note and vitals reviewed               Alejandro Becerra DO

## 2018-11-12 ENCOUNTER — HOSPITAL ENCOUNTER (OUTPATIENT)
Dept: RADIOLOGY | Facility: HOSPITAL | Age: 43
Discharge: HOME | End: 2018-11-12
Attending: FAMILY MEDICINE
Payer: COMMERCIAL

## 2018-11-12 ENCOUNTER — TELEPHONE (OUTPATIENT)
Dept: FAMILY MEDICINE CLINIC | Facility: CLINIC | Age: 43
End: 2018-11-12

## 2018-11-12 DIAGNOSIS — R59.1 LYMPHADENOPATHY OF HEAD AND NECK: ICD-10-CM

## 2018-11-12 PROCEDURE — 76536 US EXAM OF HEAD AND NECK: CPT

## 2018-11-12 NOTE — TELEPHONE ENCOUNTER
Please call pt, looks like the areas in question on the neck are benign appearing reactive nodes - as seen on US  This is great news and should not be a problem    If they change in that they get bigger etc, please have pt call us  Thank You

## 2019-02-11 ENCOUNTER — TELEPHONE (OUTPATIENT)
Dept: FAMILY MEDICINE CLINIC | Facility: CLINIC | Age: 44
End: 2019-02-11

## 2019-02-11 NOTE — TELEPHONE ENCOUNTER
Patient aware of previous message  Patient states was seen here 4 or 5 years ago and was given anxiety medication  Do not see that as a dx or history, wants to be seen for family counseling and needs note from medical provider     Will call back to schedule if needed   Michael Whitlock LPN

## 2019-02-11 NOTE — TELEPHONE ENCOUNTER
I do not have anxiety or depression in the chart nor do I see any visists to this office addressing that so I would not be able to generate a letter to that affect  Not sure who he has been seeing for that?

## 2019-02-11 NOTE — TELEPHONE ENCOUNTER
Pt says he needs documentation that he has been diagnosed with anxiety/depression, this is related to him being seen for counseling   Call when ready

## 2019-02-11 NOTE — TELEPHONE ENCOUNTER
As per allscripts pt was on lexapro, citalopram and viibryd [id number]- did not see any other meds after that

## 2019-02-24 DIAGNOSIS — I10 BENIGN ESSENTIAL HYPERTENSION: ICD-10-CM

## 2019-02-24 DIAGNOSIS — E78.2 MIXED HYPERLIPIDEMIA: ICD-10-CM

## 2019-02-25 RX ORDER — NEBIVOLOL HYDROCHLORIDE 10 MG/1
TABLET ORAL
Qty: 90 TABLET | Refills: 1 | Status: SHIPPED | OUTPATIENT
Start: 2019-02-25 | End: 2019-08-06 | Stop reason: SDUPTHER

## 2019-02-25 RX ORDER — ATORVASTATIN CALCIUM 20 MG/1
TABLET, FILM COATED ORAL
Qty: 90 TABLET | Refills: 1 | Status: SHIPPED | OUTPATIENT
Start: 2019-02-25 | End: 2019-08-06 | Stop reason: SDUPTHER

## 2019-08-06 DIAGNOSIS — I10 BENIGN ESSENTIAL HYPERTENSION: ICD-10-CM

## 2019-08-06 DIAGNOSIS — E78.2 MIXED HYPERLIPIDEMIA: ICD-10-CM

## 2019-08-06 DIAGNOSIS — I48.91 ATRIAL FIBRILLATION, UNSPECIFIED TYPE (HCC): ICD-10-CM

## 2019-08-06 DIAGNOSIS — I48.0 PAROXYSMAL ATRIAL FIBRILLATION (HCC): ICD-10-CM

## 2019-08-06 PROBLEM — F41.9 ANXIETY: Status: ACTIVE | Noted: 2019-08-06

## 2019-08-06 PROBLEM — F33.0 MILD EPISODE OF RECURRENT MAJOR DEPRESSIVE DISORDER (HCC): Status: ACTIVE | Noted: 2019-08-06

## 2019-08-06 RX ORDER — FLECAINIDE ACETATE 100 MG/1
100 TABLET ORAL 2 TIMES DAILY
Qty: 180 TABLET | Refills: 3 | Status: CANCELLED | OUTPATIENT
Start: 2019-08-06

## 2019-08-06 RX ORDER — NEBIVOLOL 10 MG/1
10 TABLET ORAL DAILY
Qty: 90 TABLET | Refills: 0 | Status: SHIPPED | OUTPATIENT
Start: 2019-08-06 | End: 2019-09-18 | Stop reason: SDUPTHER

## 2019-08-06 RX ORDER — FLECAINIDE ACETATE 50 MG/1
50 TABLET ORAL AS NEEDED
Qty: 90 TABLET | Refills: 1 | Status: CANCELLED | OUTPATIENT
Start: 2019-08-06

## 2019-08-06 RX ORDER — ATORVASTATIN CALCIUM 20 MG/1
20 TABLET, FILM COATED ORAL DAILY
Qty: 90 TABLET | Refills: 0 | Status: SHIPPED | OUTPATIENT
Start: 2019-08-06 | End: 2019-09-18 | Stop reason: SDUPTHER

## 2019-08-06 NOTE — TELEPHONE ENCOUNTER
Received a fax from [facility] services for a refill on pts Atorvastatin, bystolic, and flecainide  I do not see this pharmacy on pts list  Can we call pt and see if he uses this pharmacy  Thank you   Reilly Cheung

## 2019-08-06 NOTE — TELEPHONE ENCOUNTER
Looks like pt is due with a follow up with us    Also looks like flecanide is written by cardio  I will order him labs to get prior to his appt

## 2019-08-07 ENCOUNTER — TELEPHONE (OUTPATIENT)
Dept: CARDIOLOGY CLINIC | Facility: CLINIC | Age: 44
End: 2019-08-07

## 2019-08-07 DIAGNOSIS — I48.91 ATRIAL FIBRILLATION, UNSPECIFIED TYPE (HCC): ICD-10-CM

## 2019-08-07 RX ORDER — FLECAINIDE ACETATE 100 MG/1
100 TABLET ORAL 2 TIMES DAILY
Qty: 180 TABLET | Refills: 0 | Status: SHIPPED | OUTPATIENT
Start: 2019-08-07 | End: 2019-10-29 | Stop reason: SDUPTHER

## 2019-08-07 NOTE — TELEPHONE ENCOUNTER
Send Rx to a NEW PHARM:  [facility] in [place]([facility])   flecainide (TAMBOCOR) 100 mg tablet  90-day supply with refills   Going on vacation and 8/8/ and will need medication

## 2019-09-18 DIAGNOSIS — E78.2 MIXED HYPERLIPIDEMIA: ICD-10-CM

## 2019-09-18 DIAGNOSIS — I10 BENIGN ESSENTIAL HYPERTENSION: ICD-10-CM

## 2019-09-18 RX ORDER — NEBIVOLOL 10 MG/1
10 TABLET ORAL DAILY
Qty: 90 TABLET | Refills: 0 | Status: SHIPPED | OUTPATIENT
Start: 2019-09-18 | End: 2019-12-15 | Stop reason: SDUPTHER

## 2019-09-18 RX ORDER — ATORVASTATIN CALCIUM 20 MG/1
20 TABLET, FILM COATED ORAL DAILY
Qty: 90 TABLET | Refills: 0 | Status: SHIPPED | OUTPATIENT
Start: 2019-09-18 | End: 2019-12-15 | Stop reason: SDUPTHER

## 2019-10-03 ENCOUNTER — OFFICE VISIT (OUTPATIENT)
Dept: CARDIOLOGY CLINIC | Facility: CLINIC | Age: 44
End: 2019-10-03
Payer: COMMERCIAL

## 2019-10-03 VITALS
BODY MASS INDEX: 36.18 KG/M2 | SYSTOLIC BLOOD PRESSURE: 140 MMHG | OXYGEN SATURATION: 99 % | WEIGHT: 291 LBS | HEART RATE: 43 BPM | DIASTOLIC BLOOD PRESSURE: 80 MMHG | HEIGHT: 75 IN

## 2019-10-03 DIAGNOSIS — K76.0 FATTY LIVER DISEASE, NONALCOHOLIC: ICD-10-CM

## 2019-10-03 DIAGNOSIS — E78.2 ELEVATED TRIGLYCERIDES WITH HIGH CHOLESTEROL: ICD-10-CM

## 2019-10-03 DIAGNOSIS — I48.91 ATRIAL FIBRILLATION, UNSPECIFIED TYPE (HCC): Primary | ICD-10-CM

## 2019-10-03 PROCEDURE — 99215 OFFICE O/P EST HI 40 MIN: CPT | Performed by: INTERNAL MEDICINE

## 2019-10-03 PROCEDURE — 93000 ELECTROCARDIOGRAM COMPLETE: CPT | Performed by: INTERNAL MEDICINE

## 2019-10-03 NOTE — PROGRESS NOTES
Cardiology Follow Up  Valerie Latif  1975  [id number]  [facility] Brita Litten  [address] [place] [id number]    1  Atrial fibrillation, unspecified type ([address] )  POCT ECG      Discussion/Plan:  Paroxysmal afib- Followed by EP  Appreciate with recommendation  Mild sleep apnea  He is trying to lose weight  Has not had any breakthru- bystolic + flecainide  Continue bystolic + flecainide 004DV twice a day  He is taking aspirin 162mg twice a day with food   and [id number]  He is instructed to check his kidney function at least once a year for his creatinine clearance as is flecainide is cleared and does by his kidneys  He will follow up with his primary for his wellness exam and for kidney function check    Mild sleep apnea- declines CPAP  Does not feel daytime fatigue  Wants to try weight loss + exercise  Elevated triglycerides- cutting carbohydrates  Continue with his exercise  Omega 3 supplementation  Recheck lipid panel  ALLEN/elevated liver enzymes- check liver enzymes  Family Hx MI- lipid panel/hscrp    Health maintenance- pending wellness exam    Interval History:  He was not taking flecainide consistently  Had second breakthru episode  No taking meds consistently  Denies having alcohol usage  Cut down his caffeine  08/09/2018:    He denies any for further breakthrough irregular heart rhythms  He has been consistent with his flecainide and Bystolic therapy  He denies any dizziness or lightheadedness  He denies having any significant chest discomfort  Reviewed his EKG and consultation notes  10/03/2019:  He has not had significant recurrence of atrial fibrillation  He is exercising without limitations  We discussed about continuing medical therapy versus ablation  He has had behavior modification  Denies significant alcohol use  He has cut down his caffeine    He believes his sleep is better  He is changes work life balance      Patient Active Problem List   Diagnosis    Benign essential hypertension    Fatty liver disease, nonalcoholic    Mixed hyperlipidemia    Obesity    Organic impotence    Palpitations    Atrial fibrillation (HCC)    BMI 36 0-36 9,adult    Mild episode of recurrent major depressive disorder ([facility] )    Anxiety     Past Medical History:   Diagnosis Date    [facility]     Benign essential hypertension     BMI 38 0-38 9,adult 08/29/2016    Resolved:  September 22, 2017    Finger deformity, left 02/16/2016    Resolved:  September 22, 2017    Fracture of finger, left, closed 05/26/2016    Resolved:  September 22, 2017    Fracture of proximal phalanx of lesser toe of left foot 08/29/2016    Initial encounter:  Resolved:  September 22, 2017    H/O nausea and vomiting     H/O vitamin D deficiency     Hyperlipidemia     Hypertension     Mixed hyperlipidemia     Neoplasm of uncertain behavior of skin 08/02/2012    Resolved:  February 16, 2016    Obesity 02/16/2016    Resolved:  January 16, 2018    Overweight 02/2014    Resolved:  February 16, 2016    Sebaceous cyst 11/15/2010     Social History     Socioeconomic History    Marital status: Single     Spouse name: Not on file    Number of children: Not on file    Years of education: Not on file    Highest education level: Not on file   Occupational History    Not on file   Social Needs    Financial resource strain: Not on file    Food insecurity:     Worry: Not on file     Inability: Not on file    Transportation needs:     Medical: Not on file     Non-medical: Not on file   Tobacco Use    Smoking status: Never Smoker    Smokeless tobacco: Never Used   Substance and Sexual Activity    Alcohol use: Yes     Comment: Occ - Social drinker per [employer]    Drug use: No    Sexual activity: Not on file   Lifestyle    Physical activity:     Days per week: Not on file     Minutes per session: Not on file    Stress: Not on file   Relationships    Social connections:     Talks on phone: Not on file     Gets together: Not on file     Attends Jew service: Not on file     Active member of club or organization: Not on file     Attends meetings of clubs or organizations: Not on file     Relationship status: Not on file    Intimate partner violence:     Fear of current or ex partner: Not on file     Emotionally abused: Not on file     Physically abused: Not on file     Forced sexual activity: Not on file   Other Topics Concern    Not on file   Social History Narrative    Not on file      Family History   Problem Relation Age of Onset    Arthritis Mother     Hyperlipidemia Mother     Hypertension Father     Prostate cancer Father     Cancer Family     Diabetes Family     Heart disease Family     Other Family         hyperlipoprotein     Past Surgical History:   Procedure Laterality Date    WRIST SURGERY         Current Outpatient Medications:     atorvastatin (LIPITOR) 20 mg tablet, Take 1 tablet (20 mg total) by mouth daily, Disp: 90 tablet, Rfl: 0    flecainide (TAMBOCOR) 100 mg tablet, Take 1 tablet (100 mg total) by mouth 2 (two) times a day, Disp: 180 tablet, Rfl: 0    Magnesium 500 MG CAPS, Take by mouth, Disp: , Rfl:     Multiple Vitamin (MULTI VITAMIN DAILY PO), Take 1 tablet by mouth daily, Disp: , Rfl:     nebivolol (BYSTOLIC) 10 mg tablet, Take 1 tablet (10 mg total) by mouth daily, Disp: 90 tablet, Rfl: 0    Omega-3 Fatty Acids (FISH OIL) 1,000 mg, Take 1 capsule (1,000 mg total) by mouth 2 (two) times a day, Disp: 60 capsule, Rfl: 0    aspirin 81 mg chewable tablet, Chew 4 tablets (324 mg total) daily (Patient not taking: Reported on 10/3/2019), Disp: 20 tablet, Rfl: 0    Naltrexone-Bupropion HCl ER (CONTRAVE) 8-90 MG TB12, 1 pill for 7 days in am, then 1pill BID for 7 days, then 2 pills in the am and one in PM for 7 days then 1 pill bid going forward (Patient not taking: Reported on 10/3/2019), Disp: 76 tablet, Rfl: 0  No Known Allergies    Review of Systems:  Review of Systems   Constitutional: Negative  HENT: Negative  Eyes: Negative  Respiratory: Negative  Cardiovascular: Negative  Gastrointestinal: Negative  Endocrine: Negative  Genitourinary: Negative  Musculoskeletal: Negative  Skin: Negative  Allergic/Immunologic: Negative  Neurological: Negative  Hematological: Negative  Psychiatric/Behavioral: Negative  Vitals:    10/03/19 1635   BP: 140/80   BP Location: Right arm   Patient Position: Sitting   Cuff Size: Standard   Pulse: (!) 43   SpO2: 99%   Weight: 132 kg (291 lb)   Height: 6' 3" (1 905 m)     Physical Exam:  Physical Exam   Constitutional: He is oriented to person, place, and time  No distress  HENT:   Head: Normocephalic and atraumatic  Right Ear: External ear normal    Left Ear: External ear normal    Eyes: Pupils are equal, round, and reactive to light  Conjunctivae are normal  Right eye exhibits no discharge  Left eye exhibits no discharge  No scleral icterus  Neck: Normal range of motion  Neck supple  No JVD present  No tracheal deviation present  No thyromegaly present  Cardiovascular: Normal rate and regular rhythm  Exam reveals no friction rub  No murmur heard  Pulmonary/Chest: Effort normal and breath sounds normal  No stridor  No respiratory distress  He has no wheezes  He has no rales  He exhibits no tenderness  Abdominal: Soft  Bowel sounds are normal  He exhibits no distension and no mass  There is no tenderness  There is no rebound and no guarding  Musculoskeletal: Normal range of motion  He exhibits no edema, tenderness or deformity  Neurological: He is alert and oriented to person, place, and time  He has normal reflexes  No cranial nerve deficit  He exhibits normal muscle tone  Coordination normal    Skin: Skin is warm and dry  No rash noted  He is not diaphoretic  No erythema  No pallor  Psychiatric: His behavior is normal  Judgment and thought content normal  His mood appears anxious  Nursing note and vitals reviewed  Labs:     Lab Results   Component Value Date    WBC 7 00 2018    HGB 15 7 2018    HCT 46 1 2018    MCV 91 2018     2018     Lab Results   Component Value Date     01/10/2018    K 3 6 2018     2018    CO2 29 2018    BUN 18 2018    CREATININE 0 90 2018    GLUCOSE 94 01/10/2018    CALCIUM 10 2 (H) 2018    AST 23 2018    ALT 43 2018    ALKPHOS 71 2018    PROT 6 9 01/10/2018    BILITOT 1 1 01/10/2018    EGFR 105 2018     Lab Results   Component Value Date    CHOL 188 01/10/2018    CHOL 213 (H) 2017    CHOL 176 2016     Lab Results   Component Value Date    HDL 49 01/10/2018    HDL 46 2017    HDL 43 2016     Lab Results   Component Value Date    LDLCALC 102 (H) 01/10/2018    LDLCALC 136 (H) 2017    LDLCALC 102 (H) 2016     Lab Results   Component Value Date    TRIG 186 (H) 01/10/2018    TRIG 157 (H) 2017    TRIG 153 (H) 2016     No results found for: HGBA1C    Imaging & Testing   I have personally reviewed pertinent reports  Marked sinus bradycardia qtc 420    Cardiac testing:   Results for orders placed during the hospital encounter of 02/15/18   Echo complete with contrast if indicated    Narrative An [address]  [address] [facility] [phone]    Transthoracic Echocardiogram  2D, M-mode, Doppler, and Color Doppler    Study date:  15-Feb-2018    Patient: Deena Murillo  MR number: [id number]  Account number: [de-identified]  : 1975  Age: 43 years  Gender: Male  Status: Routine  Location: Emergency room  Height: 75 in  Weight: 273 5 lb  BP: 68572/ 73 mmHg    Indications:  A Fib , Abnormal heart sound    Diagnoses: I48 0 - Atrial fibrillation    Sonographer: TIFFANIE Nash  Primary Physician:  Syd Lunsford:  [facility]  Interpreting Physician:  Yvonne Fontaine MD    SUMMARY    LEFT VENTRICLE:  Systolic function was normal by Teichholz  Ejection fraction was estimated in the range of 50 % to 55 % to be 52 %  There were no regional wall motion abnormalities  Wall thickness was at the upper limits of normal     LEFT ATRIUM:  The atrium was mildly to moderately dilated  RIGHT ATRIUM:  The atrium was mildly dilated  HISTORY: PRIOR HISTORY: HTN, Hyperlipidemia    PROCEDURE: The procedure was performed in the emergency room  This was a routine study  The transthoracic approach was used  The study included complete 2D imaging, M-mode, complete spectral Doppler, and color Doppler  The heart rate was  51 bpm, at the start of the study  Images were obtained from the parasternal, apical, subcostal, and suprasternal notch acoustic windows  Image quality was adequate  LEFT VENTRICLE: Size was normal  Systolic function was normal by Teichholz  Ejection fraction was estimated in the range of 50 % to 55 % to be 52 %  There were no regional wall motion abnormalities  Wall thickness was at the upper limits  of normal  No evidence of apical thrombus  DOPPLER: Left ventricular diastolic function parameters were normal     RIGHT VENTRICLE: The size was normal  Systolic function was normal  Wall thickness was normal     LEFT ATRIUM: The atrium was mildly to moderately dilated  RIGHT ATRIUM: The atrium was mildly dilated  MITRAL VALVE: Valve structure was normal  There was normal leaflet separation  DOPPLER: The transmitral velocity was within the normal range  There was no evidence for stenosis  There was no significant regurgitation  AORTIC VALVE: The valve was trileaflet  Leaflets exhibited mildly increased thickness and normal cuspal separation  DOPPLER: Transaortic velocity was within the normal range   There was no evidence for stenosis  There was no significant  regurgitation  TRICUSPID VALVE: The valve structure was normal  There was normal leaflet separation  DOPPLER: The transtricuspid velocity was within the normal range  There was no evidence for stenosis  There was no significant regurgitation  PULMONIC VALVE: Leaflets exhibited normal thickness, no calcification, and normal cuspal separation  DOPPLER: The transpulmonic velocity was within the normal range  There was no significant regurgitation  PERICARDIUM: There was no pericardial effusion  The pericardium was normal in appearance  AORTA: The root exhibited normal size  SYSTEMIC VEINS: IVC: The inferior vena cava was normal in size  SYSTEM MEASUREMENT TABLES    2D mode  AoR Diam 2D: 3 4 cm  LA Diam (2D): 4 5 cm  LA/Ao (2D): 1 32  FS (2D Teich): 27 4 %  IVSd (2D): 0 97 cm  LVDEV: 146 cm³  LVEDV MOD BP: 180 cm³  LVESV: 68 8 cm³  LVIDd(2D): 5 47 cm  LVISd (2D): 3 97 cm  LVOT Area 2D: 3 14 cm squared  LVPWd (2D): 1 11 cm  SV (Teich): 77 2 cm³    Apical four chamber  LVEF A4C: 54 %    Apical two chamber  LA Area: 26 4 cm squared  LA Volume: 99 cm³  LVEF A2C: 52 %    Unspecified Scan Mode  NATALIE Cont Eq (Peak Bonifacio): 2 61 cm squared  LVOT Diam : 2 cm  LVOT Vmax: 1170 mm/s  LVOT Vmax; Mean: 1170 mm/s  Peak Grad ; Mean: 5 mm[Hg]  MV Peak A Bonifacio: 578 mm/s  MV Peak E Bonifacio  Mean: 884 mm/s  MVA (PHT): 4 31 cm squared  PHT: 51 ms  RA Area: 19 6 cm squared  RA Volume: 57 4 cm³  TAPSE: 2 3 cm    [employer] Accredited Echocardiography Laboratory    Prepared and electronically signed by    Natalia Mendez MD  Signed 15-Feb-2018 10:12:16       No results found for this or any previous visit        Natalia Tom  Please call with any questions or suggestions    A description of the counseling: ablation vs medical management  Goals and Barriers:  Patient's ability to self care:  Medication side effect reviewed with patient in detail and all their questions answered  "This note has been constructed using a voice recognition system  Therefore there may be syntax, spelling, and/or grammatical errors   Please call if you have any questions  "

## 2019-10-29 DIAGNOSIS — I48.91 ATRIAL FIBRILLATION, UNSPECIFIED TYPE (HCC): ICD-10-CM

## 2019-10-29 RX ORDER — FLECAINIDE ACETATE 100 MG/1
TABLET ORAL
Qty: 180 TABLET | Refills: 0 | Status: SHIPPED | OUTPATIENT
Start: 2019-10-29 | End: 2020-01-31 | Stop reason: SDUPTHER

## 2019-12-15 DIAGNOSIS — I10 BENIGN ESSENTIAL HYPERTENSION: ICD-10-CM

## 2019-12-15 DIAGNOSIS — E78.2 MIXED HYPERLIPIDEMIA: ICD-10-CM

## 2019-12-16 RX ORDER — ATORVASTATIN CALCIUM 20 MG/1
TABLET, FILM COATED ORAL
Qty: 90 TABLET | Refills: 0 | Status: SHIPPED | OUTPATIENT
Start: 2019-12-16 | End: 2020-02-13 | Stop reason: SDUPTHER

## 2019-12-16 RX ORDER — NEBIVOLOL HYDROCHLORIDE 10 MG/1
TABLET ORAL
Qty: 90 TABLET | Refills: 0 | Status: SHIPPED | OUTPATIENT
Start: 2019-12-16 | End: 2020-02-13 | Stop reason: SDUPTHER

## 2020-01-29 LAB
ALBUMIN SERPL-MCNC: 4.8 G/DL (ref 4–5)
ALBUMIN/GLOB SERPL: 2.5 {RATIO} (ref 1.2–2.2)
ALP SERPL-CCNC: 61 IU/L (ref 39–117)
ALT SERPL-CCNC: 25 IU/L (ref 0–44)
AST SERPL-CCNC: 23 IU/L (ref 0–40)
BASOPHILS # BLD AUTO: 0.1 X10E3/UL (ref 0–0.2)
BASOPHILS NFR BLD AUTO: 1 %
BILIRUB SERPL-MCNC: 0.9 MG/DL (ref 0–1.2)
BUN SERPL-MCNC: 18 MG/DL (ref 6–24)
BUN/CREAT SERPL: 19 (ref 9–20)
CALCIUM SERPL-MCNC: 10.3 MG/DL (ref 8.7–10.2)
CHLORIDE SERPL-SCNC: 106 MMOL/L (ref 96–106)
CHOLEST SERPL-MCNC: 144 MG/DL (ref 100–199)
CO2 SERPL-SCNC: 25 MMOL/L (ref 20–29)
CREAT SERPL-MCNC: 0.95 MG/DL (ref 0.76–1.27)
CRP SERPL HS-MCNC: 5.35 MG/L (ref 0–3)
EOSINOPHIL # BLD AUTO: 0.1 X10E3/UL (ref 0–0.4)
EOSINOPHIL NFR BLD AUTO: 2 %
ERYTHROCYTE [DISTWIDTH] IN BLOOD BY AUTOMATED COUNT: 12.9 % (ref 11.6–15.4)
GLOBULIN SER-MCNC: 1.9 G/DL (ref 1.5–4.5)
GLUCOSE SERPL-MCNC: 103 MG/DL (ref 65–99)
HCT VFR BLD AUTO: 43.1 % (ref 37.5–51)
HDLC SERPL-MCNC: 45 MG/DL
HGB BLD-MCNC: 14.8 G/DL (ref 13–17.7)
IMM GRANULOCYTES # BLD: 0 X10E3/UL (ref 0–0.1)
IMM GRANULOCYTES NFR BLD: 0 %
LDLC SERPL CALC-MCNC: 78 MG/DL (ref 0–99)
LYMPHOCYTES # BLD AUTO: 2 X10E3/UL (ref 0.7–3.1)
LYMPHOCYTES NFR BLD AUTO: 31 %
MCH RBC QN AUTO: 30.8 PG (ref 26.6–33)
MCHC RBC AUTO-ENTMCNC: 34.3 G/DL (ref 31.5–35.7)
MCV RBC AUTO: 90 FL (ref 79–97)
MONOCYTES # BLD AUTO: 0.5 X10E3/UL (ref 0.1–0.9)
MONOCYTES NFR BLD AUTO: 7 %
NEUTROPHILS # BLD AUTO: 3.9 X10E3/UL (ref 1.4–7)
NEUTROPHILS NFR BLD AUTO: 59 %
PLATELET # BLD AUTO: 173 X10E3/UL (ref 150–450)
POTASSIUM SERPL-SCNC: 4.7 MMOL/L (ref 3.5–5.2)
PROT SERPL-MCNC: 6.7 G/DL (ref 6–8.5)
RBC # BLD AUTO: 4.8 X10E6/UL (ref 4.14–5.8)
SL AMB EGFR AFRICAN AMERICAN: 112 ML/MIN/1.73
SL AMB EGFR NON AFRICAN AMERICAN: 97 ML/MIN/1.73
SODIUM SERPL-SCNC: 144 MMOL/L (ref 134–144)
TRIGL SERPL-MCNC: 105 MG/DL (ref 0–149)
TSH SERPL DL<=0.005 MIU/L-ACNC: 1.99 UIU/ML (ref 0.45–4.5)
WBC # BLD AUTO: 6.6 X10E3/UL (ref 3.4–10.8)

## 2020-01-31 DIAGNOSIS — I48.91 ATRIAL FIBRILLATION, UNSPECIFIED TYPE (HCC): ICD-10-CM

## 2020-01-31 RX ORDER — FLECAINIDE ACETATE 100 MG/1
100 TABLET ORAL 2 TIMES DAILY
Qty: 180 TABLET | Refills: 3 | Status: SHIPPED | OUTPATIENT
Start: 2020-01-31 | End: 2020-05-26 | Stop reason: SDUPTHER

## 2020-01-31 NOTE — TELEPHONE ENCOUNTER
Recent labs - wants to discuss CRP - 1st time having test done - elevated CRP - pt has been sick with a cold - had labs done at hospital - alarmed and would like a callback;   Needs flecainide refill

## 2020-02-12 ENCOUNTER — TELEPHONE (OUTPATIENT)
Dept: CARDIOLOGY CLINIC | Facility: CLINIC | Age: 45
End: 2020-02-12

## 2020-02-12 NOTE — TELEPHONE ENCOUNTER
Calling back about lab work that was supposed to be reordered  He would also like to speak with Dr Naima Liang if possible

## 2020-02-13 DIAGNOSIS — I10 BENIGN ESSENTIAL HYPERTENSION: ICD-10-CM

## 2020-02-13 DIAGNOSIS — E78.2 MIXED HYPERLIPIDEMIA: ICD-10-CM

## 2020-02-13 RX ORDER — ATORVASTATIN CALCIUM 20 MG/1
20 TABLET, FILM COATED ORAL DAILY
Qty: 90 TABLET | Refills: 0 | Status: SHIPPED | OUTPATIENT
Start: 2020-02-13 | End: 2020-06-20

## 2020-02-13 RX ORDER — NEBIVOLOL 10 MG/1
10 TABLET ORAL DAILY
Qty: 90 TABLET | Refills: 0 | Status: SHIPPED | OUTPATIENT
Start: 2020-02-13 | End: 2020-05-26 | Stop reason: SDUPTHER

## 2020-03-04 ENCOUNTER — OFFICE VISIT (OUTPATIENT)
Dept: FAMILY MEDICINE CLINIC | Facility: CLINIC | Age: 45
End: 2020-03-04
Payer: COMMERCIAL

## 2020-03-04 VITALS
HEART RATE: 44 BPM | SYSTOLIC BLOOD PRESSURE: 128 MMHG | HEIGHT: 75 IN | DIASTOLIC BLOOD PRESSURE: 78 MMHG | RESPIRATION RATE: 16 BRPM | TEMPERATURE: 96.7 F | WEIGHT: 285 LBS | BODY MASS INDEX: 35.43 KG/M2

## 2020-03-04 DIAGNOSIS — E66.01 SEVERE OBESITY (BMI 35.0-39.9) WITH COMORBIDITY (HCC): ICD-10-CM

## 2020-03-04 DIAGNOSIS — Z23 NEED FOR VACCINATION: ICD-10-CM

## 2020-03-04 DIAGNOSIS — E78.2 MIXED HYPERLIPIDEMIA: ICD-10-CM

## 2020-03-04 DIAGNOSIS — I10 BENIGN ESSENTIAL HYPERTENSION: ICD-10-CM

## 2020-03-04 DIAGNOSIS — M79.609 ARM PAIN, SUPERIOR, RIGHT: ICD-10-CM

## 2020-03-04 DIAGNOSIS — Z00.00 WELL ADULT EXAM: Primary | ICD-10-CM

## 2020-03-04 DIAGNOSIS — Z11.4 SCREENING FOR HIV (HUMAN IMMUNODEFICIENCY VIRUS): ICD-10-CM

## 2020-03-04 DIAGNOSIS — I48.91 ATRIAL FIBRILLATION, UNSPECIFIED TYPE (HCC): ICD-10-CM

## 2020-03-04 PROCEDURE — 90732 PPSV23 VACC 2 YRS+ SUBQ/IM: CPT

## 2020-03-04 PROCEDURE — 90471 IMMUNIZATION ADMIN: CPT

## 2020-03-04 PROCEDURE — 99396 PREV VISIT EST AGE 40-64: CPT | Performed by: FAMILY MEDICINE

## 2020-03-04 PROCEDURE — 90686 IIV4 VACC NO PRSV 0.5 ML IM: CPT

## 2020-03-04 PROCEDURE — 90472 IMMUNIZATION ADMIN EACH ADD: CPT

## 2020-03-04 RX ORDER — CYCLOBENZAPRINE HCL 10 MG
10 TABLET ORAL
Qty: 21 TABLET | Refills: 0 | Status: SHIPPED | OUTPATIENT
Start: 2020-03-04 | End: 2020-06-16 | Stop reason: ALTCHOICE

## 2020-03-04 NOTE — PATIENT INSTRUCTIONS
Obesity   AMBULATORY CARE:   Obesity  is when your body mass index (BMI) is greater than 30  Your healthcare provider will use your height and weight to measure your BMI  The risks of obesity include  many health problems, such as injuries or physical disability  You may need tests to check for the following:  · Diabetes     · High blood pressure or high cholesterol     · Heart disease     · Gallbladder or liver disease     · Cancer of the colon, breast, prostate, liver, or kidney     · Sleep apnea     · Arthritis or gout  Seek care immediately if:   · You have a severe headache, confusion, or difficulty speaking  · You have weakness on one side of your body  · You have chest pain, sweating, or shortness of breath  Contact your healthcare provider if:   · You have symptoms of gallbladder or liver disease, such as pain in your upper abdomen  · You have knee or hip pain and discomfort while walking  · You have symptoms of diabetes, such as intense hunger and thirst, and frequent urination  · You have symptoms of sleep apnea, such as snoring or daytime sleepiness  · You have questions or concerns about your condition or care  Treatment for obesity  focuses on helping you lose weight to improve your health  Even a small decrease in BMI can reduce the risk for many health problems  Your healthcare provider will help you set a weight-loss goal   · Lifestyle changes  are the first step in treating obesity  These include making healthy food choices and getting regular physical activity  Your healthcare provider may suggest a weight-loss program that involves coaching, education, and therapy  · Medicine  may help you lose weight when it is used with a healthy diet and physical activity  · Surgery  can help you lose weight if you are very obese and have other health problems  There are several types of weight-loss surgery  Ask your healthcare provider for more information    Be successful losing weight:   · Set small, realistic goals  An example of a small goal is to walk for 20 minutes 5 days a week  Anther goal is to lose 5% of your body weight  · Tell friends, family members, and coworkers about your goals  and ask for their support  Ask a friend to lose weight with you, or join a weight-loss support group  · Identify foods or triggers that may cause you to overeat , and find ways to avoid them  Remove tempting high-calorie foods from your home and workplace  Place a bowl of fresh fruit on your kitchen counter  If stress causes you to eat, then find other ways to cope with stress  · Keep a diary to track what you eat and drink  Also write down how many minutes of physical activity you do each day  Weigh yourself once a week and record it in your diary  Eating changes: You will need to eat 500 to 1,000 fewer calories each day than you currently eat to lose 1 to 2 pounds a week  The following changes will help you cut calories:  · Eat smaller portions  Use small plates, no larger than 9 inches in diameter  Fill your plate half full of fruits and vegetables  Measure your food using measuring cups until you know what a serving size looks like  · Eat 3 meals and 1 or 2 snacks each day  Plan your meals in advance  [employer] Longest and eat at home most of the time  Eat slowly  · Eat fruits and vegetables at every meal   They are low in calories and high in fiber, which makes you feel full  Do not add butter, margarine, or cream sauce to vegetables  Use herbs to season steamed vegetables  · Eat less fat and fewer fried foods  Eat more baked or grilled chicken and fish  These protein sources are lower in calories and fat than red meat  Limit fast food  Dress your salads with olive oil and vinegar instead of bottled dressing  · Limit the amount of sugar you eat  Do not drink sugary beverages  Limit alcohol  Activity changes:  Physical activity is good for your body in many ways   It helps you burn calories and build strong muscles  It decreases stress and depression, and improves your mood  It can also help you sleep better  Talk to your healthcare provider before you begin an exercise program   · Exercise for at least 30 minutes 5 days a week  Start slowly  Set aside time each day for physical activity that you enjoy and that is convenient for you  It is best to do both weight training and an activity that increases your heart rate, such as walking, bicycling, or swimming  · Find ways to be more active  Do yard work and housecleaning  Walk up the stairs instead of using elevators  Spend your leisure time going to events that require walking, such as outdoor festivals or fairs  This extra physical activity can help you lose weight and keep it off  Follow up with your healthcare provider as directed: You may need to meet with a dietitian  Write down your questions so you remember to ask them during your visits  © 2017 [address] [employer] Information is for End User's use only and may not be sold, redistributed or otherwise used for commercial purposes  All illustrations and images included in CareNotes® are the copyrighted property of [employer]  or Clint Valenzuela  The above information is an  only  It is not intended as medical advice for individual conditions or treatments  Talk to your doctor, nurse or pharmacist before following any medical regimen to see if it is safe and effective for you

## 2020-03-04 NOTE — PROGRESS NOTES
FAMILY PRACTICE HEALTH MAINTENANCE OFFICE VISIT  [facility] - [facility]    NAME: Delano Head  AGE: 40 y o  SEX: male  : 1975     DATE: 3/4/2020    Assessment and Plan     1  Well adult exam    2  Benign essential hypertension    3  Atrial fibrillation, unspecified type ([facility] )    4  Mixed hyperlipidemia  -     Comprehensive metabolic panel; Future; Expected date: 2020  -     Lipid Panel with Direct LDL reflex; Future; Expected date: 2020    5  Screening for HIV (human immunodeficiency virus)  -     [facility]- Human Immunodeficiency Virus 1/2 Antigen / Antibody ( Fourth Generation) with Reflex Testing; Future    6  Severe obesity (BMI 35 0-39  9) with comorbidity ([facility] )    7  BMI 35 0-35 9,adult    8  Need for vaccination  -     influenza vaccine, quadrivalent, 0 5 mL, preservative-free  -     PNEUMOCOCCAL POLYSACCHARIDE VACCINE 23-VALENT =>3YO SQ IM    9  Arm pain, superior, right  -     cyclobenzaprine (FLEXERIL) 10 mg tablet; Take 1 tablet (10 mg total) by mouth daily at bedtime for 21 days        · Patient Counseling:   · Nutrition: Stressed importance of a well balanced diet, moderation of sodium/saturated fat, caloric balance and sufficient intake of fiber  · Exercise: Stressed the importance of regular exercise with a goal of 150 minutes per week  · Dental Health: Discussed daily flossing and brushing and regular dental visits     · Immunizations reviewed: See Orders  · Discussed benefits of:  Screening labs   BMI Counseling: Body mass index is 35 62 kg/m²  Discussed with patient's BMI with him  The BMI is above normal  Nutrition recommendations include reducing portion sizes  Return in about 6 months (around 2020) for Recheck          Chief Complaint     Chief Complaint   Patient presents with    Physical Exam     prcma    right shoulder pain     for 1 week        History of Present Illness     Pt is shed for a full physical  PT states he had labs done by his cardio    Pt states while he is here two weeks ago he was at the gym doing lat pulldown, that evening he felt tightness in his shoulder and in the am he could not move  It has been a wek and a half and its still bothering him  He had a massage and saw a chiro  Does giove him some pain rad down his arm  It has eased off some      Well Adult Physical   Patient here for a comprehensive physical exam       Diet and Physical Activity  Diet: well balanced diet  Exercise: frequently      Depression Screen  PHQ-9 Depression Screening    PHQ-9:    Frequency of the following problems over the past two weeks:       Little interest or pleasure in doing things:  0 - not at all  Feeling down, depressed, or hopeless:  0 - not at all  Trouble falling or staying asleep, or sleeping too much:  1 - several days  Feeling tired or having little energy:  1 - several days  Poor appetite or overeatin - not at all  Feeling bad about yourself - or that you are a failure or have let yourself or your family down:  0 - not at all  Trouble concentrating on things, such as reading the newspaper or watching television:  0 - not at all  Moving or speaking so slowly that other people could have noticed   Or the opposite - being so fidgety or restless that you have been moving around a lot more than usual:  0 - not at all  Thoughts that you would be better off dead, or of hurting yourself in some way:  0 - not at all  PHQ-2 Score:  0  PHQ-9 Score:  2          General Health  Hearing: Normal:  bilateral  Vision: no vision problems  Dental: regular dental visits    Reproductive Health  No issues       The following portions of the patient's history were reviewed and updated as appropriate: allergies, current medications, past family history, past medical history, past social history, past surgical history and problem list     Review of Systems     Review of Systems    Past Medical History     Past Medical History: Diagnosis Date    Atrial fibrillation ([facility] )     Benign essential hypertension     BMI 38 0-38 9,adult 08/29/2016    Resolved:  September 22, 2017    Finger deformity, left 02/16/2016    Resolved:  September 22, 2017    Fracture of finger, left, closed 05/26/2016    Resolved:  September 22, 2017    Fracture of proximal phalanx of lesser toe of left foot 08/29/2016    Initial encounter:  Resolved:  September 22, 2017    H/O nausea and vomiting     H/O vitamin D deficiency     Hyperlipidemia     Hypertension     Mixed hyperlipidemia     Neoplasm of uncertain behavior of skin 08/02/2012    Resolved:  February 16, 2016    Obesity 02/16/2016    Resolved:  January 16, 2018    Overweight 02/2014    Resolved:  February 16, 2016    Sebaceous cyst 11/15/2010       Past Surgical History     Past Surgical History:   Procedure Laterality Date    WRIST SURGERY         Social History     Social History     Socioeconomic History    Marital status: Single     Spouse name: None    Number of children: None    Years of education: None    Highest education level: None   Occupational History    None   Social Needs    Financial resource strain: None    Food insecurity:     Worry: None     Inability: None    Transportation needs:     Medical: None     Non-medical: None   Tobacco Use    Smoking status: Never Smoker    Smokeless tobacco: Never Used   Substance and Sexual Activity    Alcohol use: Yes     Comment: Occ - Social drinker per [employer]    Drug use: No    Sexual activity: None   Lifestyle    Physical activity:     Days per week: None     Minutes per session: None    Stress: None   Relationships    Social connections:     Talks on phone: None     Gets together: None     Attends [employer] service: None     Active member of club or organization: None     Attends meetings of clubs or organizations: None     Relationship status: None    Intimate partner violence:     Fear of current or ex partner: None Emotionally abused: None     Physically abused: None     Forced sexual activity: None   Other Topics Concern    None   Social History Narrative    None       Family History     Family History   Problem Relation Age of Onset    Arthritis Mother     Hyperlipidemia Mother     Hypertension Father     Prostate cancer Father     Cancer Family     Diabetes Family     Heart disease Family     Other Family         hyperlipoprotein       Current Medications       Current Outpatient Medications:     atorvastatin (LIPITOR) 20 mg tablet, Take 1 tablet (20 mg total) by mouth daily, Disp: 90 tablet, Rfl: 0    flecainide (TAMBOCOR) 100 mg tablet, Take 1 tablet (100 mg total) by mouth 2 (two) times a day, Disp: 180 tablet, Rfl: 3    Magnesium 500 MG CAPS, Take by mouth, Disp: , Rfl:     Multiple Vitamin (MULTI VITAMIN DAILY PO), Take 1 tablet by mouth daily, Disp: , Rfl:     nebivolol (BYSTOLIC) 10 mg tablet, Take 1 tablet (10 mg total) by mouth daily, Disp: 90 tablet, Rfl: 0    Omega-3 Fatty Acids (FISH OIL) 1,000 mg, Take 1 capsule (1,000 mg total) by mouth 2 (two) times a day, Disp: 60 capsule, Rfl: 0    cyclobenzaprine (FLEXERIL) 10 mg tablet, Take 1 tablet (10 mg total) by mouth daily at bedtime for 21 days, Disp: 21 tablet, Rfl: 0    Naltrexone-Bupropion HCl ER (CONTRAVE) 8-90 MG TB12, 1 pill for 7 days in am, then 1pill BID for 7 days, then 2 pills in the am and one in PM for 7 days then 1 pill bid going forward (Patient not taking: Reported on 10/3/2019), Disp: 76 tablet, Rfl: 0     Allergies     No Known Allergies    Objective     /78   Pulse (!) 44   Temp (!) 96 7 °F (35 9 °C)   Resp 16   Ht 6' 3" (1 905 m)   Wt 129 kg (285 lb)   BMI 35 62 kg/m²      Physical Exam      No exam data present        Nadeem David DO  [address]  BMI Counseling: Body mass index is 35 62 kg/m²  The BMI is above normal  Nutrition recommendations include reducing portion sizes

## 2020-05-26 ENCOUNTER — TELEPHONE (OUTPATIENT)
Dept: CARDIOLOGY CLINIC | Facility: CLINIC | Age: 45
End: 2020-05-26

## 2020-05-26 ENCOUNTER — TELEMEDICINE (OUTPATIENT)
Dept: CARDIOLOGY CLINIC | Facility: CLINIC | Age: 45
End: 2020-05-26
Payer: COMMERCIAL

## 2020-05-26 VITALS — BODY MASS INDEX: 35.62 KG/M2 | WEIGHT: 285 LBS

## 2020-05-26 DIAGNOSIS — E78.2 ELEVATED TRIGLYCERIDES WITH HIGH CHOLESTEROL: ICD-10-CM

## 2020-05-26 DIAGNOSIS — I10 BENIGN ESSENTIAL HYPERTENSION: ICD-10-CM

## 2020-05-26 DIAGNOSIS — I48.91 ATRIAL FIBRILLATION, UNSPECIFIED TYPE (HCC): Primary | ICD-10-CM

## 2020-05-26 PROCEDURE — 99214 OFFICE O/P EST MOD 30 MIN: CPT | Performed by: INTERNAL MEDICINE

## 2020-05-26 RX ORDER — FLECAINIDE ACETATE 100 MG/1
100 TABLET ORAL 2 TIMES DAILY
Qty: 180 TABLET | Refills: 3 | Status: SHIPPED | OUTPATIENT
Start: 2020-05-26 | End: 2020-10-02 | Stop reason: ALTCHOICE

## 2020-05-26 RX ORDER — NEBIVOLOL 10 MG/1
10 TABLET ORAL DAILY
Qty: 90 TABLET | Refills: 3 | Status: SHIPPED | OUTPATIENT
Start: 2020-05-26 | End: 2020-08-07

## 2020-05-28 ENCOUNTER — TELEPHONE (OUTPATIENT)
Dept: CARDIOLOGY CLINIC | Facility: CLINIC | Age: 45
End: 2020-05-28

## 2020-06-15 ENCOUNTER — TELEPHONE (OUTPATIENT)
Dept: CARDIOLOGY CLINIC | Facility: CLINIC | Age: 45
End: 2020-06-15

## 2020-06-16 ENCOUNTER — TELEPHONE (OUTPATIENT)
Dept: CARDIOLOGY CLINIC | Facility: CLINIC | Age: 45
End: 2020-06-16

## 2020-06-16 ENCOUNTER — OFFICE VISIT (OUTPATIENT)
Dept: CARDIOLOGY CLINIC | Facility: CLINIC | Age: 45
End: 2020-06-16
Payer: COMMERCIAL

## 2020-06-16 VITALS
HEIGHT: 75 IN | WEIGHT: 294 LBS | BODY MASS INDEX: 36.56 KG/M2 | SYSTOLIC BLOOD PRESSURE: 126 MMHG | TEMPERATURE: 98 F | HEART RATE: 44 BPM | DIASTOLIC BLOOD PRESSURE: 80 MMHG

## 2020-06-16 DIAGNOSIS — I48.91 ATRIAL FIBRILLATION, UNSPECIFIED TYPE (HCC): ICD-10-CM

## 2020-06-16 DIAGNOSIS — I10 BENIGN ESSENTIAL HYPERTENSION: Primary | ICD-10-CM

## 2020-06-16 DIAGNOSIS — R00.2 PALPITATIONS: ICD-10-CM

## 2020-06-16 DIAGNOSIS — Z79.899 LONG TERM CURRENT USE OF ANTIARRHYTHMIC MEDICAL THERAPY: ICD-10-CM

## 2020-06-16 PROCEDURE — 3008F BODY MASS INDEX DOCD: CPT | Performed by: INTERNAL MEDICINE

## 2020-06-16 PROCEDURE — 3079F DIAST BP 80-89 MM HG: CPT | Performed by: INTERNAL MEDICINE

## 2020-06-16 PROCEDURE — 99211 OFF/OP EST MAY X REQ PHY/QHP: CPT | Performed by: INTERNAL MEDICINE

## 2020-06-16 PROCEDURE — 93000 ELECTROCARDIOGRAM COMPLETE: CPT | Performed by: INTERNAL MEDICINE

## 2020-06-16 PROCEDURE — 3074F SYST BP LT 130 MM HG: CPT | Performed by: INTERNAL MEDICINE

## 2020-06-16 PROCEDURE — 99244 OFF/OP CNSLTJ NEW/EST MOD 40: CPT | Performed by: INTERNAL MEDICINE

## 2020-06-17 ENCOUNTER — TELEPHONE (OUTPATIENT)
Dept: OTHER | Facility: OTHER | Age: 45
End: 2020-06-17

## 2020-06-19 DIAGNOSIS — E78.2 MIXED HYPERLIPIDEMIA: ICD-10-CM

## 2020-06-20 RX ORDER — ATORVASTATIN CALCIUM 20 MG/1
TABLET, FILM COATED ORAL
Qty: 90 TABLET | Refills: 0 | Status: SHIPPED | OUTPATIENT
Start: 2020-06-20 | End: 2020-09-22

## 2020-06-29 ENCOUNTER — CLINICAL SUPPORT (OUTPATIENT)
Dept: CARDIOLOGY CLINIC | Facility: CLINIC | Age: 45
End: 2020-06-29
Payer: COMMERCIAL

## 2020-06-29 DIAGNOSIS — I48.91 ATRIAL FIBRILLATION, UNSPECIFIED TYPE (HCC): ICD-10-CM

## 2020-06-29 PROCEDURE — 0298T PR EXT ECG > 48HR TO 21 DAY REVIEW AND INTERPRETATN: CPT | Performed by: INTERNAL MEDICINE

## 2020-07-09 ENCOUNTER — CLINICAL SUPPORT (OUTPATIENT)
Dept: CARDIOLOGY CLINIC | Facility: CLINIC | Age: 45
End: 2020-07-09

## 2020-07-09 DIAGNOSIS — I48.0 PAROXYSMAL ATRIAL FIBRILLATION (HCC): Primary | ICD-10-CM

## 2020-07-20 ENCOUNTER — TELEMEDICINE (OUTPATIENT)
Dept: CARDIOLOGY CLINIC | Facility: CLINIC | Age: 45
End: 2020-07-20

## 2020-07-20 VITALS — WEIGHT: 294 LBS | BODY MASS INDEX: 36.56 KG/M2 | HEIGHT: 75 IN

## 2020-07-20 DIAGNOSIS — R00.2 PALPITATIONS: Primary | ICD-10-CM

## 2020-07-20 PROCEDURE — 3008F BODY MASS INDEX DOCD: CPT | Performed by: INTERNAL MEDICINE

## 2020-07-20 PROCEDURE — 99024 POSTOP FOLLOW-UP VISIT: CPT | Performed by: INTERNAL MEDICINE

## 2020-07-20 PROCEDURE — 3079F DIAST BP 80-89 MM HG: CPT | Performed by: INTERNAL MEDICINE

## 2020-07-20 PROCEDURE — 3074F SYST BP LT 130 MM HG: CPT | Performed by: INTERNAL MEDICINE

## 2020-07-23 ENCOUNTER — CLINICAL SUPPORT (OUTPATIENT)
Dept: CARDIOLOGY CLINIC | Facility: CLINIC | Age: 45
End: 2020-07-23
Payer: COMMERCIAL

## 2020-07-23 DIAGNOSIS — I48.91 ATRIAL FIBRILLATION, UNSPECIFIED TYPE (HCC): Primary | ICD-10-CM

## 2020-07-23 PROCEDURE — 0298T PR EXT ECG > 48HR TO 21 DAY REVIEW AND INTERPRETATN: CPT | Performed by: INTERNAL MEDICINE

## 2020-07-24 NOTE — ADDENDUM NOTE
Addended by: Everton Valencia on: 7/24/2020 08:38 AM     Modules accepted: Level of Service, SmartSet

## 2020-08-03 ENCOUNTER — TELEPHONE (OUTPATIENT)
Dept: CARDIOLOGY CLINIC | Facility: CLINIC | Age: 45
End: 2020-08-03

## 2020-08-07 DIAGNOSIS — I10 BENIGN ESSENTIAL HYPERTENSION: Primary | ICD-10-CM

## 2020-08-07 RX ORDER — NEBIVOLOL 5 MG/1
5 TABLET ORAL DAILY
Qty: 90 TABLET | Refills: 3 | Status: SHIPPED | OUTPATIENT
Start: 2020-08-07 | End: 2020-11-12 | Stop reason: SDUPTHER

## 2020-08-07 NOTE — TELEPHONE ENCOUNTER
Monitor was reviewed  He was recommended to cut back to bystolic 5mg daily due to the bradycardia for which the patient continuously hit his trigger of the Zio patch  He will continue to monitor his blood pressure  It was discussed that he did not have any atrial fibrillation on the monitor  Patient would still like to proceed with atrial fibrillation ablation given the fact that he is young and would like to come off flecainide  Therefore, will make our office aware to reach out to him for CT scan of pulmonary vein and set up an ablation

## 2020-08-21 ENCOUNTER — TELEMEDICINE (OUTPATIENT)
Dept: CARDIOLOGY CLINIC | Facility: CLINIC | Age: 45
End: 2020-08-21
Payer: COMMERCIAL

## 2020-08-21 DIAGNOSIS — R00.2 PALPITATIONS: ICD-10-CM

## 2020-08-21 DIAGNOSIS — Z79.899 LONG TERM CURRENT USE OF ANTIARRHYTHMIC MEDICAL THERAPY: ICD-10-CM

## 2020-08-21 DIAGNOSIS — I48.0 PAROXYSMAL ATRIAL FIBRILLATION (HCC): Primary | ICD-10-CM

## 2020-08-21 DIAGNOSIS — I10 BENIGN ESSENTIAL HYPERTENSION: ICD-10-CM

## 2020-08-21 PROCEDURE — 3074F SYST BP LT 130 MM HG: CPT | Performed by: INTERNAL MEDICINE

## 2020-08-21 PROCEDURE — 1036F TOBACCO NON-USER: CPT | Performed by: INTERNAL MEDICINE

## 2020-08-21 PROCEDURE — 99214 OFFICE O/P EST MOD 30 MIN: CPT | Performed by: INTERNAL MEDICINE

## 2020-08-21 PROCEDURE — 3079F DIAST BP 80-89 MM HG: CPT | Performed by: INTERNAL MEDICINE

## 2020-08-21 NOTE — PROGRESS NOTES
Virtual Regular Visit      Assessment/Plan:   he has a history of symptomatic paroxysmal atrial fibrillation according to the and his wife who accompanied him today these episodes were 30 minutes to 1 hour and occurred when he was under an undue amount of stress from work and a new a child  She believes that his lifestyle has changed dramatically since the time of his atrial fibrillation  We agreed upon a plan stopping the flecainide given that his lifestyle has changed significantly and if he has recurrent atrial fibrillation we will move forward with the ablation  If he has recurrent atrial fibrillation off the flecainide he will simply restart the flecainide and we will schedule an ablation he has hypertension and I recommend that he remain on nebivolol 5 mg daily and increase to 10 mg if his blood pressure goes over 130/80 I asked him to check this a few times a week  We have discussed atrial fibrillation ablation extensively in the past he will follow up with me on a p r n  basis    We spent 25 minutes today during this video visit    Problem List Items Addressed This Visit        Cardiovascular and Mediastinum    Benign essential hypertension    Atrial fibrillation ([address] ) - Primary       Other    Palpitations    BMI 36 0-36 9,adult    Long term current use of antiarrhythmic medical therapy               Reason for visit is   Chief Complaint   Patient presents with    Follow-up     Discuss ablation   Virtual Regular Visit        Encounter provider Trenton Naqvi DO    Provider located at [address] [place] [address] [address] [place] [address]      Recent Visits  No visits were found meeting these conditions     Showing recent visits within past 7 days and meeting all other requirements     Today's Visits  Date Type Provider Dept   08/21/20 Telemedicine Madhu Campbell, [facility] today's visits and meeting all other requirements     Future Appointments  No visits were found meeting these conditions  Showing future appointments within next 150 days and meeting all other requirements        The patient was identified by name and date of birth  Deion Flores was informed that this is a telemedicine visit and that the visit is being conducted through [employer] and patient was informed that this is not a secure, HIPAA-complaint platform  He agrees to proceed     My office door was closed  No one else was in the room  He acknowledged consent and understanding of privacy and security of the video platform  The patient has agreed to participate and understands they can discontinue the visit at any time  Patient is aware this is a billable service  Subjective  Deion Flores is a 40 y o  male  Follows up for paroxysmal atrial fibrillation this developed at a time of extreme stress when he had a new baby and he was under quite a bit of job stress  There is also a history of borderline hypertension palpitations  No history of alcohol tobacco or drug use  He wore a monitor for palpitations and did not have any atrial fibrillation during the monitor there were times were bradycardia was noted and his Bystolic was back down from 10 mg daily to 5 mg daily    He really would like to try to stop taking the flecainide         HPI     Past Medical History:   Diagnosis Date    Atrial fibrillation ([facility] )     Benign essential hypertension     BMI 38 0-38 9,adult 08/29/2016    Resolved:  September 22, 2017    Finger deformity, left 02/16/2016    Resolved:  September 22, 2017    Fracture of finger, left, closed 05/26/2016    Resolved:  September 22, 2017    Fracture of proximal phalanx of lesser toe of left foot 08/29/2016    Initial encounter:  Resolved:  September 22, 2017    H/O nausea and vomiting     H/O vitamin D deficiency     Hyperlipidemia     Hypertension     Mixed hyperlipidemia     Neoplasm of uncertain behavior of skin 08/02/2012    Resolved:  February 16, 2016    Obesity 02/16/2016    Resolved:  January 16, 2018    Overweight 02/2014    Resolved:  February 16, 2016    Sebaceous cyst 11/15/2010       Past Surgical History:   Procedure Laterality Date    WRIST SURGERY         Current Outpatient Medications   Medication Sig Dispense Refill    atorvastatin (LIPITOR) 20 mg tablet Take 1 tablet by mouth once daily 90 tablet 0    flecainide (TAMBOCOR) 100 mg tablet Take 1 tablet (100 mg total) by mouth 2 (two) times a day 180 tablet 3    Magnesium 500 MG CAPS Take by mouth      Multiple Vitamin (MULTI VITAMIN DAILY PO) Take 1 tablet by mouth daily      nebivolol (BYSTOLIC) 5 mg tablet Take 1 tablet (5 mg total) by mouth daily 90 tablet 3    Omega-3 Fatty Acids (FISH OIL) 1,000 mg Take 1 capsule (1,000 mg total) by mouth 2 (two) times a day 60 capsule 0    patient supplied medication        No current facility-administered medications for this visit  No Known Allergies    Review of Systems   12 point review of systems positive for  Occasional palpitations all other 12 point ROS negative  Video Exam    There were no vitals filed for this visit  Physical Exam     GEN: NAD, Alert and oriented, well appearing  HEENT:Head, neck, ears, oral pharynx: Mucus membranes moist, oral pharynx clear, nares clear  External ears normal  EYES: Pupils equal, sclera anicteric  NECK: No JVD   EXTREMITIES/VASCULAR: No edema  PSYCH: Normal Affect  NEURO: Grossly intact, moving all extremiteis equal, face symetric  HEME: No bleeding, bruising, petechia  SKIN: No significant rashes      I spent 25 minutes directly with the patient during this visit      VIRTUAL VISIT DISCLAIMER    Mahad Corral acknowledges that he has consented to an online visit or consultation   He understands that the online visit is based solely on information provided by him, and that, in the absence of a face-to-face physical evaluation by the physician, the diagnosis he receives is both limited and provisional in terms of accuracy and completeness  This is not intended to replace a full medical face-to-face evaluation by the physician  Fe Matthew understands and accepts these terms

## 2020-09-01 ENCOUNTER — TELEPHONE (OUTPATIENT)
Dept: CARDIOLOGY CLINIC | Facility: CLINIC | Age: 45
End: 2020-09-01

## 2020-09-01 DIAGNOSIS — I48.0 PAROXYSMAL ATRIAL FIBRILLATION (HCC): Primary | ICD-10-CM

## 2020-09-01 NOTE — TELEPHONE ENCOUNTER
Spoke with patient in regard procedure, he mentioned had an appointment with Dr Giovanni Lenz on 8/21/20, per Dr Giovanni Lenz the procedure will be hold off for now

## 2020-09-01 NOTE — TELEPHONE ENCOUNTER
----- Message from Maria Elena Marrufo sent at 8/10/2020  3:05 PM EDT -----  Kenan Becerra Afternoon Everyone,    Patient is scheduled for a virtual visit per his request on 8/21 with Dr Marychuy Draper to discuss ablation  Thank you,  Jimbo Clark  ----- Message -----  From: Carlos Miller DO  Sent: 8/10/2020   2:52 PM EDT  To: Mahogany Leo MA, Sunita Griffiths, #     Let us get both scheduled please  ----- Message -----  From: Maria Elena Marrufo  Sent: 8/10/2020   9:04 AM EDT  To: Carlos Miller DO, Mahogany Leo MA, #    Good Morning Everyone,    Would you like me to schedule the above patient's office visit 1st and then we can all move forward from there? Please advise, thank you  Jimbo Clark  ----- Message -----  From: Carlos Miller DO  Sent: 8/7/2020   5:13 PM EDT  To: Maria Elena Marrufo, #    Please call patient schedule CT scan pulmonary veins and atrial fibrillation ablation with cryo and [facility]   He also wants to see me in person before the ablation   thanks

## 2020-09-21 ENCOUNTER — APPOINTMENT (OUTPATIENT)
Dept: RADIOLOGY | Facility: CLINIC | Age: 45
End: 2020-09-21
Payer: COMMERCIAL

## 2020-09-21 ENCOUNTER — OFFICE VISIT (OUTPATIENT)
Dept: URGENT CARE | Facility: CLINIC | Age: 45
End: 2020-09-21
Payer: COMMERCIAL

## 2020-09-21 VITALS
TEMPERATURE: 97.3 F | BODY MASS INDEX: 37.22 KG/M2 | WEIGHT: 290 LBS | SYSTOLIC BLOOD PRESSURE: 142 MMHG | OXYGEN SATURATION: 98 % | DIASTOLIC BLOOD PRESSURE: 81 MMHG | HEART RATE: 51 BPM | HEIGHT: 74 IN | RESPIRATION RATE: 16 BRPM

## 2020-09-21 DIAGNOSIS — S62.655A CLOSED NONDISPLACED FRACTURE OF MIDDLE PHALANX OF LEFT RING FINGER, INITIAL ENCOUNTER: ICD-10-CM

## 2020-09-21 DIAGNOSIS — S69.92XA FINGER INJURY, LEFT, INITIAL ENCOUNTER: Primary | ICD-10-CM

## 2020-09-21 DIAGNOSIS — S69.92XA FINGER INJURY, LEFT, INITIAL ENCOUNTER: ICD-10-CM

## 2020-09-21 PROCEDURE — 99213 OFFICE O/P EST LOW 20 MIN: CPT | Performed by: PHYSICIAN ASSISTANT

## 2020-09-21 PROCEDURE — 73130 X-RAY EXAM OF HAND: CPT

## 2020-09-21 NOTE — PATIENT INSTRUCTIONS
Finger Fracture   WHAT YOU NEED TO KNOW:   A finger fracture is a break in 1 or more of the bones in your finger  DISCHARGE INSTRUCTIONS:   Return to the emergency department if:   · Your cast or splint gets wet, damaged, or comes off  · Your splint or cast feels too tight  · You have severe pain  · Your injured finger is numb, cold, or pale  Contact your healthcare provider or hand specialist if:   · Your pain or swelling gets worse, even after treatment  · You have questions or concerns about your condition or care  Medicines:   · NSAIDs , such as ibuprofen, help decrease swelling, pain, and fever  This medicine is available with or without a doctor's order  NSAIDs can cause stomach bleeding or kidney problems in certain people  If you take blood thinner medicine, always ask your healthcare provider if NSAIDs are safe for you  Always read the medicine label and follow directions  · Acetaminophen  decreases pain and fever  It is available without a doctor's order  Ask how much to take and how often to take it  Follow directions  Acetaminophen can cause liver damage if not taken correctly  · Prescription pain medicine  may be given  Ask your healthcare provider how to take this medicine safely  Some prescription pain medicines contain acetaminophen  Do not take other medicines that contain acetaminophen without talking to your healthcare provider  Too much acetaminophen may cause liver damage  Prescription pain medicine may cause constipation  Ask your healthcare provider how to prevent or treat constipation  · Take your medicine as directed  Contact your healthcare provider if you think your medicine is not helping or if you have side effects  Tell him or her if you are allergic to any medicine  Keep a list of the medicines, vitamins, and herbs you take  Include the amounts, and when and why you take them  Bring the list or the pill bottles to follow-up visits   Carry your medicine list with you in case of an emergency  Self-care:   · Wear your splint as directed  Do not remove your splint until you follow up with your healthcare provider or hand specialist      · Apply ice  on your finger for 15 to 20 minutes every hour or as directed  Use an ice pack, or put crushed ice in a plastic bag  Cover it with a towel before you apply it to your skin  Ice helps prevent tissue damage and decreases swelling and pain  · Elevate  your finger above the level of your heart as often as you can  This will help decrease swelling and pain  Prop your hand on pillows or blankets to keep it elevated comfortably  · Go to physical therapy as directed  A physical therapist teaches you exercises to help improve movement and strength, and to decrease pain  Follow up with your healthcare provider or hand specialist within 2 days:  Write down your questions so you remember to ask them during your visits  © 2017 [address]  Information is for End User's use only and may not be sold, redistributed or otherwise used for commercial purposes  All illustrations and images included in CareNotes® are the copyrighted property of [employer] , [employer]  or Clint Valenzuela  The above information is an  only  It is not intended as medical advice for individual conditions or treatments  Talk to your doctor, nurse or pharmacist before following any medical regimen to see if it is safe and effective for you

## 2020-09-21 NOTE — PROGRESS NOTES
[facility]        NAME: Althea Hill is a 40 y o  male  : 1975    MRN: [id number]  DATE:  2020  TIME: 12:02 PM    Assessment and Plan   Finger injury, left, initial encounter [S69 [id number]]  1  Finger injury, left, initial encounter  Ambulatory referral to Orthopedic Surgery    CANCELED: XR finger left fourth digit-ring   2  Closed nondisplaced fracture of middle phalanx of left ring finger, initial encounter  Ambulatory referral to Orthopedic Surgery         Patient Instructions     Discussed condition with patient  X-ray of the left 4th finger reveals fracture at the base of the middle phalanx  He was placed in splint and referred to ortho for consult  Discussed pain control with Tylenol/NSAIDs  Follow up with PCP in 3-5 days  Proceed to  ER if symptoms worsen  Chief Complaint     Chief Complaint   Patient presents with    Finger Injury     jammed left 4th finger playing football has limited ROM with the finger         History of Present Illness       Patient presents after sustaining injury to left 4th finger  He reports that he was playing football and when he caught the ball, itchy and the finger and now he reports pain, bruising, and inability to fully extend at the PIP joint  Denies loss of sensation  Denies any other area of injury  Review of Systems   Review of Systems   Constitutional: Negative  Respiratory: Negative  Cardiovascular: Negative  Gastrointestinal: Negative  Genitourinary: Negative  Musculoskeletal:        Left 4th finger injury   Neurological: Negative            Current Medications       Current Outpatient Medications:     Magnesium 500 MG CAPS, Take by mouth, Disp: , Rfl:     Multiple Vitamin (MULTI VITAMIN DAILY PO), Take 1 tablet by mouth daily, Disp: , Rfl:     nebivolol (BYSTOLIC) 5 mg tablet, Take 1 tablet (5 mg total) by mouth daily, Disp: 90 tablet, Rfl: 3    Omega-3 Fatty Acids (FISH OIL) 1,000 mg, Take 1 capsule (1,000 mg total) by mouth 2 (two) times a day, Disp: 60 capsule, Rfl: 0    patient supplied medication, , Disp: , Rfl:     atorvastatin (LIPITOR) 20 mg tablet, Take 1 tablet by mouth once daily, Disp: 90 tablet, Rfl: 0    flecainide (TAMBOCOR) 100 mg tablet, Take 1 tablet (100 mg total) by mouth 2 (two) times a day (Patient not taking: Reported on 9/21/2020), Disp: 180 tablet, Rfl: 3    Current Allergies     Allergies as of 09/21/2020    (No Known Allergies)            The following portions of the patient's history were reviewed and updated as appropriate: allergies, current medications, past family history, past medical history, past social history, past surgical history and problem list      Past Medical History:   Diagnosis Date    Atrial fibrillation ([facility] )     Benign essential hypertension     BMI 38 0-38 9,adult 08/29/2016    Resolved:  September 22, 2017    Finger deformity, left 02/16/2016    Resolved:  September 22, 2017    Fracture of finger, left, closed 05/26/2016    Resolved:  September 22, 2017    Fracture of proximal phalanx of lesser toe of left foot 08/29/2016    Initial encounter:  Resolved:  September 22, 2017    H/O nausea and vomiting     H/O vitamin D deficiency     Hyperlipidemia     Hypertension     Mixed hyperlipidemia     Neoplasm of uncertain behavior of skin 08/02/2012    Resolved:  February 16, 2016    Obesity 02/16/2016    Resolved:  January 16, 2018    Overweight 02/2014    Resolved:  February 16, 2016    Sebaceous cyst 11/15/2010       Past Surgical History:   Procedure Laterality Date    WRIST SURGERY         Family History   Problem Relation Age of Onset    Arthritis Mother     Hyperlipidemia Mother     Hypertension Father     Prostate cancer Father     Cancer Family     Diabetes Family     Heart disease Family     Other Family         hyperlipoprotein         Medications have been verified          Objective   /81   Pulse (!) 51   Temp (!) 97 3 °F (36 3 °C)   Resp 16   Ht 6' 2" (1 88 m)   Wt 132 kg (290 lb)   SpO2 98%   BMI 37 23 kg/m²        Physical Exam     Physical Exam  Vitals signs reviewed  Constitutional:       General: He is not in acute distress  Appearance: He is well-developed  Musculoskeletal:      Comments: Tenderness to palpation, soft tissue swelling, ecchymosis over the left 4th finger PIP joint which is in position of flexion  Patient is unable to extend at the PIP and DIP joint is hyper extended  Remainder of examination of left hand is unremarkable  Neurological:      Mental Status: He is alert and oriented to person, place, and time  Sensory: No sensory deficit

## 2020-09-22 DIAGNOSIS — E78.2 MIXED HYPERLIPIDEMIA: ICD-10-CM

## 2020-09-22 RX ORDER — ATORVASTATIN CALCIUM 20 MG/1
TABLET, FILM COATED ORAL
Qty: 90 TABLET | Refills: 0 | Status: SHIPPED | OUTPATIENT
Start: 2020-09-22 | End: 2021-01-04

## 2020-09-28 ENCOUNTER — CONSULT (OUTPATIENT)
Dept: OBGYN CLINIC | Facility: CLINIC | Age: 45
End: 2020-09-28
Payer: COMMERCIAL

## 2020-09-28 ENCOUNTER — APPOINTMENT (OUTPATIENT)
Dept: RADIOLOGY | Facility: AMBULARY SURGERY CENTER | Age: 45
End: 2020-09-28
Attending: ORTHOPAEDIC SURGERY
Payer: COMMERCIAL

## 2020-09-28 VITALS
DIASTOLIC BLOOD PRESSURE: 80 MMHG | BODY MASS INDEX: 35.68 KG/M2 | HEART RATE: 45 BPM | WEIGHT: 287 LBS | HEIGHT: 75 IN | SYSTOLIC BLOOD PRESSURE: 146 MMHG

## 2020-09-28 DIAGNOSIS — S69.92XA FINGER INJURY, LEFT, INITIAL ENCOUNTER: ICD-10-CM

## 2020-09-28 DIAGNOSIS — S62.655A CLOSED NONDISPLACED FRACTURE OF MIDDLE PHALANX OF LEFT RING FINGER, INITIAL ENCOUNTER: Primary | ICD-10-CM

## 2020-09-28 PROCEDURE — 99243 OFF/OP CNSLTJ NEW/EST LOW 30: CPT | Performed by: ORTHOPAEDIC SURGERY

## 2020-09-28 PROCEDURE — 26740 TREAT FINGER FRACTURE EACH: CPT | Performed by: ORTHOPAEDIC SURGERY

## 2020-09-28 PROCEDURE — 73140 X-RAY EXAM OF FINGER(S): CPT

## 2020-09-28 NOTE — PROGRESS NOTES
Assessment/Plan:  1  Closed nondisplaced fracture of middle phalanx of left ring finger, initial encounter  Ambulatory referral to Orthopedic Surgery    Fracture / Dislocation Treatment   2  Finger injury, left, initial encounter  Ambulatory referral to Orthopedic Surgery    XR finger left fourth digit-ring     Patient Active Problem List   Diagnosis    Benign essential hypertension    Fatty liver disease, nonalcoholic    Mixed hyperlipidemia    Obesity    Organic impotence    Palpitations    Atrial fibrillation (HCC)    BMI 36 0-36 9,adult    Mild episode of recurrent major depressive disorder ([facility] [address] )    Anxiety    Long term current use of antiarrhythmic medical therapy       Discussion/Summary:    40 y o  male  With nondisplaced fracture of the base of the middle phalanx of left ring finger  He will be placed in dorsal blocking splint for the next 3 weeks   follow-up in 3 weeks for repeat x-ray of left ring finger   at that time will consider transition to nathan taping and possible hand therapy referral    The patient was seen and examined by Dr Freddy Lepe and myself  The assessment and plan were formulated by Dr Freddy Lepe and I assisted in carrying it out  To Do Next Visit:  X-rays of the  left  ring finger      Subjective:   Patient ID: Pilo Wilson is a 40 y o  male   He is right-hand-dominant    HPI    Patient presents to the office complaining of  Left hand pain  Patient is referred to us by Isiah Gallego PA-C for consultation for these symptoms  Symptoms began  1 week ago while playing flag football he went out to grab a flag and hit another player's waist with the finger and had immediate pain in the left  Ring finger  Pain is located   PIP joint of left ring finger  Pain is described as  Dull sometimes sharp,  intermittent  The pain  Does not radiate   The pain is  Mild-to-moderate  It is made worse by  Direct contact to the finger   It is made better by  rest  So far the patient has tried  Splint immobilization and ice  The patient  denies past episodes similar to this  The patient  denies any numbness or tingling   He is in an extension splint today from the volar mcp to the dorsal phalanx of the ring finger    The following portions of the patient's history were reviewed and updated as appropriate: allergies, current medications, past family history, past social history, past surgical history and problem list     Social History     Socioeconomic History    Marital status: Single     Spouse name: Not on file    Number of children: Not on file    Years of education: Not on file    Highest education level: Not on file   Occupational History    Not on file   Social Needs    Financial resource strain: Not on file    Food insecurity     Worry: Not on file     Inability: Not on file    Transportation needs     Medical: Not on file     Non-medical: Not on file   Tobacco Use    Smoking status: Never Smoker    Smokeless tobacco: Never Used   Substance and Sexual Activity    Alcohol use: Yes     Comment: Occ - Social drinker per [employer]    Drug use: No    Sexual activity: Not on file   Lifestyle    Physical activity     Days per week: Not on file     Minutes per session: Not on file    Stress: Not on file   Relationships    Social connections     Talks on phone: Not on file     Gets together: Not on file     Attends [employer] service: Not on file     Active member of club or organization: Not on file     Attends meetings of clubs or organizations: Not on file     Relationship status: Not on file    Intimate partner violence     Fear of current or ex partner: Not on file     Emotionally abused: Not on file     Physically abused: Not on file     Forced sexual activity: Not on file   Other Topics Concern    Not on file   Social History Narrative    Not on file     Past Medical History:   Diagnosis Date    Atrial fibrillation ([facility] [address] )     Benign essential hypertension     BMI 38 0-38 9,adult 08/29/2016    Resolved:  September 22, 2017    Finger deformity, left 02/16/2016    Resolved:  September 22, 2017    Fracture of finger, left, closed 05/26/2016    Resolved:  September 22, 2017    Fracture of proximal phalanx of lesser toe of left foot 08/29/2016    Initial encounter:  Resolved:  September 22, 2017    H/O nausea and vomiting     H/O vitamin D deficiency     Hyperlipidemia     Hypertension     Mixed hyperlipidemia     Neoplasm of uncertain behavior of skin 08/02/2012    Resolved:  February 16, 2016    Obesity 02/16/2016    Resolved:  January 16, 2018    Overweight 02/2014    Resolved:  February 16, 2016    Sebaceous cyst 11/15/2010     Past Surgical History:   Procedure Laterality Date    WRIST SURGERY       No Known Allergies  Current Outpatient Medications on File Prior to Visit   Medication Sig Dispense Refill    atorvastatin (LIPITOR) 20 mg tablet Take 1 tablet by mouth once daily 90 tablet 0    flecainide (TAMBOCOR) 100 mg tablet Take 1 tablet (100 mg total) by mouth 2 (two) times a day (Patient not taking: Reported on 9/21/2020) 180 tablet 3    Magnesium 500 MG CAPS Take by mouth      Multiple Vitamin (MULTI VITAMIN DAILY PO) Take 1 tablet by mouth daily      nebivolol (BYSTOLIC) 5 mg tablet Take 1 tablet (5 mg total) by mouth daily 90 tablet 3    Omega-3 Fatty Acids (FISH OIL) 1,000 mg Take 1 capsule (1,000 mg total) by mouth 2 (two) times a day 60 capsule 0    patient supplied medication        No current facility-administered medications on file prior to visit  Review of Systems   Constitutional: Negative for chills, fever and unexpected weight change  HENT: Negative for hearing loss, nosebleeds and sore throat  Eyes: Negative for pain, redness and visual disturbance  Respiratory: Negative for cough, shortness of breath and wheezing  Cardiovascular: Negative for chest pain, palpitations and leg swelling     Gastrointestinal: Negative for abdominal pain, nausea and vomiting  Endocrine: Negative for polydipsia and polyuria  Genitourinary: Negative for dysuria and hematuria  Musculoskeletal:          As noted in HPI   Skin: Negative for rash and wound  Neurological: Negative for dizziness, numbness and headaches  Psychiatric/Behavioral: Negative for decreased concentration, dysphoric mood and suicidal ideas  The patient is not nervous/anxious  Objective:    Vitals:    09/28/20 1141   BP: 146/80   Pulse: (!) 45       Physical Exam  Constitutional:       General: He is not in acute distress  Appearance: He is well-developed  HENT:      Head: Normocephalic and atraumatic  Eyes:      General: No scleral icterus  Conjunctiva/sclera: Conjunctivae normal    Neck:      Musculoskeletal: Neck supple  Trachea: No tracheal deviation  Cardiovascular:      Comments: No discernible arrhthymias   Pulmonary:      Effort: Pulmonary effort is normal  No respiratory distress  Skin:     General: Skin is warm and dry  Neurological:      Mental Status: He is alert and oriented to person, place, and time  Psychiatric:         Behavior: Behavior normal          Left Hand Exam     Other   Erythema: absent  Scars: absent  Sensation: normal  Pulse: present    Comments: There is mild swelling and no ecchymosis no visible deformity around the middle phalanx and PIP joint of ring finger   flexion-extension are intact in the DIP and PIP joints of the ring finger            I have personally reviewed pertinent films in PACS and my interpretation is  1st no interval displacement of base of middle phalanx of left ring finger seen on today's x-ray fracture lucency still visible      Fracture / Dislocation Treatment    Date/Time: 9/28/2020 12:05 PM  Performed by: Tre Hutton DO  Authorized by: Tre Hutton DO     Patient Location:  Clinic  Verbal consent obtained?: Yes    Risks and benefits: Risks, benefits and alternatives were discussed    Consent given by: Patient  Patient identity confirmed:  Verbally with patient  Injury location:  Finger  Location details:  Left ring finger  Injury type:  Fracture  Fracture type: middle phalanx    Any IP joint involved?: Yes    Neurovascular status: Neurovascularly intact    Immobilization:  Splint (dorsal blocking splint)  Neurovascular status: Neurovascularly intact    Patient tolerance:  Patient tolerated the procedure well with no immediate complications          Portions of the record may have been created with voice recognition software  Occasional wrong word or "sound a like" substitutions may have occurred due to the inherent limitations of voice recognition software  Read the chart carefully and recognize, using context, where substitutions have occurred

## 2020-10-02 ENCOUNTER — APPOINTMENT (OUTPATIENT)
Dept: RADIOLOGY | Facility: CLINIC | Age: 45
End: 2020-10-02
Payer: COMMERCIAL

## 2020-10-02 ENCOUNTER — OFFICE VISIT (OUTPATIENT)
Dept: FAMILY MEDICINE CLINIC | Facility: CLINIC | Age: 45
End: 2020-10-02
Payer: COMMERCIAL

## 2020-10-02 VITALS
HEART RATE: 72 BPM | RESPIRATION RATE: 16 BRPM | BODY MASS INDEX: 35.56 KG/M2 | TEMPERATURE: 98 F | DIASTOLIC BLOOD PRESSURE: 88 MMHG | HEIGHT: 75 IN | SYSTOLIC BLOOD PRESSURE: 144 MMHG | WEIGHT: 286 LBS

## 2020-10-02 DIAGNOSIS — S59.901A ELBOW INJURY, RIGHT, INITIAL ENCOUNTER: ICD-10-CM

## 2020-10-02 DIAGNOSIS — S59.901A ELBOW INJURY, RIGHT, INITIAL ENCOUNTER: Primary | ICD-10-CM

## 2020-10-02 PROCEDURE — 1036F TOBACCO NON-USER: CPT | Performed by: FAMILY MEDICINE

## 2020-10-02 PROCEDURE — 3079F DIAST BP 80-89 MM HG: CPT | Performed by: FAMILY MEDICINE

## 2020-10-02 PROCEDURE — 73080 X-RAY EXAM OF ELBOW: CPT

## 2020-10-02 PROCEDURE — 99213 OFFICE O/P EST LOW 20 MIN: CPT | Performed by: FAMILY MEDICINE

## 2020-10-07 ENCOUNTER — TELEPHONE (OUTPATIENT)
Dept: FAMILY MEDICINE CLINIC | Facility: CLINIC | Age: 45
End: 2020-10-07

## 2020-10-19 ENCOUNTER — APPOINTMENT (OUTPATIENT)
Dept: RADIOLOGY | Facility: AMBULARY SURGERY CENTER | Age: 45
End: 2020-10-19
Payer: COMMERCIAL

## 2020-10-19 ENCOUNTER — OFFICE VISIT (OUTPATIENT)
Dept: OBGYN CLINIC | Facility: CLINIC | Age: 45
End: 2020-10-19

## 2020-10-19 VITALS — WEIGHT: 286 LBS | HEIGHT: 75 IN | BODY MASS INDEX: 35.56 KG/M2

## 2020-10-19 DIAGNOSIS — S62.655A CLOSED NONDISPLACED FRACTURE OF MIDDLE PHALANX OF LEFT RING FINGER, INITIAL ENCOUNTER: ICD-10-CM

## 2020-10-19 DIAGNOSIS — S59.901A ELBOW INJURY, RIGHT, INITIAL ENCOUNTER: ICD-10-CM

## 2020-10-19 DIAGNOSIS — S62.655D CLOSED NONDISPLACED FRACTURE OF MIDDLE PHALANX OF LEFT RING FINGER WITH ROUTINE HEALING, SUBSEQUENT ENCOUNTER: Primary | ICD-10-CM

## 2020-10-19 PROCEDURE — 99024 POSTOP FOLLOW-UP VISIT: CPT | Performed by: PHYSICIAN ASSISTANT

## 2020-10-19 PROCEDURE — 73140 X-RAY EXAM OF FINGER(S): CPT

## 2020-11-12 DIAGNOSIS — I10 BENIGN ESSENTIAL HYPERTENSION: ICD-10-CM

## 2020-11-12 RX ORDER — NEBIVOLOL 10 MG/1
10 TABLET ORAL DAILY
Qty: 90 TABLET | Refills: 3 | Status: SHIPPED | OUTPATIENT
Start: 2020-11-12 | End: 2022-03-11 | Stop reason: SDUPTHER

## 2021-01-02 DIAGNOSIS — E78.2 MIXED HYPERLIPIDEMIA: ICD-10-CM

## 2021-01-04 RX ORDER — ATORVASTATIN CALCIUM 20 MG/1
TABLET, FILM COATED ORAL
Qty: 90 TABLET | Refills: 0 | Status: SHIPPED | OUTPATIENT
Start: 2021-01-04 | End: 2021-04-29

## 2021-04-28 DIAGNOSIS — E78.2 MIXED HYPERLIPIDEMIA: ICD-10-CM

## 2021-04-29 RX ORDER — ATORVASTATIN CALCIUM 20 MG/1
TABLET, FILM COATED ORAL
Qty: 90 TABLET | Refills: 0 | Status: SHIPPED | OUTPATIENT
Start: 2021-04-29 | End: 2021-11-01

## 2021-09-18 ENCOUNTER — TELEPHONE (OUTPATIENT)
Dept: FAMILY MEDICINE CLINIC | Facility: CLINIC | Age: 46
End: 2021-09-18

## 2021-09-18 PROBLEM — F33.0 MILD EPISODE OF RECURRENT MAJOR DEPRESSIVE DISORDER (HCC): Status: RESOLVED | Noted: 2019-08-06 | Resolved: 2021-09-18

## 2021-09-23 ENCOUNTER — OFFICE VISIT (OUTPATIENT)
Dept: FAMILY MEDICINE CLINIC | Facility: CLINIC | Age: 46
End: 2021-09-23
Payer: COMMERCIAL

## 2021-09-23 VITALS
DIASTOLIC BLOOD PRESSURE: 80 MMHG | BODY MASS INDEX: 36.06 KG/M2 | WEIGHT: 281 LBS | HEART RATE: 52 BPM | OXYGEN SATURATION: 97 % | SYSTOLIC BLOOD PRESSURE: 136 MMHG | HEIGHT: 74 IN | RESPIRATION RATE: 20 BRPM | TEMPERATURE: 98.1 F

## 2021-09-23 DIAGNOSIS — Z00.00 HEALTHCARE MAINTENANCE: Primary | ICD-10-CM

## 2021-09-23 DIAGNOSIS — R73.03 PREDIABETES: ICD-10-CM

## 2021-09-23 DIAGNOSIS — I48.0 PAF (PAROXYSMAL ATRIAL FIBRILLATION) (HCC): ICD-10-CM

## 2021-09-23 DIAGNOSIS — E78.2 MIXED HYPERLIPIDEMIA: ICD-10-CM

## 2021-09-23 PROCEDURE — 99396 PREV VISIT EST AGE 40-64: CPT | Performed by: FAMILY MEDICINE

## 2021-09-23 PROCEDURE — 3008F BODY MASS INDEX DOCD: CPT | Performed by: FAMILY MEDICINE

## 2021-09-23 PROCEDURE — 3725F SCREEN DEPRESSION PERFORMED: CPT | Performed by: FAMILY MEDICINE

## 2021-09-23 PROCEDURE — 1036F TOBACCO NON-USER: CPT | Performed by: FAMILY MEDICINE

## 2021-09-23 NOTE — PROGRESS NOTES
Assessment/Plan:    No problem-specific Assessment & Plan notes found for this encounter  cpe    Update labs  Suggest q6m  Work on bmi and diet/exercise/wt loss  Considering covid vaccine    Cardiology f/u for PAF  Continue statin, declines refill at this time    Counseled about prediabetes on last labs, add a1c to next labs     Diagnoses and all orders for this visit:    Healthcare maintenance    PAF (paroxysmal atrial fibrillation) ([facility] [address] )    Mixed hyperlipidemia    Prediabetes  -     Comprehensive metabolic panel; Future  -     Hemoglobin A1C; Future        Return in about 6 months (around 3/23/2022) for Recheck with Dr Manda Boswell, [address]  Subjective:      Patient ID: Yogesh Delgado is a 39 y o  male  Chief Complaint   Patient presents with    Physical Exam     [place] Dose        HPI    Taking his meds  Works on diet  Mau past 4m, lower carbs  More fiber/protein  Cutting back on portions  Having smaller, earlier dinners    Likes sweets but less now  Lots of water  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  [employer] on weekends  Lost some wt    Cardio is Dr Tavares Allan  Did see Dr Sandra Jerez in past with holter  Used to be on flecainide  Has no planned f/u with Dr Sandra Jerez  Had sleep study in past, neg    The following portions of the patient's history were reviewed and updated as appropriate: allergies, current medications, past family history, past medical history, past social history, past surgical history and problem list     Review of Systems   Constitutional: Negative for unexpected weight change  Respiratory: Negative for shortness of breath            Current Outpatient Medications   Medication Sig Dispense Refill    atorvastatin (LIPITOR) 20 mg tablet Take 1 tablet by mouth once daily 90 tablet 0    Magnesium 500 MG CAPS Take by mouth      Multiple Vitamin (MULTI VITAMIN DAILY PO) Take 1 tablet by mouth daily      nebivolol (BYSTOLIC) 10 mg tablet Take 1 tablet (10 mg total) by mouth daily 90 tablet 3    Omega-3 Fatty Acids (FISH OIL) 1,000 mg Take 1 capsule (1,000 mg total) by mouth 2 (two) times a day (Patient taking differently: Take 1,000 mg by mouth daily ) 60 capsule 0     No current facility-administered medications for this visit  Objective:    /80   Pulse (!) 52   Temp 98 1 °F (36 7 °C)   Resp 20   Ht 6' 2" (1 88 m)   Wt 127 kg (281 lb)   SpO2 97%   BMI 36 08 kg/m²        Physical Exam  Vitals and nursing note reviewed  Constitutional:       Appearance: He is well-developed  He is obese  He is not ill-appearing  HENT:      Head: Normocephalic  Right Ear: Tympanic membrane normal       Left Ear: Tympanic membrane normal    Eyes:      General: No scleral icterus  Conjunctiva/sclera: Conjunctivae normal    Cardiovascular:      Rate and Rhythm: Normal rate and regular rhythm  Heart sounds: No murmur heard  Pulmonary:      Effort: Pulmonary effort is normal  No respiratory distress  Breath sounds: No wheezing  Abdominal:      Palpations: Abdomen is soft  Tenderness: There is no abdominal tenderness  Hernia: No hernia is present  Genitourinary:     Penis: Normal        Testes: Normal    Musculoskeletal:         General: No deformity  Cervical back: Neck supple  Skin:     General: Skin is warm and dry  Neurological:      Mental Status: He is alert  Motor: No weakness  Gait: Gait normal    Psychiatric:         Mood and Affect: Mood normal          Behavior: Behavior normal          Thought Content: Thought content normal        BMI Counseling: Body mass index is 36 08 kg/m²  The BMI is above normal  Nutrition recommendations include decreasing portion sizes and moderation in carbohydrate intake  Exercise recommendations include exercising 3-5 times per week  No pharmacotherapy was ordered  Rationale for BMI follow-up plan is due to patient being overweight or obese       Depression Screening and Follow-up Plan:   Patient was screened for depression during today's encounter  They screened negative with a PHQ-2 score of 0               Celina Puentes,

## 2021-10-01 LAB
ALBUMIN SERPL-MCNC: 4.8 G/DL (ref 4–5)
ALBUMIN/GLOB SERPL: 2.4 {RATIO} (ref 1.2–2.2)
ALP SERPL-CCNC: 56 IU/L (ref 44–121)
ALT SERPL-CCNC: 30 IU/L (ref 0–44)
AST SERPL-CCNC: 28 IU/L (ref 0–40)
BILIRUB SERPL-MCNC: 1.2 MG/DL (ref 0–1.2)
BUN SERPL-MCNC: 20 MG/DL (ref 6–24)
BUN/CREAT SERPL: 20 (ref 9–20)
CALCIUM SERPL-MCNC: 10 MG/DL (ref 8.7–10.2)
CHLORIDE SERPL-SCNC: 103 MMOL/L (ref 96–106)
CO2 SERPL-SCNC: 26 MMOL/L (ref 20–29)
CREAT SERPL-MCNC: 1.02 MG/DL (ref 0.76–1.27)
GLOBULIN SER-MCNC: 2 G/DL (ref 1.5–4.5)
GLUCOSE SERPL-MCNC: 89 MG/DL (ref 65–99)
HBA1C MFR BLD: 5.2 % (ref 4.8–5.6)
POTASSIUM SERPL-SCNC: 4 MMOL/L (ref 3.5–5.2)
PROT SERPL-MCNC: 6.8 G/DL (ref 6–8.5)
SL AMB EGFR AFRICAN AMERICAN: 102 ML/MIN/1.73
SL AMB EGFR NON AFRICAN AMERICAN: 88 ML/MIN/1.73
SODIUM SERPL-SCNC: 141 MMOL/L (ref 134–144)

## 2021-10-29 DIAGNOSIS — E78.2 MIXED HYPERLIPIDEMIA: ICD-10-CM

## 2021-11-01 RX ORDER — ATORVASTATIN CALCIUM 20 MG/1
TABLET, FILM COATED ORAL
Qty: 90 TABLET | Refills: 0 | Status: SHIPPED | OUTPATIENT
Start: 2021-11-01 | End: 2022-03-11

## 2022-03-11 DIAGNOSIS — E78.2 MIXED HYPERLIPIDEMIA: ICD-10-CM

## 2022-03-11 DIAGNOSIS — I10 BENIGN ESSENTIAL HYPERTENSION: ICD-10-CM

## 2022-03-11 RX ORDER — ATORVASTATIN CALCIUM 20 MG/1
TABLET, FILM COATED ORAL
Qty: 90 TABLET | Refills: 0 | Status: SHIPPED | OUTPATIENT
Start: 2022-03-11

## 2022-03-11 RX ORDER — NEBIVOLOL 10 MG/1
10 TABLET ORAL DAILY
Qty: 90 TABLET | Refills: 3 | Status: SHIPPED | OUTPATIENT
Start: 2022-03-11

## 2022-06-06 ENCOUNTER — APPOINTMENT (EMERGENCY)
Dept: RADIOLOGY | Facility: HOSPITAL | Age: 47
End: 2022-06-06
Payer: COMMERCIAL

## 2022-06-06 ENCOUNTER — HOSPITAL ENCOUNTER (EMERGENCY)
Facility: HOSPITAL | Age: 47
Discharge: HOME | End: 2022-06-06
Attending: EMERGENCY MEDICINE | Admitting: EMERGENCY MEDICINE
Payer: COMMERCIAL

## 2022-06-06 VITALS
HEART RATE: 62 BPM | DIASTOLIC BLOOD PRESSURE: 88 MMHG | SYSTOLIC BLOOD PRESSURE: 143 MMHG | TEMPERATURE: 97.3 F | HEIGHT: 75 IN | OXYGEN SATURATION: 98 % | BODY MASS INDEX: 34.82 KG/M2 | WEIGHT: 280 LBS | RESPIRATION RATE: 20 BRPM

## 2022-06-06 DIAGNOSIS — I48.91 ATRIAL FIBRILLATION WITH RAPID VENTRICULAR RESPONSE (HCC): Primary | ICD-10-CM

## 2022-06-06 LAB
ALBUMIN SERPL BCP-MCNC: 4.7 G/DL (ref 3.5–5)
ALP SERPL-CCNC: 64 U/L (ref 46–116)
ALT SERPL W P-5'-P-CCNC: 60 U/L (ref 12–78)
ANION GAP SERPL CALCULATED.3IONS-SCNC: 13 MMOL/L (ref 4–13)
AST SERPL W P-5'-P-CCNC: 50 U/L (ref 5–45)
BASOPHILS # BLD AUTO: 0.08 THOUSANDS/ΜL (ref 0–0.1)
BASOPHILS NFR BLD AUTO: 1 % (ref 0–1)
BILIRUB SERPL-MCNC: 1.82 MG/DL (ref 0.2–1)
BUN SERPL-MCNC: 26 MG/DL (ref 5–25)
CALCIUM SERPL-MCNC: 10.2 MG/DL (ref 8.3–10.1)
CARDIAC TROPONIN I PNL SERPL HS: 11 NG/L
CHLORIDE SERPL-SCNC: 107 MMOL/L (ref 100–108)
CO2 SERPL-SCNC: 24 MMOL/L (ref 21–32)
CREAT SERPL-MCNC: 1.37 MG/DL (ref 0.6–1.3)
EOSINOPHIL # BLD AUTO: 0.09 THOUSAND/ΜL (ref 0–0.61)
EOSINOPHIL NFR BLD AUTO: 1 % (ref 0–6)
ERYTHROCYTE [DISTWIDTH] IN BLOOD BY AUTOMATED COUNT: 12.3 % (ref 11.6–15.1)
GFR SERPL CREATININE-BSD FRML MDRD: 61 ML/MIN/1.73SQ M
GLUCOSE SERPL-MCNC: 110 MG/DL (ref 65–140)
HCT VFR BLD AUTO: 44.4 % (ref 36.5–49.3)
HGB BLD-MCNC: 15.1 G/DL (ref 12–17)
IMM GRANULOCYTES # BLD AUTO: 0 THOUSAND/UL (ref 0–0.2)
IMM GRANULOCYTES NFR BLD AUTO: 0 % (ref 0–2)
LYMPHOCYTES # BLD AUTO: 2.78 THOUSANDS/ΜL (ref 0.6–4.47)
LYMPHOCYTES NFR BLD AUTO: 38 % (ref 14–44)
MAGNESIUM SERPL-MCNC: 2 MG/DL (ref 1.6–2.6)
MCH RBC QN AUTO: 30.9 PG (ref 26.8–34.3)
MCHC RBC AUTO-ENTMCNC: 34 G/DL (ref 31.4–37.4)
MCV RBC AUTO: 91 FL (ref 82–98)
MONOCYTES # BLD AUTO: 0.67 THOUSAND/ΜL (ref 0.17–1.22)
MONOCYTES NFR BLD AUTO: 9 % (ref 4–12)
NEUTROPHILS # BLD AUTO: 3.77 THOUSANDS/ΜL (ref 1.85–7.62)
NEUTS SEG NFR BLD AUTO: 51 % (ref 43–75)
NRBC BLD AUTO-RTO: 0 /100 WBCS
PHOSPHATE SERPL-MCNC: 2.1 MG/DL (ref 2.7–4.5)
PLATELET # BLD AUTO: 171 THOUSANDS/UL (ref 149–390)
PMV BLD AUTO: 11.4 FL (ref 8.9–12.7)
POTASSIUM SERPL-SCNC: 3.7 MMOL/L (ref 3.5–5.3)
PROT SERPL-MCNC: 7.5 G/DL (ref 6.4–8.2)
RBC # BLD AUTO: 4.88 MILLION/UL (ref 3.88–5.62)
SODIUM SERPL-SCNC: 144 MMOL/L (ref 136–145)
TSH SERPL DL<=0.05 MIU/L-ACNC: 1.25 UIU/ML (ref 0.45–4.5)
WBC # BLD AUTO: 7.39 THOUSAND/UL (ref 4.31–10.16)

## 2022-06-06 PROCEDURE — 83735 ASSAY OF MAGNESIUM: CPT | Performed by: EMERGENCY MEDICINE

## 2022-06-06 PROCEDURE — 99285 EMERGENCY DEPT VISIT HI MDM: CPT

## 2022-06-06 PROCEDURE — 71045 X-RAY EXAM CHEST 1 VIEW: CPT

## 2022-06-06 PROCEDURE — 84443 ASSAY THYROID STIM HORMONE: CPT | Performed by: EMERGENCY MEDICINE

## 2022-06-06 PROCEDURE — 85025 COMPLETE CBC W/AUTO DIFF WBC: CPT | Performed by: EMERGENCY MEDICINE

## 2022-06-06 PROCEDURE — 36415 COLL VENOUS BLD VENIPUNCTURE: CPT | Performed by: EMERGENCY MEDICINE

## 2022-06-06 PROCEDURE — 84100 ASSAY OF PHOSPHORUS: CPT | Performed by: EMERGENCY MEDICINE

## 2022-06-06 PROCEDURE — 96360 HYDRATION IV INFUSION INIT: CPT

## 2022-06-06 PROCEDURE — 84484 ASSAY OF TROPONIN QUANT: CPT | Performed by: EMERGENCY MEDICINE

## 2022-06-06 PROCEDURE — 80053 COMPREHEN METABOLIC PANEL: CPT | Performed by: EMERGENCY MEDICINE

## 2022-06-06 PROCEDURE — 93005 ELECTROCARDIOGRAM TRACING: CPT

## 2022-06-06 PROCEDURE — 99291 CRITICAL CARE FIRST HOUR: CPT | Performed by: EMERGENCY MEDICINE

## 2022-06-06 RX ORDER — AMIODARONE HYDROCHLORIDE 50 MG/ML
INJECTION, SOLUTION INTRAVENOUS
Status: COMPLETED
Start: 2022-06-06 | End: 2022-06-06

## 2022-06-06 RX ORDER — METOPROLOL TARTRATE 5 MG/5ML
10 INJECTION INTRAVENOUS ONCE
Status: DISCONTINUED | OUTPATIENT
Start: 2022-06-06 | End: 2022-06-06

## 2022-06-06 RX ADMIN — SODIUM CHLORIDE 1000 ML: 0.9 INJECTION, SOLUTION INTRAVENOUS at 14:04

## 2022-06-06 RX ADMIN — AMIODARONE HYDROCHLORIDE 150 MG: 50 INJECTION, SOLUTION INTRAVENOUS at 14:23

## 2022-06-06 NOTE — ED PROVIDER NOTES
History  Chief Complaint   Patient presents with    Atrial Fibrillation     States he was working outside building a deck when he felt heart starting to race  Hx of afib but has not had an episode in about 2 years     HPI     56 yo M presents to ed for eval of palpitations  Hx of afib, last episode 2 years ago, taken off meds by Dr Maureen Wolf per patient  Patient states he noticed exactly one hour ago, palpitations that began  He knew he had afib, so presented to ER  Patient has been working outside all day and feels dehydrated  No chest pain  No sob  No other complaints at this time aside from palpitation  [id number] Score   CHF [0]   HTN [1]   Vascular disease (hx of MI, PAD, aortic plaque) [0]   DM [0]   Hx of CVA or TIA = 2 points [0]   Female [0]   Age: 74-69 =1 point, 76 or older = 2 points [0]     Total: 1       Prior to Admission Medications   Prescriptions Last Dose Informant Patient Reported? Taking?    Magnesium 500 MG CAPS  Self Yes No   Sig: Take by mouth   Multiple Vitamin (MULTI VITAMIN DAILY PO)  Self Yes No   Sig: Take 1 tablet by mouth daily   Omega-3 Fatty Acids (FISH OIL) 1,000 mg  Self No No   Sig: Take 1 capsule (1,000 mg total) by mouth 2 (two) times a day   Patient taking differently: Take 1,000 mg by mouth daily    atorvastatin (LIPITOR) 20 mg tablet   No No   Sig: Take 1 tablet by mouth once daily   nebivolol (BYSTOLIC) 10 mg tablet   No No   Sig: Take 1 tablet (10 mg total) by mouth daily      Facility-Administered Medications: None       Past Medical History:   Diagnosis Date    Atrial fibrillation (HCC)     Benign essential hypertension     BMI 38 0-38 9,adult 08/29/2016    Resolved:  September 22, 2017    Finger deformity, left 02/16/2016    Resolved:  September 22, 2017    Fracture of finger, left, closed 05/26/2016    Resolved:  September 22, 2017    Fracture of proximal phalanx of lesser toe of left foot 08/29/2016    Initial encounter:  Resolved:  September 22, 2017    H/O nausea and vomiting     H/O vitamin D deficiency     Hyperlipidemia     Hypertension     Mixed hyperlipidemia     Neoplasm of uncertain behavior of skin 08/02/2012    Resolved:  February 16, 2016    Obesity 02/16/2016    Resolved:  January 16, 2018    Overweight 02/2014    Resolved:  February 16, 2016    Sebaceous cyst 11/15/2010       Past Surgical History:   Procedure Laterality Date    WRIST SURGERY         Family History   Problem Relation Age of Onset    Arthritis Mother     Hyperlipidemia Mother     Hypertension Father     Prostate cancer Father     Cancer Family     Diabetes Family     Heart disease Family     Other Family         hyperlipoprotein     I have reviewed and agree with the history as documented  E-Cigarette/Vaping    E-Cigarette Use Never User      E-Cigarette/Vaping Substances    Nicotine No     THC No     CBD No     Flavoring No     Other No     Unknown No      Social History     Tobacco Use    Smoking status: Never Smoker    Smokeless tobacco: Never Used   Vaping Use    Vaping Use: Never used   Substance Use Topics    Alcohol use: Yes     Comment: Occ - Social drinker per [employer]    Drug use: No       Review of Systems   Constitutional: Negative for chills, fatigue and fever  HENT: Negative for nosebleeds and sore throat  Eyes: Negative for redness and visual disturbance  Respiratory: Negative for shortness of breath and wheezing  Cardiovascular: Positive for palpitations  Negative for chest pain  Gastrointestinal: Negative for abdominal pain and diarrhea  Endocrine: Negative for polyuria  Genitourinary: Negative for difficulty urinating and testicular pain  Musculoskeletal: Negative for back pain and neck stiffness  Skin: Negative for rash and wound  Neurological: Negative for seizures, speech difficulty and headaches  Psychiatric/Behavioral: Negative for dysphoric mood and hallucinations     All other systems reviewed and are negative  Physical Exam  Physical Exam  Vitals and nursing note reviewed  Constitutional:       Appearance: He is well-developed  HENT:      Head: Normocephalic and atraumatic  Right Ear: External ear normal       Left Ear: External ear normal    Eyes:      Conjunctiva/sclera: Conjunctivae normal    Cardiovascular:      Rate and Rhythm: Normal rate and regular rhythm  Heart sounds: Normal heart sounds  Pulmonary:      Effort: Pulmonary effort is normal       Breath sounds: Normal breath sounds  No wheezing  Chest:      Chest wall: No tenderness  Abdominal:      General: Bowel sounds are normal       Palpations: Abdomen is soft  Tenderness: There is no abdominal tenderness  There is no guarding  Musculoskeletal:         General: Normal range of motion  Cervical back: Normal range of motion  Skin:     General: Skin is warm and dry  Findings: No rash  Neurological:      Mental Status: He is alert and oriented to person, place, and time  Cranial Nerves: No cranial nerve deficit  Sensory: No sensory deficit  Motor: No abnormal muscle tone        Coordination: Coordination normal          Vital Signs  ED Triage Vitals   Temperature Pulse Respirations Blood Pressure SpO2   06/06/22 1342 06/06/22 1342 06/06/22 1342 06/06/22 1342 06/06/22 1342   (!) 97 3 °F (36 3 °C) (!) 143 20 141/82 98 %      Temp src Heart Rate Source Patient Position - Orthostatic VS BP Location FiO2 (%)   -- 06/06/22 1515 -- -- --    Monitor         Pain Score       06/06/22 1342       No Pain           Vitals:    06/06/22 1342 06/06/22 1430 06/06/22 1515 06/06/22 1530   BP: 141/82 128/81 149/91 143/88   Pulse: (!) 143 (!) 119 62 62         Visual Acuity      ED Medications  Medications   sodium chloride 0 9 % bolus 1,000 mL (0 mL Intravenous Stopped 6/6/22 1503)   amiodarone 150 mg/3 mL injection **ADS Override Pull** (150 mg  Given 6/6/22 1423)       Diagnostic Studies  Results Reviewed Procedure Component Value Units Date/Time    TSH, 3rd generation with Free T4 reflex [[id number]]  (Normal) Collected: 06/06/22 1348    Lab Status: Final result Specimen: Blood from Arm, Right Updated: 06/06/22 1419     TSH 3RD GENERATON 1 248 uIU/mL     Narrative:      Patients undergoing fluorescein dye angiography may retain small amounts of fluorescein in the body for 48-72 hours post procedure  Samples containing fluorescein can produce falsely depressed TSH values  If the patient had this procedure,a specimen should be resubmitted post fluorescein clearance        Magnesium [[id number]]  (Normal) Collected: 06/06/22 1348    Lab Status: Final result Specimen: Blood from Arm, Right Updated: 06/06/22 1419     Magnesium 2 0 mg/dL     Phosphorus [[id number]]  (Abnormal) Collected: 06/06/22 1348    Lab Status: Final result Specimen: Blood from Arm, Right Updated: 06/06/22 1419     Phosphorus 2 1 mg/dL     HS Troponin 0hr (reflex protocol) [[id number]]  (Normal) Collected: 06/06/22 1348    Lab Status: Final result Specimen: Blood from Arm, Right Updated: 06/06/22 1417     hs TnI 0hr 11 ng/L     Comprehensive metabolic panel [[id number]]  (Abnormal) Collected: 06/06/22 1348    Lab Status: Final result Specimen: Blood from Arm, Right Updated: 06/06/22 1410     Sodium 144 mmol/L      Potassium 3 7 mmol/L      Chloride 107 mmol/L      CO2 24 mmol/L      ANION GAP 13 mmol/L      BUN 26 mg/dL      Creatinine 1 37 mg/dL      Glucose 110 mg/dL      Calcium 10 2 mg/dL      AST 50 U/L      ALT 60 U/L      Alkaline Phosphatase 64 U/L      Total Protein 7 5 g/dL      Albumin 4 7 g/dL      Total Bilirubin 1 82 mg/dL      eGFR 61 ml/min/1 73sq m     Narrative:      [place] guidelines for Chronic Kidney Disease (CKD):     Stage 1 with normal or high GFR (GFR > 90 mL/min/1 73 square meters)    Stage 2 Mild CKD (GFR = 60-89 mL/min/1 73 square meters)    Stage 3A Moderate CKD (GFR = 45-59 mL/min/1 73 square meters)    Stage 3B Moderate CKD (GFR = 30-44 mL/min/1 73 square meters)    Stage 4 Severe CKD (GFR = 15-29 mL/min/1 73 square meters)    Stage 5 End Stage CKD (GFR <15 mL/min/1 73 square meters)  Note: GFR calculation is accurate only with a steady state creatinine    CBC and differential [[id number]] Collected: 06/06/22 1348    Lab Status: Final result Specimen: Blood from Arm, Right Updated: 06/06/22 1354     WBC 7 39 Thousand/uL      RBC 4 88 Million/uL      Hemoglobin 15 1 g/dL      Hematocrit 44 4 %      MCV 91 fL      MCH 30 9 pg      MCHC 34 0 g/dL      RDW 12 3 %      MPV 11 4 fL      Platelets 849 Thousands/uL      nRBC 0 /100 WBCs      Neutrophils Relative 51 %      Immat GRANS % 0 %      Lymphocytes Relative 38 %      Monocytes Relative 9 %      Eosinophils Relative 1 %      Basophils Relative 1 %      Neutrophils Absolute 3 77 Thousands/µL      Immature Grans Absolute 0 00 Thousand/uL      Lymphocytes Absolute 2 78 Thousands/µL      Monocytes Absolute 0 67 Thousand/µL      Eosinophils Absolute 0 09 Thousand/µL      Basophils Absolute 0 08 Thousands/µL                  XR chest 1 view portable   Final Result by Tim Tse MD (06/06 1643)      No acute cardiopulmonary disease                    Workstation performed: [id number]                    Procedures  CriticalCare Time  Performed by: Nish Pacheco MD  Authorized by: Nish Pacheco MD     Critical care provider statement:     Critical care time (minutes):  34    Critical care start time:  6/6/2022 2:00 PM    Critical care end time:  6/6/2022 2:34 PM    Critical care time was exclusive of:  Separately billable procedures and treating other patients and teaching time    Critical care was necessary to treat or prevent imminent or life-threatening deterioration of the following conditions:  Circulatory failure    Critical care was time spent personally by me on the following activities:  Obtaining history from patient or surrogate, ordering and performing treatments and interventions, development of treatment plan with patient or surrogate, ordering and review of laboratory studies, ordering and review of radiographic studies, re-evaluation of patient's condition, evaluation of patient's response to treatment, review of old charts and examination of patient  Comments:      afib with rvr, patient required administration of amiodarone, rehydration and monitoring of vital signs    ECG 12 Lead Documentation Only    Date/Time: 6/6/2022 1:42 PM  Performed by: Yarelis Mims MD  Authorized by: Yarelis Mims MD     Comments:      ECG 12 Lead Documentation  Date/Time: today/date: 6/6/2022  Performed by: Yvette Menon  Authorized by: Yvette Menon    ECG reviewed by me, the ED Provider: yes    Patient location:  ED   Previous ECG:  Sinus erik  Rate:  ECG rate assessment: 143   Rhythm: Atrial Fibrillation   Ectopy:  : none    QRS axis:  Normal  Intervals: normal   Q waves: None   ST segments:  no acute ST changes  T waves: normal      Impression: Atrial Fibrillation       ECG 12 Lead Documentation Only    Date/Time: 6/6/2022 3:35 PM  Performed by: Yarelis Mims MD  Authorized by: Yarelis Mims MD     Comments:      ECG 12 Lead Documentation  Date/Time: today/date: 6/6/2022  Performed by: Yvette Menon  Authorized by: Yvette Menon    ECG reviewed by me, the ED Provider: yes    Patient location:  ED   Previous ECG: If available: Afib  Rate:  ECG rate assessment: normal    Rhythm: sinus rhythm    Ectopy: none    QRS axis:  Normal  Intervals: normal   Q waves: None   ST segments:  No acute ST changes  T waves: normal      Impression: Normal Sinus EKG               ED Course  ED Course as of 06/06/22 2256   Mon Jun 06, 2022   1533 Spoke to Dr Livingston [place] cardi[facility], follow up in the office  SBIRT 20yo+    Flowsheet Row Most Recent Value   SBIRT (23 yo +)    In order to provide better care to our patients, we are screening all of our patients for alcohol and drug use   Would it be okay to ask you these screening questions? No Filed at: 06/06/2022 1345                    [facility]     afib with RVR, patient has had symptoms for past one hour, < 48 hours  No need for anti coag, chadsvasc 1  Converted after amio administration  Patient case discussed with cardiology see ed course  Follow up in office  The patient was instructed to follow up as documented  Strict return precautions were discussed with the patient and the patient was instructed to return to the emergency department immediately if symptoms worsen  The patient/patient family member acknowledged and were in agreement with plan  Disposition  Final diagnoses:   Atrial fibrillation with rapid ventricular response ([address] )     Time reflects when diagnosis was documented in both MDM as applicable and the Disposition within this note     Time User Action Codes Description Comment    6/6/2022  3:33 PM Tisha Smoke Add [I48 [id number]] Atrial fibrillation with rapid ventricular response [facility])       ED Disposition     ED Disposition   Discharge    Condition   Stable    Date/Time   Mon Jun 6, 2022  3:33 PM    [address] discharge to home/self care                 Follow-up Information     Follow up With Specialties Details Why Contact Brittani Campos,  Cardiology Schedule an appointment as soon as possible for a visit in 1 day For follow up regarding your symptoms and recheck [address] [facility]  [phone]            Discharge Medication List as of 6/6/2022  3:42 PM      CONTINUE these medications which have NOT CHANGED    Details   atorvastatin (LIPITOR) 20 mg tablet Take 1 tablet by mouth once daily, Normal      Magnesium 500 MG CAPS Take by mouth, Starting Fri 9/22/2017, Historical Med      Multiple Vitamin (MULTI VITAMIN DAILY PO) Take 1 tablet by mouth daily, Starting Fri 9/22/2017, Historical Med      nebivolol (BYSTOLIC) 10 mg tablet Take 1 tablet (10 mg total) by mouth daily, Starting Fri 3/11/2022, Normal      Omega-3 Fatty Acids (FISH OIL) 1,000 mg Take 1 capsule (1,000 mg total) by mouth 2 (two) times a day, Starting Thu 8/9/2018, No Print             No discharge procedures on file      PDMP Review     None          ED Provider  Electronically Signed by           Quita Dandy, MD  06/06/22 0690

## 2022-06-07 LAB
ATRIAL RATE: 159 BPM
ATRIAL RATE: 64 BPM
P AXIS: 39 DEGREES
PR INTERVAL: 192 MS
QRS AXIS: 0 DEGREES
QRS AXIS: 19 DEGREES
QRSD INTERVAL: 82 MS
QRSD INTERVAL: 94 MS
QT INTERVAL: 302 MS
QT INTERVAL: 432 MS
QTC INTERVAL: 445 MS
QTC INTERVAL: 466 MS
T WAVE AXIS: 21 DEGREES
T WAVE AXIS: 38 DEGREES
VENTRICULAR RATE: 143 BPM
VENTRICULAR RATE: 64 BPM

## 2022-06-07 PROCEDURE — 93010 ELECTROCARDIOGRAM REPORT: CPT | Performed by: INTERNAL MEDICINE

## 2022-10-12 PROBLEM — Z00.00 HEALTHCARE MAINTENANCE: Status: RESOLVED | Noted: 2021-09-23 | Resolved: 2022-10-12

## 2022-11-03 DIAGNOSIS — E78.2 MIXED HYPERLIPIDEMIA: ICD-10-CM

## 2022-11-03 NOTE — TELEPHONE ENCOUNTER
Patient is due for CPE  Not seen in over one year  Return in about 6 months (around 3/23/2022) for Recheck with Dr Timo Ramirez, Artis Savageroselyn Mathias MA

## 2022-11-04 RX ORDER — ATORVASTATIN CALCIUM 20 MG/1
TABLET, FILM COATED ORAL
Qty: 90 TABLET | Refills: 0 | Status: SHIPPED | OUTPATIENT
Start: 2022-11-04

## 2023-01-09 ENCOUNTER — TELEPHONE (OUTPATIENT)
Dept: FAMILY MEDICINE CLINIC | Facility: CLINIC | Age: 48
End: 2023-01-09

## 2023-01-09 DIAGNOSIS — Z12.5 SCREENING FOR PROSTATE CANCER: ICD-10-CM

## 2023-01-09 DIAGNOSIS — Z13.6 SCREENING FOR CARDIOVASCULAR CONDITION: Primary | ICD-10-CM

## 2023-01-09 NOTE — TELEPHONE ENCOUNTER
Patient has appointment this Friday 1/13 for a CPE and would like to go for his blood work prior  Can you please order routine lab work for patient

## 2023-01-11 LAB
ALBUMIN SERPL-MCNC: 4.9 G/DL (ref 4–5)
ALBUMIN/GLOB SERPL: 2.2 {RATIO} (ref 1.2–2.2)
ALP SERPL-CCNC: 57 IU/L (ref 44–121)
ALT SERPL-CCNC: 43 IU/L (ref 0–44)
AST SERPL-CCNC: 32 IU/L (ref 0–40)
BILIRUB SERPL-MCNC: 1.1 MG/DL (ref 0–1.2)
BUN SERPL-MCNC: 15 MG/DL (ref 6–24)
BUN/CREAT SERPL: 16 (ref 9–20)
CALCIUM SERPL-MCNC: 10.4 MG/DL (ref 8.7–10.2)
CHLORIDE SERPL-SCNC: 103 MMOL/L (ref 96–106)
CHOLEST SERPL-MCNC: 190 MG/DL (ref 100–199)
CO2 SERPL-SCNC: 24 MMOL/L (ref 20–29)
CREAT SERPL-MCNC: 0.92 MG/DL (ref 0.76–1.27)
EGFR: 103 ML/MIN/1.73
GLOBULIN SER-MCNC: 2.2 G/DL (ref 1.5–4.5)
GLUCOSE SERPL-MCNC: 104 MG/DL (ref 70–99)
HDLC SERPL-MCNC: 51 MG/DL
LDLC SERPL CALC-MCNC: 111 MG/DL (ref 0–99)
MICRODELETION SYND BLD/T FISH: NORMAL
POTASSIUM SERPL-SCNC: 4.2 MMOL/L (ref 3.5–5.2)
PROT SERPL-MCNC: 7.1 G/DL (ref 6–8.5)
PSA SERPL-MCNC: 0.4 NG/ML (ref 0–4)
SODIUM SERPL-SCNC: 142 MMOL/L (ref 134–144)
TRIGL SERPL-MCNC: 161 MG/DL (ref 0–149)

## 2023-01-13 ENCOUNTER — OFFICE VISIT (OUTPATIENT)
Dept: FAMILY MEDICINE CLINIC | Facility: CLINIC | Age: 48
End: 2023-01-13

## 2023-01-13 VITALS
HEIGHT: 75 IN | WEIGHT: 298 LBS | RESPIRATION RATE: 16 BRPM | DIASTOLIC BLOOD PRESSURE: 90 MMHG | OXYGEN SATURATION: 97 % | BODY MASS INDEX: 37.05 KG/M2 | HEART RATE: 56 BPM | SYSTOLIC BLOOD PRESSURE: 148 MMHG | TEMPERATURE: 96.8 F

## 2023-01-13 DIAGNOSIS — E78.2 MIXED HYPERLIPIDEMIA: ICD-10-CM

## 2023-01-13 DIAGNOSIS — Z12.11 SCREEN FOR COLON CANCER: ICD-10-CM

## 2023-01-13 DIAGNOSIS — I48.0 PAROXYSMAL ATRIAL FIBRILLATION (HCC): ICD-10-CM

## 2023-01-13 DIAGNOSIS — R73.03 PREDIABETES: ICD-10-CM

## 2023-01-13 DIAGNOSIS — Z11.59 NEED FOR HEPATITIS C SCREENING TEST: ICD-10-CM

## 2023-01-13 DIAGNOSIS — Z00.00 WELL ADULT EXAM: Primary | ICD-10-CM

## 2023-01-13 DIAGNOSIS — I10 BENIGN ESSENTIAL HYPERTENSION: ICD-10-CM

## 2023-01-13 PROBLEM — F41.9 ANXIETY: Status: RESOLVED | Noted: 2019-08-06 | Resolved: 2023-01-13

## 2023-01-13 RX ORDER — AMLODIPINE BESYLATE 2.5 MG/1
2.5 TABLET ORAL DAILY
Qty: 90 TABLET | Refills: 3 | Status: SHIPPED | OUTPATIENT
Start: 2023-01-13

## 2023-01-13 RX ORDER — ATORVASTATIN CALCIUM 20 MG/1
20 TABLET, FILM COATED ORAL DAILY
Qty: 90 TABLET | Refills: 1 | Status: SHIPPED | OUTPATIENT
Start: 2023-01-13

## 2023-01-13 NOTE — PROGRESS NOTES
FAMILY PRACTICE HEALTH MAINTENANCE OFFICE VISIT  [facility] - [facility]    NAME: Ruba Mckeon  AGE: 52 y o  SEX: male  : 1975     DATE: 2023    Assessment and Plan     1  Well adult exam    2  Benign essential hypertension  -     amLODIPine (NORVASC) 2 5 mg tablet; Take 1 tablet (2 5 mg total) by mouth daily    3  Mixed hyperlipidemia  -     atorvastatin (LIPITOR) 20 mg tablet; Take 1 tablet (20 mg total) by mouth daily  -     Comprehensive metabolic panel; Future; Expected date: 2023  -     Lipid Panel with Direct LDL reflex; Future; Expected date: 2023    4  Paroxysmal atrial fibrillation (HCC)    5  Prediabetes  -     Hemoglobin A1C; Future; Expected date: 2023    6  Screen for colon cancer  -     Ambulatory referral for colonoscopy; Future    7  Need for hepatitis C screening test  -     Hepatitis C antibody; Future      Patient Counseling:   Nutrition: Stressed importance of a well balanced diet, moderation of sodium/saturated fat, caloric balance and sufficient intake of fiber  Exercise: Stressed the importance of regular exercise with a goal of 150 minutes per week  Dental Health: Discussed daily flossing and brushing and regular dental visits     Immunizations reviewed: Declined recommended vaccinations  Discussed benefits of:  Colon Cancer Screening, Prostate Cancer Screening  and Screening labs  BMI Counseling: Body mass index is 37 25 kg/m²  Discussed with patient's BMI with him  The BMI is above normal  Nutrition recommendations include reducing portion sizes  Return in about 3 months (around 2023) for Recheck pressure and labs          Chief Complaint     Chief Complaint   Patient presents with   • Physical Exam     Karin David       History of Present Illness     Pt is here for a full physical  Pt had labs drawn    Pt states his weight has gobne up sopme  Pt was laid off was a little stressed, he is a stress eater  Pt states he has not been taking his lipitor and eating poorly      Well Adult Physical   Patient here for a comprehensive physical exam       Diet and Physical Activity  Diet: well balanced diet  Exercise: frequently      Depression Screen  PHQ-2/9 Depression Screening    Little interest or pleasure in doing things: 0 - not at all  Feeling down, depressed, or hopeless: 0 - not at all  PHQ-2 Score: 0  PHQ-2 Interpretation: Negative depression screen          General Health  Hearing: Normal:  bilateral  Vision: no vision problems  Dental: regular dental visits    Reproductive Health  No issues       The following portions of the patient's history were reviewed and updated as appropriate: allergies, current medications, past family history, past medical history, past social history, past surgical history and problem list     Review of Systems     Review of Systems   Constitutional: Negative for activity change, appetite change, chills, diaphoresis, fatigue, fever and unexpected weight change  HENT: Negative for congestion, dental problem, ear pain, mouth sores, sinus pressure, sinus pain, sore throat and trouble swallowing  Eyes: Negative for photophobia, discharge and itching  Respiratory: Negative for apnea, chest tightness and shortness of breath  Cardiovascular: Negative for chest pain, palpitations and leg swelling  Gastrointestinal: Negative for abdominal distention, abdominal pain, blood in stool, nausea and vomiting  Endocrine: Negative for cold intolerance, heat intolerance, polydipsia, polyphagia and polyuria  Genitourinary: Negative for difficulty urinating  Musculoskeletal: Negative for arthralgias  Skin: Negative for color change and wound  Neurological: Negative for dizziness, syncope, speech difficulty and headaches  Hematological: Negative for adenopathy  Psychiatric/Behavioral: Negative for agitation and behavioral problems         Past Medical History     Past Medical History:   Diagnosis Date • Atrial fibrillation ([facility] [address] )    • Benign essential hypertension    • BMI 38 0-38 9,adult 08/29/2016    Resolved:  September 22, 2017   • Finger deformity, left 02/16/2016    Resolved:  September 22, 2017   • Fracture of finger, left, closed 05/26/2016    Resolved:  September 22, 2017   • Fracture of proximal phalanx of lesser toe of left foot 08/29/2016    Initial encounter:  Resolved:  September 22, 2017   • H/O nausea and vomiting    • H/O vitamin D deficiency    • Hyperlipidemia    • Hypertension    • Mixed hyperlipidemia    • Neoplasm of uncertain behavior of skin 08/02/2012    Resolved:  February 16, 2016   • Obesity 02/16/2016    Resolved:  January 16, 2018   • Overweight 02/2014    Resolved:  February 16, 2016   • Sebaceous cyst 11/15/2010       Past Surgical History     Past Surgical History:   Procedure Laterality Date   • WRIST SURGERY         Social History     Social History     Socioeconomic History   • Marital status: Single     Spouse name: None   • Number of children: None   • Years of education: None   • Highest education level: None   Occupational History   • None   Tobacco Use   • Smoking status: Never   • Smokeless tobacco: Never   Vaping Use   • Vaping Use: Never used   Substance and Sexual Activity   • Alcohol use: Yes     Comment: Occ - Social drinker per [employer]   • Drug use: No   • Sexual activity: None   Other Topics Concern   • None   Social History Narrative   • None     Social Determinants of Health     Financial Resource Strain: Not on file   Food Insecurity: Not on file   Transportation Needs: Not on file   Physical Activity: Not on file   Stress: Not on file   Social Connections: Not on file   Intimate Partner Violence: Not on file   Housing Stability: Not on file       Family History     Family History   Problem Relation Age of Onset   • Arthritis Mother    • Hyperlipidemia Mother    • Hypertension Father    • Prostate cancer Father    • Cancer Family    • Diabetes Family    • Heart disease Family    • Other Family         hyperlipoprotein       Current Medications       Current Outpatient Medications:   •  amLODIPine (NORVASC) 2 5 mg tablet, Take 1 tablet (2 5 mg total) by mouth daily, Disp: 90 tablet, Rfl: 3  •  atorvastatin (LIPITOR) 20 mg tablet, Take 1 tablet (20 mg total) by mouth daily, Disp: 90 tablet, Rfl: 1  •  nebivolol (BYSTOLIC) 10 mg tablet, Take 1 tablet (10 mg total) by mouth daily, Disp: 90 tablet, Rfl: 3     Allergies     No Known Allergies    Objective     /90   Pulse 56   Temp (!) 96 8 °F (36 °C)   Resp 16   Ht 6' 3" (1 905 m)   Wt 135 kg (298 lb)   SpO2 97%   BMI 37 25 kg/m²      Physical Exam  Vitals and nursing note reviewed  Constitutional:       General: He is not in acute distress  Appearance: He is well-developed  He is not diaphoretic  HENT:      Head: Normocephalic and atraumatic  Right Ear: External ear normal       Left Ear: External ear normal       Nose: Nose normal       Mouth/Throat:      Pharynx: No oropharyngeal exudate  Eyes:      General: No scleral icterus  Right eye: No discharge  Left eye: No discharge  Pupils: Pupils are equal, round, and reactive to light  Neck:      Thyroid: No thyromegaly  Cardiovascular:      Rate and Rhythm: Normal rate  Heart sounds: Normal heart sounds  No murmur heard  Pulmonary:      Effort: Pulmonary effort is normal  No respiratory distress  Breath sounds: Normal breath sounds  No wheezing  Abdominal:      General: Bowel sounds are normal  There is no distension  Palpations: Abdomen is soft  There is no mass  Tenderness: There is no abdominal tenderness  There is no guarding or rebound  Musculoskeletal:         General: Normal range of motion  Skin:     General: Skin is warm and dry  Findings: No erythema or rash  Neurological:      Mental Status: He is alert        Coordination: Coordination normal       Deep Tendon Reflexes: Reflexes normal    Psychiatric:         Behavior: Behavior normal            Vision Screening    Right eye Left eye Both eyes   Without correction 20/20 20/15 20/13   With correction          Recent Results (from the past 672 hour(s))   Comprehensive metabolic panel    Collection Time: 01/10/23  7:14 AM   Result Value Ref Range    Glucose, Random 104 (H) 70 - 99 mg/dL    BUN 15 6 - 24 mg/dL    Creatinine 0 92 0 76 - 1 27 mg/dL    eGFR 103 >59 mL/min/1 73    SL AMB BUN/CREATININE RATIO 16 9 - 20    Sodium 142 134 - 144 mmol/L    Potassium 4 2 3 5 - 5 2 mmol/L    Chloride 103 96 - 106 mmol/L    CO2 24 20 - 29 mmol/L    CALCIUM 10 4 (H) 8 7 - 10 2 mg/dL    Protein, Total 7 1 6 0 - 8 5 g/dL    Albumin 4 9 4 0 - 5 0 g/dL    Globulin, Total 2 2 1 5 - 4 5 g/dL    Albumin/Globulin Ratio 2 2 1 2 - 2 2    TOTAL BILIRUBIN 1 1 0 0 - 1 2 mg/dL    Alk Phos Isoenzymes 57 44 - 121 IU/L    AST 32 0 - 40 IU/L    ALT 43 0 - 44 IU/L   Lipid panel    Collection Time: 01/10/23  7:14 AM   Result Value Ref Range    Cholesterol, Total 190 100 - 199 mg/dL    Triglycerides 161 (H) 0 - 149 mg/dL    HDL 51 >39 mg/dL    LDL Calculated 111 (H) 0 - 99 mg/dL   PSA Total, Diagnostic    Collection Time: 01/10/23  7:14 AM   Result Value Ref Range    Prostate Specific Antigen Total 0 4 0 0 - 4 0 ng/mL   Cardiovascular Report    Collection Time: 01/10/23  7:14 AM   Result Value Ref Range    Interpretation Note          DO IRAM Vogt [facility]

## 2023-01-27 ENCOUNTER — PREP FOR PROCEDURE (OUTPATIENT)
Dept: GASTROENTEROLOGY | Facility: CLINIC | Age: 48
End: 2023-01-27

## 2023-01-27 ENCOUNTER — TELEPHONE (OUTPATIENT)
Dept: GASTROENTEROLOGY | Facility: CLINIC | Age: 48
End: 2023-01-27

## 2023-01-27 DIAGNOSIS — Z12.11 SCREENING FOR COLON CANCER: Primary | ICD-10-CM

## 2023-01-27 NOTE — TELEPHONE ENCOUNTER
Scheduled date of colonoscopy (as of today):3/14/23    Physician performing colonoscopy:Dr Joe Mazariegos    Location of colonoscopy:[facility]    Clearances: N/A    Not vaccinated-needs covid test

## 2023-03-13 ENCOUNTER — ANESTHESIA (OUTPATIENT)
Dept: ANESTHESIOLOGY | Facility: HOSPITAL | Age: 48
End: 2023-03-13

## 2023-03-13 ENCOUNTER — ANESTHESIA EVENT (OUTPATIENT)
Dept: ANESTHESIOLOGY | Facility: HOSPITAL | Age: 48
End: 2023-03-13

## 2023-03-13 RX ORDER — SODIUM CHLORIDE, SODIUM LACTATE, POTASSIUM CHLORIDE, CALCIUM CHLORIDE 600; 310; 30; 20 MG/100ML; MG/100ML; MG/100ML; MG/100ML
75 INJECTION, SOLUTION INTRAVENOUS CONTINUOUS
Status: CANCELLED | OUTPATIENT
Start: 2023-03-13

## 2023-03-14 ENCOUNTER — HOSPITAL ENCOUNTER (OUTPATIENT)
Dept: GASTROENTEROLOGY | Facility: AMBULARY SURGERY CENTER | Age: 48
Setting detail: OUTPATIENT SURGERY
Discharge: HOME | End: 2023-03-14
Attending: INTERNAL MEDICINE

## 2023-03-14 ENCOUNTER — ANESTHESIA (OUTPATIENT)
Dept: GASTROENTEROLOGY | Facility: AMBULARY SURGERY CENTER | Age: 48
End: 2023-03-14

## 2023-03-14 ENCOUNTER — ANESTHESIA EVENT (OUTPATIENT)
Dept: GASTROENTEROLOGY | Facility: AMBULARY SURGERY CENTER | Age: 48
End: 2023-03-14

## 2023-03-14 VITALS
TEMPERATURE: 97.1 F | HEART RATE: 44 BPM | DIASTOLIC BLOOD PRESSURE: 64 MMHG | RESPIRATION RATE: 18 BRPM | SYSTOLIC BLOOD PRESSURE: 124 MMHG | OXYGEN SATURATION: 96 %

## 2023-03-14 DIAGNOSIS — Z12.11 SCREENING FOR COLON CANCER: ICD-10-CM

## 2023-03-14 RX ORDER — SODIUM CHLORIDE, SODIUM LACTATE, POTASSIUM CHLORIDE, CALCIUM CHLORIDE 600; 310; 30; 20 MG/100ML; MG/100ML; MG/100ML; MG/100ML
75 INJECTION, SOLUTION INTRAVENOUS CONTINUOUS
Status: DISCONTINUED | OUTPATIENT
Start: 2023-03-14 | End: 2023-03-18 | Stop reason: HOSPADM

## 2023-03-14 RX ORDER — PROPOFOL 10 MG/ML
INJECTION, EMULSION INTRAVENOUS AS NEEDED
Status: DISCONTINUED | OUTPATIENT
Start: 2023-03-14 | End: 2023-03-14

## 2023-03-14 RX ORDER — GLYCOPYRROLATE 0.2 MG/ML
INJECTION INTRAMUSCULAR; INTRAVENOUS AS NEEDED
Status: DISCONTINUED | OUTPATIENT
Start: 2023-03-14 | End: 2023-03-14

## 2023-03-14 RX ADMIN — PROPOFOL 50 MG: 10 INJECTION, EMULSION INTRAVENOUS at 07:42

## 2023-03-14 RX ADMIN — SODIUM CHLORIDE, SODIUM LACTATE, POTASSIUM CHLORIDE, AND CALCIUM CHLORIDE: .6; .31; .03; .02 INJECTION, SOLUTION INTRAVENOUS at 07:15

## 2023-03-14 RX ADMIN — PROPOFOL 50 MG: 10 INJECTION, EMULSION INTRAVENOUS at 07:51

## 2023-03-14 RX ADMIN — PROPOFOL 150 MG: 10 INJECTION, EMULSION INTRAVENOUS at 07:38

## 2023-03-14 RX ADMIN — PROPOFOL 50 MG: 10 INJECTION, EMULSION INTRAVENOUS at 07:46

## 2023-03-14 RX ADMIN — GLYCOPYRROLATE 0.1 MG: 0.2 INJECTION, SOLUTION INTRAMUSCULAR; INTRAVENOUS at 07:41

## 2023-03-14 RX ADMIN — GLYCOPYRROLATE 0.1 MG: 0.2 INJECTION, SOLUTION INTRAMUSCULAR; INTRAVENOUS at 07:37

## 2023-03-14 RX ADMIN — PROPOFOL 50 MG: 10 INJECTION, EMULSION INTRAVENOUS at 07:48

## 2023-03-14 NOTE — ANESTHESIA PREPROCEDURE EVALUATION
Procedure:  COLONOSCOPY    Relevant Problems   ANESTHESIA (within normal limits)   (-) History of anesthesia complications      CARDIO   (+) Benign essential hypertension   (+) Mixed hyperlipidemia   (+) PAF (paroxysmal atrial fibrillation) (HCC)      ENDO (within normal limits)      GI/HEPATIC   (+) Fatty liver disease, nonalcoholic      /RENAL (within normal limits)      HEMATOLOGY (within normal limits)      MUSCULOSKELETAL (within normal limits)      NEURO/PSYCH (within normal limits)      PULMONARY (within normal limits)      Other   (+) BMI 36 0-36 9,adult   (+) Prediabetes        Physical Exam    Airway    Mallampati score: II  TM Distance: >3 FB  Neck ROM: full     Dental   No notable dental hx     Cardiovascular  Rhythm: regular,     Pulmonary  Pulmonary exam normal Breath sounds clear to auscultation,     Other Findings        Anesthesia Plan  ASA Score- 3     Anesthesia Type- IV sedation with anesthesia with ASA Monitors  Additional Monitors:   Airway Plan:           Plan Factors-Exercise tolerance (METS): >4 METS  Chart reviewed  EKG reviewed  Imaging results reviewed  Existing labs reviewed  Patient summary reviewed  Patient is not a current smoker  Patient did not smoke on day of surgery  Induction- intravenous  Postoperative Plan-     Informed Consent- Anesthetic plan and risks discussed with patient  I personally reviewed this patient with the CRNA  Discussed and agreed on the Anesthesia Plan with the CRNA             NPO verified  NKDA  Plan:  IV sedation/MAC, GA as backup    Benefits and risks of sedation were discussed with the patient including possibility of recall under sedation and the potential for conversion to general anesthesia if necessary  All questions were answered  Anesthesia consent was obtained from the patient

## 2023-03-14 NOTE — ANESTHESIA POSTPROCEDURE EVALUATION
Post-Op Assessment Note    CV Status:  Stable    Pain management: adequate     Mental Status:  Alert and awake   Hydration Status:  Euvolemic   PONV Controlled:  Controlled   Airway Patency:  Patent      Post Op Vitals Reviewed: Yes      Staff: CRNA         No notable events documented      BP   130/70   Temp  98 7   Pulse 76   Resp   18   SpO2   99

## 2023-03-14 NOTE — H&P
History and Physical -  [facility]  Iban Segundo 52 y o  male MRN: [id number]        HPI: 80-year-old male with history of atrial fibrillation, hypertension was referred for screening colonoscopy  Regular bowel movements      Historical Information   Past Medical History:   Diagnosis Date   • Atrial fibrillation [facility])     summer 0f 2022 x1(dehydration caused a fib) episode-previously 2019 had afib   • Finger deformity, left 02/16/2016    Resolved:  September 22, 2017   • Fracture of finger, left, closed 05/26/2016    Resolved:  September 22, 2017   • Fracture of proximal phalanx of lesser toe of left foot 08/29/2016    Initial encounter:  Resolved:  September 22, 2017   • H/O vitamin D deficiency    • Hyperlipidemia    • Hypertension    • Mixed hyperlipidemia    • Neoplasm of uncertain behavior of skin 08/02/2012    Resolved:  February 16, 2016   • Obesity 02/16/2016    Resolved:  January 16, 2018   • Overweight 02/2014    Resolved:  February 16, 2016   • PONV (postoperative nausea and vomiting)     with fever of 101-102-treated with fluids s/p surgery-denied any other side effects with anesthesia   • Sebaceous cyst 11/15/2010     Past Surgical History:   Procedure Laterality Date   • FOOT SURGERY Left 2003   • TONSILLECTOMY     • WISDOM TOOTH EXTRACTION     • WRIST SURGERY Left 2003    titanium plate     Social History   Social History     Substance and Sexual Activity   Alcohol Use Yes     Social History     Substance and Sexual Activity   Drug Use No     Social History     Tobacco Use   Smoking Status Never   Smokeless Tobacco Never     Family History   Problem Relation Age of Onset   • Arthritis Mother    • Hyperlipidemia Mother    • Hypertension Father    • Prostate cancer Father    • Cancer Family    • Diabetes Family    • Heart disease Family    • Other Family         hyperlipoprotein       Meds/Allergies     (Not in a hospital admission)      No Known Allergies    Objective     Blood pressure 145/76, pulse (!) 50, temperature (!) 97 1 °F (36 2 °C), temperature source Temporal, resp  rate 16, SpO2 98 %      PHYSICAL EXAM:    Gen: NAD  CV: S1 & S2 normal, RRR  CHEST: Clear to auscultate  ABD: soft, NT/ND, good bowel sounds  EXT: no edema    ASSESSMENT:     Screening for colon cancer    PLAN:    Colonoscopy

## 2023-03-14 NOTE — ANESTHESIA PREPROCEDURE EVALUATION
Procedure:  PRE-OP ONLY    Relevant Problems   CARDIO   (+) Benign essential hypertension   (+) Mixed hyperlipidemia   (+) PAF (paroxysmal atrial fibrillation) (HCC)      GI/HEPATIC   (+) Fatty liver disease, nonalcoholic      Other   (+) BMI 36 0-36 9,adult   (+) Prediabetes             Anesthesia Plan  ASA Score- 3     Anesthesia Type- IV sedation with anesthesia with ASA Monitors  Additional Monitors:   Airway Plan:           Plan Factors-    Chart reviewed  EKG reviewed  Imaging results reviewed  Existing labs reviewed  Patient summary reviewed  Patient is not a current smoker  Patient did not smoke on day of surgery  Induction- intravenous  Postoperative Plan-     Informed Consent- Anesthetic plan and risks discussed with patient  I personally reviewed this patient with the CRNA  Discussed and agreed on the Anesthesia Plan with the CRNA  Scott Walton               Plan:  IV sedation/MAC, GA as backup

## 2023-03-22 ENCOUNTER — TELEPHONE (OUTPATIENT)
Dept: GASTROENTEROLOGY | Facility: CLINIC | Age: 48
End: 2023-03-22

## 2023-03-22 NOTE — TELEPHONE ENCOUNTER
Pt called in stating that he saw the [employer] message but he has some questions about his results

## 2023-03-22 NOTE — TELEPHONE ENCOUNTER
Patients GI provider:  Dr Ijeoma Lozada    Number to return call: [phone]    Reason for call: Pt calling because he received his colonoscopy results through my chart and would like to speak with Dr Ijeoma Lozada about them    Scheduled procedure/appointment date if applicable: N/A

## 2023-03-22 NOTE — TELEPHONE ENCOUNTER
Spoke with patient, reviewed biopsy results and recommendations  I answered all his questions  He will follow up with colonoscopy in one year no further questions       Recall set

## 2023-04-06 LAB
ALBUMIN SERPL-MCNC: 4.7 G/DL (ref 4–5)
ALBUMIN/GLOB SERPL: 2.2 {RATIO} (ref 1.2–2.2)
ALP SERPL-CCNC: 64 IU/L (ref 44–121)
ALT SERPL-CCNC: 38 IU/L (ref 0–44)
AST SERPL-CCNC: 27 IU/L (ref 0–40)
BILIRUB SERPL-MCNC: 1.1 MG/DL (ref 0–1.2)
BUN SERPL-MCNC: 17 MG/DL (ref 6–24)
BUN/CREAT SERPL: 17 (ref 9–20)
CALCIUM SERPL-MCNC: 10.3 MG/DL (ref 8.7–10.2)
CHLORIDE SERPL-SCNC: 107 MMOL/L (ref 96–106)
CHOLEST SERPL-MCNC: 152 MG/DL (ref 100–199)
CO2 SERPL-SCNC: 26 MMOL/L (ref 20–29)
CREAT SERPL-MCNC: 0.99 MG/DL (ref 0.76–1.27)
EGFR: 95 ML/MIN/1.73
GLOBULIN SER-MCNC: 2.1 G/DL (ref 1.5–4.5)
GLUCOSE SERPL-MCNC: 95 MG/DL (ref 70–99)
HBA1C MFR BLD: 5.3 % (ref 4.8–5.6)
HCV AB S/CO SERPL IA: NON REACTIVE
HDLC SERPL-MCNC: 52 MG/DL
LDLC SERPL CALC-MCNC: 84 MG/DL (ref 0–99)
MICRODELETION SYND BLD/T FISH: NORMAL
POTASSIUM SERPL-SCNC: 4.7 MMOL/L (ref 3.5–5.2)
PROT SERPL-MCNC: 6.8 G/DL (ref 6–8.5)
SODIUM SERPL-SCNC: 144 MMOL/L (ref 134–144)
TRIGL SERPL-MCNC: 87 MG/DL (ref 0–149)

## 2023-04-25 ENCOUNTER — APPOINTMENT (EMERGENCY)
Dept: RADIOLOGY | Facility: HOSPITAL | Age: 48
End: 2023-04-25

## 2023-04-25 ENCOUNTER — HOSPITAL ENCOUNTER (INPATIENT)
Facility: HOSPITAL | Age: 48
LOS: 1 days | Discharge: HOME | End: 2023-04-25
Attending: EMERGENCY MEDICINE | Admitting: INTERNAL MEDICINE

## 2023-04-25 ENCOUNTER — APPOINTMENT (INPATIENT)
Dept: NON INVASIVE DIAGNOSTICS | Facility: HOSPITAL | Age: 48
End: 2023-04-25

## 2023-04-25 VITALS
OXYGEN SATURATION: 99 % | DIASTOLIC BLOOD PRESSURE: 77 MMHG | TEMPERATURE: 98.1 F | SYSTOLIC BLOOD PRESSURE: 117 MMHG | WEIGHT: 281 LBS | HEIGHT: 74 IN | RESPIRATION RATE: 20 BRPM | BODY MASS INDEX: 36.06 KG/M2 | HEART RATE: 39 BPM

## 2023-04-25 DIAGNOSIS — I48.91 ATRIAL FIBRILLATION WITH RAPID VENTRICULAR RESPONSE (HCC): Primary | ICD-10-CM

## 2023-04-25 DIAGNOSIS — R00.1 BRADYCARDIA: ICD-10-CM

## 2023-04-25 DIAGNOSIS — I48.0 PAF (PAROXYSMAL ATRIAL FIBRILLATION) (HCC): ICD-10-CM

## 2023-04-25 DIAGNOSIS — E78.2 MIXED HYPERLIPIDEMIA: ICD-10-CM

## 2023-04-25 DIAGNOSIS — I10 BENIGN ESSENTIAL HYPERTENSION: ICD-10-CM

## 2023-04-25 PROBLEM — E87.6 HYPOKALEMIA: Status: ACTIVE | Noted: 2023-04-25

## 2023-04-25 LAB
2HR DELTA HS TROPONIN: -1 NG/L
4HR DELTA HS TROPONIN: -5 NG/L
ANION GAP SERPL CALCULATED.3IONS-SCNC: 6 MMOL/L (ref 4–13)
AORTIC ROOT: 3.2 CM
APICAL FOUR CHAMBER EJECTION FRACTION: 66 %
APTT PPP: 26 SECONDS (ref 23–37)
AV LVOT PEAK GRADIENT: 6 MMHG
AV PEAK GRADIENT: 6 MMHG
BACTERIA UR QL AUTO: NORMAL /HPF
BASOPHILS # BLD AUTO: 0.08 THOUSANDS/ÂΜL (ref 0–0.1)
BASOPHILS NFR BLD AUTO: 2 % (ref 0–1)
BILIRUB UR QL STRIP: NEGATIVE
BNP SERPL-MCNC: 105 PG/ML (ref 0–100)
BUN SERPL-MCNC: 16 MG/DL (ref 5–25)
CALCIUM SERPL-MCNC: 7.7 MG/DL (ref 8.4–10.2)
CARDIAC TROPONIN I PNL SERPL HS: 10 NG/L
CARDIAC TROPONIN I PNL SERPL HS: 5 NG/L
CARDIAC TROPONIN I PNL SERPL HS: 9 NG/L
CHLORIDE SERPL-SCNC: 115 MMOL/L (ref 96–108)
CLARITY UR: CLEAR
CO2 SERPL-SCNC: 22 MMOL/L (ref 21–32)
COLOR UR: YELLOW
CREAT SERPL-MCNC: 0.73 MG/DL (ref 0.6–1.3)
D DIMER PPP FEU-MCNC: 0.3 UG/ML FEU
E WAVE DECELERATION TIME: 270 MS
EOSINOPHIL # BLD AUTO: 0.1 THOUSAND/ÂΜL (ref 0–0.61)
EOSINOPHIL NFR BLD AUTO: 2 % (ref 0–6)
ERYTHROCYTE [DISTWIDTH] IN BLOOD BY AUTOMATED COUNT: 11.9 % (ref 11.6–15.1)
FRACTIONAL SHORTENING: 31 (ref 28–44)
GFR SERPL CREATININE-BSD FRML MDRD: 110 ML/MIN/1.73SQ M
GLUCOSE SERPL-MCNC: 116 MG/DL (ref 65–140)
GLUCOSE SERPL-MCNC: 84 MG/DL (ref 65–140)
GLUCOSE UR STRIP-MCNC: NEGATIVE MG/DL
HCT VFR BLD AUTO: 46.7 % (ref 36.5–49.3)
HGB BLD-MCNC: 16 G/DL (ref 12–17)
HGB UR QL STRIP.AUTO: ABNORMAL
IMM GRANULOCYTES # BLD AUTO: 0 THOUSAND/UL (ref 0–0.2)
IMM GRANULOCYTES NFR BLD AUTO: 0 % (ref 0–2)
INR PPP: 1 (ref 0.84–1.19)
INTERVENTRICULAR SEPTUM IN DIASTOLE (PARASTERNAL SHORT AXIS VIEW): 1.2 CM
INTERVENTRICULAR SEPTUM: 1.2 CM (ref 0.6–1.1)
KETONES UR STRIP-MCNC: NEGATIVE MG/DL
LAAS-AP2: 29.3 CM2
LAAS-AP4: 21.3 CM2
LEFT ATRIUM SIZE: 4.8 CM
LEFT INTERNAL DIMENSION IN SYSTOLE: 3.6 CM (ref 2.1–4)
LEFT VENTRICULAR INTERNAL DIMENSION IN DIASTOLE: 5.2 CM (ref 3.5–6)
LEFT VENTRICULAR POSTERIOR WALL IN END DIASTOLE: 1 CM
LEFT VENTRICULAR STROKE VOLUME: 77 ML
LEUKOCYTE ESTERASE UR QL STRIP: NEGATIVE
LVSV (TEICH): 77 ML
LYMPHOCYTES # BLD AUTO: 3.06 THOUSANDS/ÂΜL (ref 0.6–4.47)
LYMPHOCYTES NFR BLD AUTO: 56 % (ref 14–44)
MAGNESIUM SERPL-MCNC: 1.8 MG/DL (ref 1.9–2.7)
MCH RBC QN AUTO: 31.4 PG (ref 26.8–34.3)
MCHC RBC AUTO-ENTMCNC: 34.3 G/DL (ref 31.4–37.4)
MCV RBC AUTO: 92 FL (ref 82–98)
MONOCYTES # BLD AUTO: 0.48 THOUSAND/ÂΜL (ref 0.17–1.22)
MONOCYTES NFR BLD AUTO: 9 % (ref 4–12)
MV E'TISSUE VEL-SEP: 9 CM/S
MV PEAK A VEL: 0.01 M/S
MV PEAK E VEL: 62 CM/S
MV STENOSIS PRESSURE HALF TIME: 78 MS
MV VALVE AREA P 1/2 METHOD: 2.82
NEUTROPHILS # BLD AUTO: 1.68 THOUSANDS/ÂΜL (ref 1.85–7.62)
NEUTS SEG NFR BLD AUTO: 31 % (ref 43–75)
NITRITE UR QL STRIP: NEGATIVE
NON-SQ EPI CELLS URNS QL MICRO: NORMAL /HPF
NRBC BLD AUTO-RTO: 0 /100 WBCS
PH UR STRIP.AUTO: 7 [PH]
PLATELET # BLD AUTO: 153 THOUSANDS/UL (ref 149–390)
PMV BLD AUTO: 11.5 FL (ref 8.9–12.7)
POTASSIUM SERPL-SCNC: 3.2 MMOL/L (ref 3.5–5.3)
PROT UR STRIP-MCNC: NEGATIVE MG/DL
PROTHROMBIN TIME: 13.3 SECONDS (ref 11.6–14.5)
RBC # BLD AUTO: 5.09 MILLION/UL (ref 3.88–5.62)
RBC #/AREA URNS AUTO: NORMAL /HPF
RIGHT ATRIUM AREA SYSTOLE A4C: 18.3 CM2
RIGHT VENTRICLE ID DIMENSION: 3.8 CM
SL CV LEFT ATRIUM LENGTH A2C: 6.5 CM
SL CV LV EF: 65
SL CV PED ECHO LEFT VENTRICLE DIASTOLIC VOLUME (MOD BIPLANE) 2D: 131 ML
SL CV PED ECHO LEFT VENTRICLE SYSTOLIC VOLUME (MOD BIPLANE) 2D: 54 ML
SODIUM SERPL-SCNC: 143 MMOL/L (ref 135–147)
SP GR UR STRIP.AUTO: <=1.005 (ref 1–1.03)
TR MAX PG: 20 MMHG
TR PEAK VELOCITY: 2.3 M/S
TRICUSPID ANNULAR PLANE SYSTOLIC EXCURSION: 1.8 CM
TRICUSPID VALVE PEAK REGURGITATION VELOCITY: 2.26 M/S
TSH SERPL DL<=0.05 MIU/L-ACNC: 2.71 UIU/ML (ref 0.45–4.5)
UROBILINOGEN UR QL STRIP.AUTO: 0.2 E.U./DL
WBC # BLD AUTO: 5.4 THOUSAND/UL (ref 4.31–10.16)
WBC #/AREA URNS AUTO: NORMAL /HPF

## 2023-04-25 RX ORDER — ATROPINE SULFATE 1 MG/ML
1 INJECTION, SOLUTION INTRAVENOUS ONCE
Status: DISCONTINUED | OUTPATIENT
Start: 2023-04-25 | End: 2023-04-25 | Stop reason: CLARIF

## 2023-04-25 RX ORDER — ATROPINE SULFATE 0.1 MG/ML
1 INJECTION INTRAVENOUS ONCE
Status: COMPLETED | OUTPATIENT
Start: 2023-04-25 | End: 2023-04-25

## 2023-04-25 RX ORDER — ATORVASTATIN CALCIUM 10 MG/1
20 TABLET, FILM COATED ORAL
Status: DISCONTINUED | OUTPATIENT
Start: 2023-04-25 | End: 2023-04-25 | Stop reason: HOSPADM

## 2023-04-25 RX ORDER — POTASSIUM CHLORIDE 20 MEQ/1
40 TABLET, EXTENDED RELEASE ORAL ONCE
Status: COMPLETED | OUTPATIENT
Start: 2023-04-25 | End: 2023-04-25

## 2023-04-25 RX ORDER — NEBIVOLOL 10 MG/1
5 TABLET ORAL DAILY
Qty: 90 TABLET | Refills: 0 | Status: SHIPPED | OUTPATIENT
Start: 2023-04-25

## 2023-04-25 RX ORDER — HYDROXYZINE HYDROCHLORIDE 25 MG/1
25 TABLET, FILM COATED ORAL EVERY 6 HOURS PRN
Status: DISCONTINUED | OUTPATIENT
Start: 2023-04-25 | End: 2023-04-25

## 2023-04-25 RX ORDER — AMLODIPINE BESYLATE 2.5 MG/1
2.5 TABLET ORAL
Qty: 30 TABLET | Refills: 1 | Status: SHIPPED | OUTPATIENT
Start: 2023-04-25

## 2023-04-25 RX ORDER — METOPROLOL TARTRATE 5 MG/5ML
5 INJECTION INTRAVENOUS ONCE
Status: COMPLETED | OUTPATIENT
Start: 2023-04-25 | End: 2023-04-25

## 2023-04-25 RX ORDER — MAGNESIUM SULFATE HEPTAHYDRATE 40 MG/ML
2 INJECTION, SOLUTION INTRAVENOUS ONCE
Status: COMPLETED | OUTPATIENT
Start: 2023-04-25 | End: 2023-04-25

## 2023-04-25 RX ORDER — ASPIRIN 81 MG/1
81 TABLET ORAL DAILY
Qty: 30 TABLET | Refills: 0 | Status: SHIPPED | OUTPATIENT
Start: 2023-04-25

## 2023-04-25 RX ORDER — ATROPINE SULFATE 1 MG/ML
2 INJECTION, SOLUTION INTRAVENOUS ONCE
Status: DISCONTINUED | OUTPATIENT
Start: 2023-04-25 | End: 2023-04-25

## 2023-04-25 RX ORDER — ENOXAPARIN SODIUM 100 MG/ML
40 INJECTION SUBCUTANEOUS DAILY
Status: DISCONTINUED | OUTPATIENT
Start: 2023-04-25 | End: 2023-04-25 | Stop reason: HOSPADM

## 2023-04-25 RX ORDER — ATORVASTATIN CALCIUM 20 MG/1
20 TABLET, FILM COATED ORAL
Qty: 30 TABLET | Refills: 1 | Status: SHIPPED | OUTPATIENT
Start: 2023-04-25

## 2023-04-25 RX ADMIN — DILTIAZEM HYDROCHLORIDE 2.5 MG/HR: 5 INJECTION, SOLUTION INTRAVENOUS at 10:12

## 2023-04-25 RX ADMIN — ENOXAPARIN SODIUM 40 MG: 40 INJECTION SUBCUTANEOUS at 09:33

## 2023-04-25 RX ADMIN — ATROPINE SULFATE 1 MG: 0.1 INJECTION INTRAVENOUS at 05:45

## 2023-04-25 RX ADMIN — SODIUM CHLORIDE 1000 ML: 0.9 INJECTION, SOLUTION INTRAVENOUS at 03:37

## 2023-04-25 RX ADMIN — METOPROLOL TARTRATE 5 MG: 5 INJECTION INTRAVENOUS at 04:36

## 2023-04-25 RX ADMIN — SODIUM CHLORIDE 1000 ML: 0.9 INJECTION, SOLUTION INTRAVENOUS at 06:12

## 2023-04-25 RX ADMIN — SODIUM CHLORIDE 1000 ML: 0.9 INJECTION, SOLUTION INTRAVENOUS at 06:11

## 2023-04-25 RX ADMIN — MAGNESIUM SULFATE HEPTAHYDRATE 2 G: 40 INJECTION, SOLUTION INTRAVENOUS at 04:41

## 2023-04-25 RX ADMIN — POTASSIUM CHLORIDE 40 MEQ: 1500 TABLET, EXTENDED RELEASE ORAL at 04:16

## 2023-04-25 NOTE — ED NOTES
Pt speaking to Dr Pastor Vinson about signing out AMA, during conversation patients heart rate dropped to 14 as well as BP dropped to 71/46 and patient became unresponsive, sternal rub performed by Dr Pastor Vinson and patient administered atropine and fluids  Patient responsive to atropine and more fluids initiated at that time  Second IV placed and patient began to come around  Alert and oriented at this time, /a-fib and BP >357 systolic        Papo Cox RN  04/25/23 1528

## 2023-04-25 NOTE — ASSESSMENT & PLAN NOTE
· Outpatient regimen consists of: bystolic 10 mg + amlodipine 2 5 mg  · Hold for now in setting of bradycardia and hypotension

## 2023-04-25 NOTE — NURSING NOTE
Gave patient discharge instructions to follow up with cardiologist within one week  Patient was also instructed to return if he was experiencing any symptoms such as palpitations, dizziness, etc  Patient verbalized understanding  Both IV's removed intact  Patient tolerated well

## 2023-04-25 NOTE — DISCHARGE INSTR - AVS FIRST PAGE
Please call and set up cardiology outpatient follow up if you don't hear from their office to schedule your appointment   Please return to ER with any symptoms of dizziness, palpitations, fainting  Please check your blood pressure in the morning and evening   If blood pressure remains > 130/80 please increase amlodipine to 5 mg

## 2023-04-25 NOTE — CONSULTS
Consultation - Cardiology   [facility]     Balta Babin 52 y o  male MRN: [id number]  : 1975  Unit/Bed#: ICU 05 Encounter: [id number]      Assessment & Plan   1  Paroxysmal atrial fibrillation      - Patient with known history of paroxysmal atrial fibrillation    - Previously evaluated by Dr Riri Ramirez of [facility] in  at that time taken off flecainide and started on Bystolic    - Patient started on cardizem gtt this morning in ICU, converted from atrial fibrillation to sinus rhythm at 1050hrs on 23    - Recommend patient decrease Bystolic from 10 mg daily to 5 mg daily  - [id number] stroke risk score: 1 point, low-moderate risk    - Recommend start aspirin 81 mg daily on discharge   - Recommend patient follow up outpatient for extended monitor  Will ask outpatient cardiology office contact patient to set up appointment to place monitor   - 23 TTE: LVEF 65%  Systolic function is normal  Wall motion is normal  Left atrium is mildl to moderately  Aortic Valve: There is trace regurgitation,  Mitral Valve: There is mild regurgitation  Tricuspid Valve: There is trace to mild regurgitation,  Pulmonary artery pressure around 25 mmHg  Pulmonic Valve: There is trace regurgitation   - Recommend patient follow up with outpatient Cardiology within 2 weeks of discharge  2  Hypertension      - BP controlled  - Continue amlodipine 2 5 mg daily    - Recommend patient decrease Bystolic from 10 mg daily to 5 mg daily  3  Hyperlipidemia  - 23 lipid panel:  Cholesterol 152, trigylcerides 87, HDL 52, LDL 84    - Continue lipitor 20 mg daily  Summary of Recommendations: Thank you for your consultation  Physician Requesting Consult: Brandan Vanessa DO    Reason for Consult / Principal Problem: paroxysmal atrial fibrillation       Consults    HPI: Balta Babin is a 52y o  year old male with PMHx of paroxysmal atrial fibrillation (not on anticoagulation, "HTN, HLD, who presents for evaluation for palpitations  Patient reports he awoke around 3AM on 4/25/23 with intense palpitations  He states he awoke from a vivid nightmare where he thought he was drowning  He reports he has been very stressed in recent months and especially the past couple of days because he started a new job yesterday  Patient denies palptitations were associated with chest pain, shortness of breath, lightheadedness, dizziness, nausea or vomiting  In ED, EKG noted atrial fibrillation with RVR, ventricular rate 100s  1L IVF and 5 mg of lopressor were given with improvement in HR to 70s but patient remained in afib  Critical care note documents patient became upset and hypertensive when ED attending reported planned admission, patient's heart rate reportedly dropped to the 30s and patient loss consciousness  BP also noted to have dropped when patient passed out  1 mg of atropine and a bolus of IVF was given and patient regained consciousnes, HR improved to 80s-100s  Patient has a known history of paroxsymal atrial fibrillation and states he can feel when he goes into atrial fibrillation  The patient and wife report he typically goes into atrial fibrillation when stressed  Patient last saw Dr Kitty Cuevas [facility] on 8/21/20  Dr Yue Orellana note states - \"we agreed upon a plan stopping the flecainide given that his lifestyle has changed significantly and if he has recurrent atrial fibrillation we will move forward with the ablation  If he has recurrent atrial fibrillation off the flecainide he will simply restart the flecainide and we will schedule an ablation he has hypertension and I recommend that he remain on nebivolol 5 mg daily and increase to 10 mg if his blood pressure goes over 130/80 I asked him to check this a few times a week  \"        Patient started on cardizem gtt this morning in ICU, converted from atrial fibrillation to sinus rhythm at 1050hrs on 4/25/23   " Review of Systems   Constitutional: Negative for activity change, chills, fatigue, fever and unexpected weight change  Respiratory: Negative for apnea, cough, choking, chest tightness, shortness of breath and wheezing  Cardiovascular: Positive for palpitations  Negative for chest pain and leg swelling  Gastrointestinal: Negative for abdominal pain, constipation, diarrhea, nausea and vomiting  Skin: Negative  Neurological: Negative for dizziness, syncope, weakness, light-headedness, numbness and headaches         Historical Information   Past Medical History:   Diagnosis Date   • Atrial fibrillation [facility])     summer 0f 2022 x1(dehydration caused a fib) episode-previously 2019 had afib   • Finger deformity, left 02/16/2016    Resolved:  September 22, 2017   • Fracture of finger, left, closed 05/26/2016    Resolved:  September 22, 2017   • Fracture of proximal phalanx of lesser toe of left foot 08/29/2016    Initial encounter:  Resolved:  September 22, 2017   • H/O vitamin D deficiency    • Hyperlipidemia    • Hypertension    • Neoplasm of uncertain behavior of skin 08/02/2012    Resolved:  February 16, 2016   • Obesity 02/16/2016    Resolved:  January 16, 2018   • Overweight 02/2014    Resolved:  February 16, 2016   • PONV (postoperative nausea and vomiting)     with fever of 101-102-treated with fluids s/p surgery-denied any other side effects with anesthesia   • Sebaceous cyst 11/15/2010     Past Surgical History:   Procedure Laterality Date   • FOOT SURGERY Left 2003   • TONSILLECTOMY     • WISDOM TOOTH EXTRACTION     • WRIST SURGERY Left 2003    titanium plate     Social History     Substance and Sexual Activity   Alcohol Use Yes    Comment: socially     Social History     Substance and Sexual Activity   Drug Use No     Social History     Tobacco Use   Smoking Status Never   Smokeless Tobacco Never     Family History:   Family History   Problem Relation Age of Onset   • Arthritis Mother    • "Hyperlipidemia Mother    • Hypertension Father    • Prostate cancer Father    • Cancer Family    • Diabetes Family    • Heart disease Family    • Other Family         hyperlipoprotein       Meds/Allergies    PTA meds:    Medications Prior to Admission   Medication   • amLODIPine (NORVASC) 2 5 mg tablet   • atorvastatin (LIPITOR) 20 mg tablet   • Calcium Polycarbophil (FIBER-CAPS PO)   • cholecalciferol (VITAMIN D3) 1,000 units tablet   • multivitamin (THERAGRAN) TABS   • nebivolol (BYSTOLIC) 10 mg tablet   • Omega-3 Fatty Acids (Fish Oil) 1200 MG CAPS      No Known Allergies    Current Facility-Administered Medications:   •  atorvastatin (LIPITOR) tablet 20 mg, 20 mg, Oral, HS, DAVID Crespo  •  diltiazem (CARDIZEM) 125 mg in sodium chloride 0 9 % 125 mL infusion, 1-15 mg/hr, Intravenous, Titrated, DAVID Crespo, Stopped at 04/25/23 1051  •  enoxaparin (LOVENOX) subcutaneous injection 40 mg, 40 mg, Subcutaneous, Daily, DAVID Crespo, 40 mg at 04/25/23 0933    VTE Pharmacologic Prophylaxis:   Enoxaparin (Lovenox)    Objective:   Vitals: Blood pressure 98/59, pulse (!) 34, temperature (!) 97 4 °F (36 3 °C), temperature source Temporal, resp  rate 16, height 6' 2\" (1 88 m), weight 127 kg (281 lb), SpO2 97 %  Body mass index is 36 08 kg/m²    Wt Readings from Last 3 Encounters:   04/25/23 127 kg (281 lb)   04/11/23 127 kg (281 lb)   03/10/23 130 kg (286 lb)     BP Readings from Last 3 Encounters:   04/25/23 98/59   04/11/23 130/80   03/14/23 124/64     Orthostatic Blood Pressures    Flowsheet Row Most Recent Value   Blood Pressure 98/59 filed at 04/25/2023 1145   Patient Position - Orthostatic VS Lying filed at 04/25/2023 0745          Intake/Output Summary (Last 24 hours) at 4/25/2023 1523  Last data filed at 4/25/2023 0750  Gross per 24 hour   Intake 3050 ml   Output 2000 ml   Net 1050 ml       Invasive Devices     Peripheral Intravenous Line  Duration           Peripheral IV " 04/25/23 Left Antecubital <1 day    Peripheral IV 04/25/23 Right;Ventral (anterior) Forearm <1 day                  Physical Exam:   Physical Exam  Vitals reviewed  Constitutional:       General: He is not in acute distress  Cardiovascular:      Rate and Rhythm: Normal rate and regular rhythm  Pulses: Normal pulses  Heart sounds: Murmur heard  Pulmonary:      Effort: Pulmonary effort is normal  No respiratory distress  Breath sounds: Normal breath sounds  Abdominal:      General: Abdomen is flat  There is no distension  Palpations: Abdomen is soft  Tenderness: There is no abdominal tenderness  Musculoskeletal:      Right lower leg: No edema  Left lower leg: No edema  Skin:     General: Skin is warm and dry  Neurological:      Mental Status: He is alert and oriented to person, place, and time  Psychiatric:         Mood and Affect: Mood is anxious         Labs:   Troponins:  Results from last 7 days   Lab Units 04/25/23  0928 04/25/23  0523   HSTNI D2 ng/L  --  -1   HSTNI D4 ng/L -5  --        CBC with diff:   Results from last 7 days   Lab Units 04/25/23  0334   WBC Thousand/uL 5 40   HEMOGLOBIN g/dL 16 0   HEMATOCRIT % 46 7   MCV fL 92   PLATELETS Thousands/uL 153   MCH pg 31 4   MCHC g/dL 34 3   RDW % 11 9   MPV fL 11 5   NRBC AUTO /100 WBCs 0       CMP:   Results from last 7 days   Lab Units 04/25/23  0334   SODIUM mmol/L 143   POTASSIUM mmol/L 3 2*   CHLORIDE mmol/L 115*   CO2 mmol/L 22   ANION GAP mmol/L 6   BUN mg/dL 16   CREATININE mg/dL 0 73   CALCIUM mg/dL 7 7*   EGFR ml/min/1 73sq m 110   GLUCOSE RANDOM mg/dL 84       Magnesium:   Results from last 7 days   Lab Units 04/25/23  0334   MAGNESIUM mg/dL 1 8*     Coags:   Results from last 7 days   Lab Units 04/25/23  0344   PTT seconds 26   INR  1 00     TSH:    Results from last 7 days   Lab Units 04/25/23  0334   TSH 3RD GENERATON uIU/mL 2 706     No components found for: TSH3  Lipid Profile:     Lipid Profile: Lab Results   Component Value Date    CHOLESTEROL 152 2023    HDL 52 2023    LDLCALC 84 2023    TRIG 87 2023     Hgb A1c:     NT-proBNP: No results for input(s): NTBNP in the last 72 hours  Imaging & Testing     Cardiac testing:     Echo complete w/ contrast if indicated    Result Date: 2023  Narrative: •  Left Ventricle: Left ventricular cavity size is normal  Wall thickness is mildly increased  There is mild concentric hypertrophy  The left ventricular ejection fraction is 65% by visual estimation  Systolic function is normal  Wall motion is normal  •  Left Atrium: The atrium is mildl to moderately dilated  LA volume index is around 38 mL/m² •  Aortic Valve: There is trace regurgitation  •  Mitral Valve: There is mild regurgitation  •  Tricuspid Valve: There is trace to mild regurgitation  Pulmonary artery pressure around 25 mmHg  •  Pulmonic Valve: There is trace regurgitation  Results for orders placed during the hospital encounter of 02/15/18    Echo complete with contrast if indicated    Narrative  [address]  [address] [address]  [phone]    Transthoracic Echocardiogram  2D, M-mode, Doppler, and Color Doppler    Study date:  15-Feb-2018    Patient: Juan Carlos Vasquez  MR number: [id number]  Account number: [de-identified]  : 1975  Age: 43 years  Gender: Male  Status: Routine  Location: Emergency room  Height: 75 in  Weight: 273 5 lb  BP: 25853/ 73 mmHg    Indications: A Fib , Abnormal heart sound    Diagnoses: I48 0 - Atrial fibrillation    Sonographer:  TIFFANIE Schaefer  Primary Physician:  Hola Oswald [place]:  [facility]  Interpreting Physician:  Latasha Vidal MD    SUMMARY    LEFT VENTRICLE:  Systolic function was normal by Teichholz  Ejection fraction was estimated in the range of 50 % to 55 % to be 52 %  There were no regional wall motion abnormalities    Wall thickness was at the upper limits of normal     LEFT ATRIUM:  The atrium was mildly to moderately dilated  RIGHT ATRIUM:  The atrium was mildly dilated  HISTORY: PRIOR HISTORY: HTN, Hyperlipidemia    PROCEDURE: The procedure was performed in the emergency room  This was a routine study  The transthoracic approach was used  The study included complete 2D imaging, M-mode, complete spectral Doppler, and color Doppler  The heart rate was  51 bpm, at the start of the study  Images were obtained from the parasternal, apical, subcostal, and suprasternal notch acoustic windows  Image quality was adequate  LEFT VENTRICLE: Size was normal  Systolic function was normal by Teichholz  Ejection fraction was estimated in the range of 50 % to 55 % to be 52 %  There were no regional wall motion abnormalities  Wall thickness was at the upper limits  of normal  No evidence of apical thrombus  DOPPLER: Left ventricular diastolic function parameters were normal     RIGHT VENTRICLE: The size was normal  Systolic function was normal  Wall thickness was normal     LEFT ATRIUM: The atrium was mildly to moderately dilated  RIGHT ATRIUM: The atrium was mildly dilated  MITRAL VALVE: Valve structure was normal  There was normal leaflet separation  DOPPLER: The transmitral velocity was within the normal range  There was no evidence for stenosis  There was no significant regurgitation  AORTIC VALVE: The valve was trileaflet  Leaflets exhibited mildly increased thickness and normal cuspal separation  DOPPLER: Transaortic velocity was within the normal range  There was no evidence for stenosis  There was no significant  regurgitation  TRICUSPID VALVE: The valve structure was normal  There was normal leaflet separation  DOPPLER: The transtricuspid velocity was within the normal range  There was no evidence for stenosis  There was no significant regurgitation      PULMONIC VALVE: Leaflets exhibited normal thickness, no calcification, and normal cuspal separation  DOPPLER: The transpulmonic velocity was within the normal range  There was no significant regurgitation  PERICARDIUM: There was no pericardial effusion  The pericardium was normal in appearance  AORTA: The root exhibited normal size  SYSTEMIC VEINS: IVC: The inferior vena cava was normal in size  SYSTEM MEASUREMENT TABLES    2D mode  AoR Diam 2D: 3 4 cm  LA Diam (2D): 4 5 cm  LA/Ao (2D): 1 32  FS (2D Teich): 27 4 %  IVSd (2D): 0 97 cm  LVDEV: 146 cm³  LVEDV MOD BP: 180 cm³  LVESV: 68 8 cm³  LVIDd(2D): 5 47 cm  LVISd (2D): 3 97 cm  LVOT Area 2D: 3 14 cm squared  LVPWd (2D): 1 11 cm  SV (Teich): 77 2 cm³    Apical four chamber  LVEF A4C: 54 %    Apical two chamber  LA Area: 26 4 cm squared  LA Volume: 99 cm³  LVEF A2C: 52 %    Unspecified Scan Mode  NATALIE Cont Eq (Peak Bonifacio): 2 61 cm squared  LVOT Diam : 2 cm  LVOT Vmax: 1170 mm/s  LVOT Vmax; Mean: 1170 mm/s  Peak Grad ; Mean: 5 mm[Hg]  MV Peak A Bonifacio: 578 mm/s  MV Peak E Bonifacio  Mean: 884 mm/s  MVA (PHT): 4 31 cm squared  PHT: 51 ms  RA Area: 19 6 cm squared  RA Volume: 57 4 cm³  TAPSE: 2 3 cm    [employer] Accredited Echocardiography Laboratory    Prepared and electronically signed by    Pio Momin MD  Signed 15-Feb-2018 10:12:16        Imaging: I have personally reviewed pertinent reports  Echo complete w/ contrast if indicated    Result Date: 4/25/2023  Narrative: •  Left Ventricle: Left ventricular cavity size is normal  Wall thickness is mildly increased  There is mild concentric hypertrophy  The left ventricular ejection fraction is 65% by visual estimation  Systolic function is normal  Wall motion is normal  •  Left Atrium: The atrium is mildl to moderately dilated  LA volume index is around 38 mL/m² •  Aortic Valve: There is trace regurgitation  •  Mitral Valve: There is mild regurgitation  •  Tricuspid Valve: There is trace to mild regurgitation  Pulmonary artery pressure around 25 mmHg  •  Pulmonic Valve:  There is trace regurgitation  EKG/ Monitor: Personally reviewed  Telemetry reviewed: currently sinus bradycardic, rate 39 bpm  Converted from atrial fibrillation to sinus rhythm  at 1050hrs on 4/25/23        Code Status: Level 1 - Full Code  Advance Directive and Living Will:      POLST:        Suzy Storm PA-C

## 2023-04-25 NOTE — ASSESSMENT & PLAN NOTE
· Patient with history of paroxsymal atrial fibrillation  · He was previously on flecainide back in 2020 and was followed by interventional cardiology at [facility]     · Episodes of atrial fibrillation were precipitated by dehydration +/- stress  · He was planned to undergo ablation; however after review of Zio patch, he was not in atrial fibrillation, was noted to have several episodes of bradycardia-- at which time flecainide was stopped and Bystolic was decreased to 5 mg  · Patient presents today after feelings of palpitations with concerns for atrial fibrillation  · Of note, he is under new stressor of starting a new job yesterday   · Upon presentation to the ED, he was found to be in atrial fibrillation with rates 100-130's  · He was given 1L IVF and 5 mg of lopressor with subsequent decrease in HR to 70's  · He did not convert back to NSR; ED attending was discussing with patient about being admitted to the hospital-- patient had rise in blood pressure at this time, then apparent drop in HR to the 30's with subsequent drop in blood pressure to systolic in 83'H  · He was given 1 mg of atropine with HR increasing back to 80's-100's  · He was placed on cardizem gtt at 2 5mg and converted back to SR  · ECHO obtained and revealed EF 65%, mild MR, mild TR    · Per cardiology patient to be discharged with decreased Bystolic dose of 5 mg

## 2023-04-25 NOTE — ASSESSMENT & PLAN NOTE
· Outpatient regimen consists of: bystolic 10 mg + amlodipine 2 5 mg  · Decrease Bystolic to 5 mg and follow up outpatient with cardiology

## 2023-04-25 NOTE — H&P
[facility] 45  H&P  Name: Annia Coon 52 y o  male I MRN: [id number]  Unit/Bed#: ED 01 I Date of Admission: 4/25/2023   Date of Service: 4/25/2023 I Hospital Day: 0      Assessment/Plan   * PAF (paroxysmal atrial fibrillation) (Formerly [facility])  Assessment & Plan  · Patient with history of paroxsymal atrial fibrillation  · He was previously on flecainide back in 2020 and was followed by interventional cardiology at [facility]     · Episodes of atrial fibrillation were precipitated by dehydration +/- stress  · He was planned to undergo ablation; however after review of Zio patch, he was not in atrial fibrillation, was noted to have several episodes of bradycardia-- at which time flecainide was stopped and Bystolic was decreased to 5 mg  · Patient presents today after feelings of palpitations with concerns for atrial fibrillation  · Of note, he is under new stressor of starting a new job yesterday   · Upon presentation to the ED, he was found to be in atrial fibrillation with rates 100-130's  · He was given 1L IVF and 5 mg of lopressor with subsequent decrease in HR to 70's  · He did not convert back to NSR; ED attending was discussing with patient about being admitted to the hospital-- patient had rise in blood pressure at this time, then apparent drop in HR to the 30's with subsequent drop in blood pressure to systolic in 89'M  · He was given 1 mg of atropine with HR increasing back to 80's-100's    Plan:  · Cardiology consult  · Hold outpatient bystolic at this time  · Suspect event was possible vasal vagal  · Admit to Stepdown unit for close monitoring     Benign essential hypertension  Assessment & Plan  · Outpatient regimen consists of: bystolic 10 mg + amlodipine 2 5 mg  · Hold for now in setting of bradycardia and hypotension     Hypokalemia  Assessment & Plan  · Replete as appropriate    Mixed hyperlipidemia  Assessment & Plan  · Continue statin           History of Present Illness     HPI: Annia Coon is a 52 y o  who presents with complaints of palpations with concern for atrial fibrillation  Patient has a past medical history of hypertension, PAF, and hyperlipidemia  Patient was previously followed by interventional cardiology back in August 2020  At which time he was planning to undergo an ablation  However, after review of zio patch, he did not have any episodes of a fib, but was noted to have episodes of bradycardia  At which time, flecainide was discontinued and his bystolic dose was decreased to 5 mg  Patient noted to have stressor of new job, with first day being yesterday  He presented to ED today and found to be in atrial fibrillation with rates 100-130s  He was given 1L IVF and 5 mg of lopressor  At which time his heart rate decreased to the 70's, but he remained in atrial fibrillation  The ED attending was discussing admission with the patient, at which time he became hypertensive, then had subsequent drop in heart to the 30's and loss of consciousness and then drop in blood pressure  He was administer 1 mg of atropine and given additional bolus of IVF  He regained consciousness immediately and had rise in HR to 80's to 100's  Patient to be admitted to [facility] for close hemodynamic monitoring  History obtained from chart review and the patient  Review of Systems   Cardiovascular: Positive for palpitations  All other systems reviewed and are negative      Historical Information   Past Medical History:  No date: Atrial fibrillation [facility])      Comment:  summer 0f 2022 x1(dehydration caused a fib)                episode-previously 2019 had afib  02/16/2016: Finger deformity, left      Comment:  Resolved:  September 22, 2017 05/26/2016: Fracture of finger, left, closed      Comment:  Resolved:  September 22, 2017 08/29/2016: Fracture of proximal phalanx of lesser toe of left foot      Comment:  Initial encounter:  Resolved:  September 22, 2017  No date: H/O vitamin D deficiency  No date: Hyperlipidemia  No date: Hypertension  08/02/2012: Neoplasm of uncertain behavior of skin      Comment:  Resolved:  February 16, 2016 02/16/2016: Obesity      Comment:  Resolved:  January 16, 2018 02/2014: Overweight      Comment:  Resolved:  February 16, 2016  No date: PONV (postoperative nausea and vomiting)      Comment:  with fever of 101-102-treated with fluids s/p                surgery-denied any other side effects with anesthesia  11/15/2010: Sebaceous cyst Past Surgical History:  2003: FOOT SURGERY; Left  No date: TONSILLECTOMY  No date: WISDOM TOOTH EXTRACTION  2003: WRIST SURGERY;  Left      Comment:  titanium plate   Current Outpatient Medications   Medication Instructions   • amLODIPine (NORVASC) 2 5 mg, Oral, Daily   • atorvastatin (LIPITOR) 20 mg, Oral, Daily   • Calcium Polycarbophil (FIBER-CAPS PO) PGX   • cholecalciferol (VITAMIN D3) 1,000 Units, Oral, 2 times weekly   • multivitamin (THERAGRAN) TABS 1 tablet, Oral, Daily   • nebivolol (BYSTOLIC) 10 mg, Oral, Daily   • Omega-3 Fatty Acids (Fish Oil) 1200 MG CAPS Oral, Daily    No Known Allergies   Social History     Tobacco Use   • Smoking status: Never   • Smokeless tobacco: Never   Vaping Use   • Vaping Use: Never used   Substance Use Topics   • Alcohol use: Yes     Comment: social   • Drug use: No    Family History   Problem Relation Age of Onset   • Arthritis Mother    • Hyperlipidemia Mother    • Hypertension Father    • Prostate cancer Father    • Cancer Family    • Diabetes Family    • Heart disease Family    • Other Family         hyperlipoprotein          Objective                            Vitals I/O      Most Recent Min/Max in 24hrs   Temp 97 6 °F (36 4 °C) Temp  Min: 97 6 °F (36 4 °C)  Max: 97 6 °F (36 4 °C)   Pulse 98 Pulse  Min: 82  Max: 106   Resp 22 Resp  Min: 16  Max: 22   /83 BP  Min: 76/47  Max: 163/112   O2 Sat 99 % SpO2  Min: 94 %  Max: 100 %      Intake/Output Summary (Last 24 hours) at 4/25/2023 2937  Last data filed at 4/25/2023 0651  Gross per 24 hour   Intake 1050 ml   Output 2000 ml   Net -950 ml         No diet orders on file     Invasive Monitoring Physical exam    Physical Exam  Constitutional:       General: He is not in acute distress  Appearance: He is normal weight  HENT:      Head: Normocephalic  Nose: Nose normal       Mouth/Throat:      Mouth: Mucous membranes are moist       Pharynx: Oropharynx is clear  Eyes:      Extraocular Movements: Extraocular movements intact  Conjunctiva/sclera: Conjunctivae normal       Pupils: Pupils are equal, round, and reactive to light  Cardiovascular:      Rate and Rhythm: Normal rate  Rhythm irregular  Pulses: Normal pulses  Heart sounds: Normal heart sounds  Pulmonary:      Effort: Pulmonary effort is normal       Breath sounds: Normal breath sounds  Abdominal:      Palpations: Abdomen is soft  Musculoskeletal:         General: Normal range of motion  Cervical back: Normal range of motion and neck supple  Right lower leg: No edema  Left lower leg: No edema  Skin:     General: Skin is warm and dry  Neurological:      General: No focal deficit present  Mental Status: He is alert and oriented to person, place, and time  Psychiatric:         Mood and Affect: Mood normal          Behavior: Behavior normal          Thought Content: Thought content normal           Diagnostic Studies    EKG: Atrial fibrillation rate of 80's  Imaging:  I have personally reviewed pertinent reports         Medications:  Scheduled PRN      hydrOXYzine HCL, 25 mg, Q6H PRN       Continuous          Labs:  CBC    Recent Labs     04/25/23  0334   WBC 5 40   HGB 16 0   HCT 46 7        BMP    Recent Labs     04/25/23  0334   SODIUM 143   K 3 2*   *   CO2 22   AGAP 6   BUN 16   CREATININE 0 73   CALCIUM 7 7*       Coags    Recent Labs     04/25/23  0344   INR 1 00   PTT 26        Additional Electrolytes  Recent Labs     04/25/23  0334   MG 1 8*          Blood Gas    No recent results  No recent results LFTs  No recent results    Infectious  No recent results  Glucose  Recent Labs     04/25/23  0334   GLUC 84             Anticipated Length of Stay is > 2 midnights  DAVID Gaines

## 2023-04-25 NOTE — ED PROVIDER NOTES
History  Chief Complaint   Patient presents with   • Atrial Fibrillation     Pt arrives with palpitations starting about 45 minutes pta  Reports a hx of a-fib, not currently taking any medications for it, most recent episode was last summer  Pt is not anti-coagulated at this time  Denies CP/SOB     80-year-old male with past history of paroxysmal atrial fibrillation currently not anticoagulated, hypertension, hyperlipidemia, vitamin D deficiency, presents to the ED for evaluation of palpitation  About an hour prior to arrival patient states that he was sleeping and he had a bad dream   He woke up with palpitation  He knew that he was in A-fib  Patient has some chest discomfort  Patient subsequently came to the ED for further evaluation  Patient states that he has been doing well over the past few months  Patient used to be on aspirin for his A-fib however it was taken off few months ago  Patient is followed by cardiologist   Patient last saw his cardiologist about 1 year ago  Patient states that sometimes his A-fib is exacerbated by dehydration  History provided by:  Patient  Atrial Fibrillation  Associated symptoms: no abdominal pain, no chest pain, no cough, no ear pain, no fever, no rash, no shortness of breath, no sore throat and no vomiting        Prior to Admission Medications   Prescriptions Last Dose Informant Patient Reported? Taking?    Calcium Polycarbophil (FIBER-CAPS PO)   Yes No   Sig: PGX   Omega-3 Fatty Acids (Fish Oil) 1200 MG CAPS 4/24/2023  Yes Yes   Sig: Take by mouth in the morning   amLODIPine (NORVASC) 2 5 mg tablet 4/24/2023  No Yes   Sig: Take 1 tablet (2 5 mg total) by mouth daily   Patient taking differently: Take 2 5 mg by mouth daily at bedtime   atorvastatin (LIPITOR) 20 mg tablet 4/24/2023  No Yes   Sig: Take 1 tablet (20 mg total) by mouth daily   Patient taking differently: Take 20 mg by mouth daily at bedtime   cholecalciferol (VITAMIN D3) 1,000 units tablet 4/24/2023 Yes Yes   Sig: Take 1,000 Units by mouth 2 (two) times a week   multivitamin (THERAGRAN) TABS 4/24/2023  Yes Yes   Sig: Take 1 tablet by mouth daily   nebivolol (BYSTOLIC) 10 mg tablet 2/68/1297  No Yes   Sig: Take 1 tablet (10 mg total) by mouth daily   Patient taking differently: Take 10 mg by mouth daily at bedtime      Facility-Administered Medications: None       Past Medical History:   Diagnosis Date   • Atrial fibrillation [facility])     summer 0f 2022 x1(dehydration caused a fib) episode-previously 2019 had afib   • Finger deformity, left 02/16/2016    Resolved:  September 22, 2017   • Fracture of finger, left, closed 05/26/2016    Resolved:  September 22, 2017   • Fracture of proximal phalanx of lesser toe of left foot 08/29/2016    Initial encounter:  Resolved:  September 22, 2017   • H/O vitamin D deficiency    • Hyperlipidemia    • Hypertension    • Neoplasm of uncertain behavior of skin 08/02/2012    Resolved:  February 16, 2016   • Obesity 02/16/2016    Resolved:  January 16, 2018   • Overweight 02/2014    Resolved:  February 16, 2016   • PONV (postoperative nausea and vomiting)     with fever of 101-102-treated with fluids s/p surgery-denied any other side effects with anesthesia   • Sebaceous cyst 11/15/2010       Past Surgical History:   Procedure Laterality Date   • FOOT SURGERY Left 2003   • TONSILLECTOMY     • WISDOM TOOTH EXTRACTION     • WRIST SURGERY Left 2003    titanium plate       Family History   Problem Relation Age of Onset   • Arthritis Mother    • Hyperlipidemia Mother    • Hypertension Father    • Prostate cancer Father    • Cancer Family    • Diabetes Family    • Heart disease Family    • Other Family         hyperlipoprotein     I have reviewed and agree with the history as documented      E-Cigarette/Vaping   • E-Cigarette Use Never User      E-Cigarette/Vaping Substances   • Nicotine No    • THC No    • CBD No    • Flavoring No    • Other No    • Unknown No      Social History Tobacco Use   • Smoking status: Never   • Smokeless tobacco: Never   Vaping Use   • Vaping Use: Never used   Substance Use Topics   • Alcohol use: Yes     Comment: social   • Drug use: No       Review of Systems   Constitutional: Negative for chills and fever  HENT: Negative for ear pain and sore throat  Eyes: Negative for pain and visual disturbance  Respiratory: Negative for cough and shortness of breath  Cardiovascular: Positive for palpitations  Negative for chest pain  Gastrointestinal: Negative for abdominal pain and vomiting  Genitourinary: Negative for dysuria and hematuria  Musculoskeletal: Negative for arthralgias and back pain  Skin: Negative for color change and rash  Neurological: Negative for seizures and syncope  All other systems reviewed and are negative  Physical Exam  Physical Exam  Vitals and nursing note reviewed  Constitutional:       General: He is not in acute distress  Appearance: He is well-developed  HENT:      Head: Normocephalic and atraumatic  Eyes:      Conjunctiva/sclera: Conjunctivae normal    Cardiovascular:      Rate and Rhythm: Tachycardia present  Heart sounds: No murmur heard  Comments: A-fib RVR noted on the monitor  Pulmonary:      Effort: Pulmonary effort is normal  No respiratory distress  Breath sounds: Normal breath sounds  Comments: Lungs are clear to auscultation bilateral   Abdominal:      Palpations: Abdomen is soft  Tenderness: There is no abdominal tenderness  Musculoskeletal:         General: No swelling  Cervical back: Neck supple  Skin:     General: Skin is warm and dry  Capillary Refill: Capillary refill takes less than 2 seconds  Neurological:      Mental Status: He is alert     Psychiatric:         Mood and Affect: Mood normal          Vital Signs  ED Triage Vitals   Temperature Pulse Respirations Blood Pressure SpO2   04/25/23 0331 04/25/23 0323 04/25/23 0323 04/25/23 0323 04/25/23 0323   97 6 °F (36 4 °C) (!) 106 19 (!) 163/112 99 %      Temp Source Heart Rate Source Patient Position - Orthostatic VS BP Location FiO2 (%)   04/25/23 0331 04/25/23 0323 04/25/23 0323 04/25/23 0323 --   Oral Monitor Sitting Left arm       Pain Score       --                  Vitals:    04/25/23 0545 04/25/23 0600 04/25/23 0603 04/25/23 0615   BP: (!) 160/110 (!) 76/47 (!) 87/48 123/86   Pulse: 94 82 99 100   Patient Position - Orthostatic VS: Lying Lying Lying Lying         Visual Acuity      ED Medications  Medications   hydrOXYzine HCL (ATARAX) tablet 25 mg (has no administration in time range)   sodium chloride 0 9 % bolus 1,000 mL (1,000 mL Intravenous New Bag 4/25/23 0612)   sodium chloride 0 9 % bolus 1,000 mL (1,000 mL Intravenous New Bag 4/25/23 0611)   Atropine Sulfate injection 2 mg (has no administration in time range)   sodium chloride 0 9 % bolus 1,000 mL (0 mL Intravenous Stopped 4/25/23 0437)   potassium chloride (K-DUR,KLOR-CON) CR tablet 40 mEq (40 mEq Oral Given 4/25/23 0416)   metoprolol (LOPRESSOR) injection 5 mg (5 mg Intravenous Given 4/25/23 0436)   magnesium sulfate 2 g/50 mL IVPB (premix) 2 g (0 g Intravenous Stopped 4/25/23 0541)       Diagnostic Studies  Results Reviewed     Procedure Component Value Units Date/Time    HS Troponin I 2hr [[id number]]  (Normal) Collected: 04/25/23 0523    Lab Status: Final result Specimen: Blood from Arm, Left Updated: 04/25/23 0627     hs TnI 2hr 9 ng/L      Delta 2hr hsTnI -1 ng/L     Fingerstick Glucose (POCT) [[id number]]  (Normal) Collected: 04/25/23 0601    Lab Status: Final result Updated: 04/25/23 0602     POC Glucose 116 mg/dl     TSH, 3rd generation with Free T4 reflex [[id number]]  (Normal) Collected: 04/25/23 0334    Lab Status: Final result Specimen: Blood from Arm, Left Updated: 04/25/23 0421     TSH 3RD GENERATON 2 706 uIU/mL     HS Troponin 0hr (reflex protocol) [[id number]]  (Normal) Collected: 04/25/23 0334    Lab Status: Final result Specimen: Blood from Arm, Left Updated: 04/25/23 0413     hs TnI 0hr 10 ng/L     HS Troponin I 4hr [[id number]]     Lab Status: No result Specimen: Blood     Urine Microscopic [[id number]]  (Normal) Collected: 04/25/23 0338    Lab Status: Final result Specimen: Urine, Clean Catch Updated: 04/25/23 0410     RBC, UA None Seen /hpf      WBC, UA None Seen /hpf      Epithelial Cells Occasional /hpf      Bacteria, UA None Seen /hpf     D-Dimer [[id number]]  (Normal) Collected: 04/25/23 0344    Lab Status: Final result Specimen: Blood from Arm, Left Updated: 04/25/23 0404     D-Dimer, Quant 0 30 ug/ml FEU     Basic metabolic panel [[id number]]  (Abnormal) Collected: 04/25/23 0334    Lab Status: Final result Specimen: Blood from Arm, Left Updated: 04/25/23 0403     Sodium 143 mmol/L      Potassium 3 2 mmol/L      Chloride 115 mmol/L      CO2 22 mmol/L      ANION GAP 6 mmol/L      BUN 16 mg/dL      Creatinine 0 73 mg/dL      Glucose 84 mg/dL      Calcium 7 7 mg/dL      eGFR 110 ml/min/1 73sq m     Narrative:      [place] guidelines for Chronic Kidney Disease (CKD):   •  Stage 1 with normal or high GFR (GFR > 90 mL/min/1 73 square meters)  •  Stage 2 Mild CKD (GFR = 60-89 mL/min/1 73 square meters)  •  Stage 3A Moderate CKD (GFR = 45-59 mL/min/1 73 square meters)  •  Stage 3B Moderate CKD (GFR = 30-44 mL/min/1 73 square meters)  •  Stage 4 Severe CKD (GFR = 15-29 mL/min/1 73 square meters)  •  Stage 5 End Stage CKD (GFR <15 mL/min/1 73 square meters)  Note: GFR calculation is accurate only with a steady state creatinine    Magnesium [[id number]]  (Abnormal) Collected: 04/25/23 0334    Lab Status: Final result Specimen: Blood from Arm, Left Updated: 04/25/23 0403     Magnesium 1 8 mg/dL     Protime-INR [[id number]]  (Normal) Collected: 04/25/23 0344    Lab Status: Final result Specimen: Blood from Arm, Left Updated: 04/25/23 0402     Protime 13 3 seconds      INR 1 00    APTT [[id number]]  (Normal) Collected: 04/25/23 0344    Lab Status: Final result Specimen: Blood from Arm, Left Updated: 04/25/23 0402     PTT 26 seconds     UA w Reflex to Microscopic w Reflex to Culture [[id number]]  (Abnormal) Collected: 04/25/23 0338    Lab Status: Final result Specimen: Urine, Clean Catch Updated: 04/25/23 0344     Color, UA Yellow     Clarity, UA Clear     Specific Gravity, UA <=1 005     pH, UA 7 0     Leukocytes, UA Negative     Nitrite, UA Negative     Protein, UA Negative mg/dl      Glucose, UA Negative mg/dl      Ketones, UA Negative mg/dl      Urobilinogen, UA 0 2 E U /dl      Bilirubin, UA Negative     Occult Blood, UA Trace-Intact    CBC and differential [[id number]]  (Abnormal) Collected: 04/25/23 0334    Lab Status: Final result Specimen: Blood from Arm, Left Updated: 04/25/23 0343     WBC 5 40 Thousand/uL      RBC 5 09 Million/uL      Hemoglobin 16 0 g/dL      Hematocrit 46 7 %      MCV 92 fL      MCH 31 4 pg      MCHC 34 3 g/dL      RDW 11 9 %      MPV 11 5 fL      Platelets 306 Thousands/uL      nRBC 0 /100 WBCs      Neutrophils Relative 31 %      Immat GRANS % 0 %      Lymphocytes Relative 56 %      Monocytes Relative 9 %      Eosinophils Relative 2 %      Basophils Relative 2 %      Neutrophils Absolute 1 68 Thousands/µL      Immature Grans Absolute 0 00 Thousand/uL      Lymphocytes Absolute 3 06 Thousands/µL      Monocytes Absolute 0 48 Thousand/µL      Eosinophils Absolute 0 10 Thousand/µL      Basophils Absolute 0 08 Thousands/µL     NT-BNP [facility] only [[id number]] Collected: 04/25/23 0334    Lab Status:  In process Specimen: Blood from Arm, Left Updated: 04/25/23 0341                 XR chest 1 view portable    (Results Pending)              Procedures  ECG 12 Lead Documentation Only    Date/Time: 4/25/2023 3:17 AM  Performed by: Portia Sorenson DO  Authorized by: Portia Sorenson DO     Indications / Diagnosis:  Palpitation  ECG reviewed by me, the ED Provider: yes    Patient location: ED  Previous ECG:     Previous ECG:  Unavailable    Comparison to cardiac monitor: Yes    Interpretation:     Interpretation: abnormal    Comments:      Irregularly irregular rhythm, rate 106, normal axis, normal intervals, no acute ST/T wave abdomens noted, atrial fibrillation with rapid ventricular response noted, no previous EKG images available for comparison however previous EKG documentation reads normal sinus rhythm    CriticalCare Time    Date/Time: 4/25/2023 5:33 AM  Performed by: Michel Randall DO  Authorized by: Michel Randall DO     Critical care provider statement:     Critical care time (minutes):  75    Critical care time was exclusive of:  Separately billable procedures and treating other patients    Critical care was necessary to treat or prevent imminent or life-threatening deterioration of the following conditions:  Cardiac failure    Critical care was time spent personally by me on the following activities:  Blood draw for specimens, obtaining history from patient or surrogate, development of treatment plan with patient or surrogate, discussions with consultants, evaluation of patient's response to treatment, examination of patient, review of old charts, re-evaluation of patient's condition, ordering and review of laboratory studies, ordering and review of radiographic studies, interpretation of cardiac output measurements and ordering and performing treatments and interventions  Comments:      Atrial fibrillation with rapid ventricular response, as well as bradycardia  ECG 12 Lead Documentation Only    Date/Time: 4/25/2023 5:52 AM  Performed by: Michel Randall DO  Authorized by: Michel Randall DO     Indications / Diagnosis:  Follow-up  ECG reviewed by me, the ED Provider: yes    Patient location:  ED  Previous ECG:     Previous ECG:  Compared to current    Similarity:  Changes noted    Comparison to cardiac monitor: Yes    Interpretation:     Interpretation: abnormal    Comments: Irregularly irregular rhythm, rate 80, normal axis, normal intervals, no acute ST/tumors noted, atrial fibrillation noted, previous EKG showed rapid ventricular response that is no longer present  ED Course  ED Course as of 04/25/23 0629   Tue Apr 25, 2023   0551 Started to complain of hot flashes and felt like he was going to pass out  I went to examine patient and he slowly became bradycardic with heart rate of 14 and he was briefly unresponsive  2 mg of atropine given  Heart rate started to improve and patient regained consciousness  SBIRT 20yo+    Flowsheet Row Most Recent Value   Initial Alcohol Screen: US AUDIT-C     1  How often do you have a drink containing alcohol? 0 Filed at: 04/25/2023 0341   2  How many drinks containing alcohol do you have on a typical day you are drinking? 0 Filed at: 04/25/2023 0341   3a  Male UNDER 65: How often do you have five or more drinks on one occasion? 0 Filed at: 04/25/2023 0341   Audit-C Score 0 Filed at: 04/25/2023 8433   YARELY: How many times in the past year have you    Used an illegal drug or used a prescription medication for non-medical reasons? Never Filed at: 04/25/2023 4125                    Medical Decision Making  Blood work, EKG, chest x-ray  IV fluids, rate control medication continue to monitor patient for any worsening symptoms  Since blood pressure improved with Lopressor  Heart rate is starting to improve with IV fluids and Lopressor however patient continues to be in atrial fibrillation  Plan was to admit patient to hospitalist service for A-fib RVR  After patient was admitted and he was seen by nurse practitioner for the hospitalist, patient started to complain of hot flashes and feeling weak and lightheaded  Patient then became unresponsive and bradycardic for a few seconds  His heart rate went down to 14    Subsequently 2 mg of atropine was given with improvement of heart rate as well as regaining of consciousness  Patient then became hypotensive  Subsequent IV fluid bolus started  At this time patient will be admitted to stepdown 1 ICU team for further evaluation and management  Patient and family agrees with admission plans  Amount and/or Complexity of Data Reviewed  Labs: ordered  Radiology: ordered  Risk  Prescription drug management  Decision regarding hospitalization  Disposition  Final diagnoses:   Atrial fibrillation with rapid ventricular response ([facility] [address] )   Bradycardia     Time reflects when diagnosis was documented in both MDM as applicable and the Disposition within this note     Time User Action Codes Description Comment    4/25/2023  5:32 AM Nevin [place] Add [I48 91] Atrial fibrillation with rapid ventricular response ([facility] [address] )     4/25/2023  5:57 AM Nevin [place] Add [R00 1] Bradycardia       ED Disposition     ED Disposition   Admit    Condition   Stable    Date/Time   Tue Apr 25, 2023  6:28 AM    Comment   Case was discussed with ICU AP for hospitalist and the patient's admission status was agreed to be Admission Status: observation status to the service of Dr Kinza Maddox  Follow-up Information    None         Patient's Medications   Discharge Prescriptions    No medications on file       No discharge procedures on file      PDMP Review     None          ED Provider  Electronically Signed by           Ricki Miranda DO  04/25/23 3267

## 2023-04-25 NOTE — ASSESSMENT & PLAN NOTE
· Patient with history of paroxsymal atrial fibrillation  · He was previously on flecainide back in 2020 and was followed by interventional cardiology at [facility]     · Episodes of atrial fibrillation were precipitated by dehydration +/- stress  · He was planned to undergo ablation; however after review of Zio patch, he was not in atrial fibrillation, was noted to have several episodes of bradycardia-- at which time flecainide was stopped and Bystolic was decreased to 5 mg  · Patient presents today after feelings of palpitations with concerns for atrial fibrillation  · Of note, he is under new stressor of starting a new job yesterday   · Upon presentation to the ED, he was found to be in atrial fibrillation with rates 100-130's  · He was given 1L IVF and 5 mg of lopressor with subsequent decrease in HR to 70's  · He did not convert back to NSR; ED attending was discussing with patient about being admitted to the hospital-- patient had rise in blood pressure at this time, then apparent drop in HR to the 30's with subsequent drop in blood pressure to systolic in 57'I  · He was given 1 mg of atropine with HR increasing back to 80's-100's    Plan:  · Cardiology consult  · Hold outpatient bystolic at this time  · Suspect event was possible vasal vagal  · Admit to Stepdown unit for close monitoring

## 2023-04-25 NOTE — Clinical Note
Case was discussed with ICU AP for hospitalist and the patient's admission status was agreed to be Admission Status: observation status to the service of Dr Raven Yost

## 2023-04-25 NOTE — UTILIZATION REVIEW
Initial Clinical Review    WAS OBSERVATION 04/25/2023 @ 0532, CONVERTED TO INPATIENT ADMISSION 04/25/2023 @ 0628, DUE TO CONTINUED STAY REQUIRED TO CARE FOR PATIENT WITH    Admission: Date/Time/Statement:   Admission Orders (From admission, onward)     Ordered        04/25/23 0628  INPATIENT ADMISSION  Once            04/25/23 0532  Place in Observation  Once,   Status:  Canceled                      Orders Placed This Encounter   Procedures   • INPATIENT ADMISSION     Standing Status:   Standing     Number of Occurrences:   1     Order Specific Question:   Level of Care     Answer:   Level 1 Stepdown [13]     Order Specific Question:   Estimated length of stay     Answer:   More than 2 Midnights     Order Specific Question:   Certification     Answer:   I certify that inpatient services are medically necessary for this patient for a duration of greater than two midnights  See H&P and MD Progress Notes for additional information about the patient's course of treatment  ED Arrival Information     Expected   -    Arrival   4/25/2023 03:14    Acuity   Urgent            Means of arrival   Walk-In    Escorted by   Self    Service   Critical Care/ICU    Admission type   Emergency            Arrival complaint   AFIB           Chief Complaint   Patient presents with   • Atrial Fibrillation     Pt arrives with palpitations starting about 45 minutes pta  Reports a hx of a-fib, not currently taking any medications for it, most recent episode was last summer  Pt is not anti-coagulated at this time  Denies CP/SOB       Initial Presentation: 52 y o  male , presented to the ED @ [address] from via walk in  Admitted as Inpatient due to PAF  Date: 04/25/2023  Patient was previously followed by interventional cardiology back in August 2020  At which time he was planning to undergo an ablation  However, after review of zio patch, he did not have any episodes of a fib, but was noted to have episodes of bradycardia   At which time, flecainide was discontinued and his bystolic dose was decreased to 5 mg       Patient noted to have stressor of new job, with first day being yesterday  He presented to ED today and found to be in atrial fibrillation with rates 100-130s  He was given 1L IVF and 5 mg of lopressor  At which time his heart rate decreased to the 70's, but he remained in atrial fibrillation       The ED attending was discussing admission with the patient, at which time he became hypertensive, then had subsequent drop in heart to the 30's and loss of consciousness and then drop in blood pressure  He was administer 1 mg of atropine and given additional bolus of IVF  He regained consciousness immediately and had rise in HR to 80's to 100's  4/25 Monitor on telemetry:  PAF     4/25 1mg Atropine, 3L IVF bolus, cardizem infusion  4/25 ECHO  4/25 converted back to SR      04/25/2023 Consult Cardio:    1  Paroxysmal atrial fibrillation     - Patient with known history of paroxysmal atrial fibrillation    - Previously evaluated by Dr Munira Muro of [facility] in 2020 at that time taken off flecainide and started on Bystolic    - Patient started on cardizem gtt this morning in ICU, converted from atrial fibrillation to sinus rhythm at 1050hrs on 4/25/23    - Recommend patient decrease Bystolic from 10 mg daily to 5 mg daily  - [id number] stroke risk score: 1 point, low-moderate risk    - Recommend start aspirin 81 mg daily on discharge   - Recommend patient follow up outpatient for extended monitor  Will ask outpatient cardiology office contact patient to set up appointment to place monitor   - 4/25/23 TTE: LVEF 65%  Systolic function is normal  Wall motion is normal  Left atrium is mildl to moderately  Aortic Valve: There is trace regurgitation,  Mitral Valve: There is mild regurgitation  Tricuspid Valve: There is trace to mild regurgitation,  Pulmonary artery pressure around 25 mmHg  Pulmonic Valve:  There is trace regurgitation   - Recommend patient follow up with outpatient Cardiology within 2 weeks of discharge     2  Hypertension     - BP controlled  - Continue amlodipine 2 5 mg daily    - Recommend patient decrease Bystolic from 10 mg daily to 5 mg daily         ED Triage Vitals   Temperature Pulse Respirations Blood Pressure SpO2   04/25/23 0331 04/25/23 0323 04/25/23 0323 04/25/23 0323 04/25/23 0323   97 6 °F (36 4 °C) (!) 106 19 (!) 163/112 99 %      Temp Source Heart Rate Source Patient Position - Orthostatic VS BP Location FiO2 (%)   04/25/23 0331 04/25/23 0323 04/25/23 0323 04/25/23 0323 --   Oral Monitor Sitting Left arm       Pain Score       04/25/23 0905       No Pain          Wt Readings from Last 1 Encounters:   04/25/23 127 kg (281 lb)     Additional Vital Signs:   Date/Time Temp Pulse Resp BP MAP (mmHg) SpO2 Calculated FIO2 (%) - Nasal Cannula Nasal Cannula O2 Flow Rate (L/min) O2 Device Patient Position - Orthostatic VS   04/25/23 1600 98 1 °F (36 7 °C) -- -- -- -- -- -- -- -- --   04/25/23 1515 -- 39 Abnormal  20 117/77 94 99 % -- -- -- --   04/25/23 1500 -- 41 Abnormal  17 118/79 94 98 % -- -- -- --   04/25/23 1445 -- 43 Abnormal  14 119/77 93 98 % -- -- -- --   04/25/23 1430 -- 51 Abnormal  19 121/80 94 97 % -- -- -- --   04/25/23 1400 -- 36 Abnormal  16 119/69 89 95 % -- -- -- --   04/25/23 1300 -- 34 Abnormal  13 101/62 76 97 % -- -- -- --   04/25/23 1200 -- 36 Abnormal  14 105/63 79 98 % -- -- None (Room air) Lying   04/25/23 1145 -- 34 Abnormal  16 98/59 74 97 % -- -- -- --   04/25/23 1133 97 4 °F (36 3 °C) Abnormal  47 Abnormal  23 Abnormal  111/65 81 98 % -- -- -- --   04/25/23 1115 -- 52 Abnormal  20 113/66 83 96 % -- -- -- --   04/25/23 1100 -- 38 Abnormal  15 116/74 92 96 % -- -- -- --   04/25/23 1051 -- 40 Abnormal  16 -- -- 96 % -- -- -- --   04/25/23 1050 -- 85 16 -- -- 96 % -- -- -- --   04/25/23 1045 -- 96 18 110/56 77 96 % -- -- -- --   04/25/23 1030 -- 98 14 133/80 100 97 % -- -- -- -- 04/25/23 1012 -- 110 Abnormal  18 112/73 88 98 % -- -- -- --   04/25/23 0945 -- 92 -- 134/73 -- -- -- -- -- --   04/25/23 0900 -- 94 17 129/72 93 97 % -- -- -- --   04/25/23 0830 -- 90 17 115/77 91 95 % -- -- -- --   04/25/23 0745 -- 90 20 129/67 90 98 % 28 2 L/min Nasal cannula Lying   04/25/23 0730 -- 100 20 130/67 91 100 % 28 2 L/min Nasal cannula Lying   04/25/23 0715 -- 86 20 134/73 95 99 % 28 2 L/min Nasal cannula Lying   04/25/23 0700 -- 98 22 136/83 105 99 % -- -- None (Room air) Lying   04/25/23 0645 -- 100 22 144/87 107 100 % -- -- None (Room air) Lying   04/25/23 0615 -- 100 20 123/86 99 98 % -- -- None (Room air) Lying   04/25/23 0603 -- 99 16 87/48 Abnormal  62 Abnormal  96 % -- -- None (Room air) Lying   04/25/23 0600 -- 82 16 76/47 Abnormal   55 Abnormal  94 % -- -- None (Room air) Lying   BP: md made aware at 04/25/23 0600   04/25/23 0545 -- 94 21 160/110 Abnormal  132 97 % -- -- None (Room air) Lying   04/25/23 0500 -- 90 19 140/83 104 95 % -- -- None (Room air) Lying   04/25/23 0445 -- 88 18 147/99 118 96 % -- -- None (Room air) Lying   04/25/23 0430 -- 91 20 158/84 114 96 % -- -- None (Room air) Lying   04/25/23 0415 -- 96 21 154/101 Abnormal  122 95 % -- -- None (Room air) Lying   04/25/23 0400 -- 106 Abnormal  20 156/104 Abnormal  125 95 % -- -- None (Room air) Lying           Pertinent Labs/Diagnostic Test Results:   XR chest 1 view portable    (Results Pending)     Results from last 7 days   Lab Units 04/25/23  0334   WBC Thousand/uL 5 40   HEMOGLOBIN g/dL 16 0   HEMATOCRIT % 46 7   PLATELETS Thousands/uL 153   NEUTROS ABS Thousands/µL 1 68*     Results from last 7 days   Lab Units 04/25/23  0334   SODIUM mmol/L 143   POTASSIUM mmol/L 3 2*   CHLORIDE mmol/L 115*   CO2 mmol/L 22   ANION GAP mmol/L 6   BUN mg/dL 16   CREATININE mg/dL 0 73   EGFR ml/min/1 73sq m 110   CALCIUM mg/dL 7 7*   MAGNESIUM mg/dL 1 8*     Results from last 7 days   Lab Units 04/25/23  0601   POC GLUCOSE mg/dl 116 Results from last 7 days   Lab Units 04/25/23  0334   GLUCOSE RANDOM mg/dL 84       Results from last 7 days   Lab Units 04/25/23  0928 04/25/23  0523 04/25/23  0334   HS TNI 0HR ng/L  --   --  10   HS TNI 2HR ng/L  --  9  --    HSTNI D2 ng/L  --  -1  --    HS TNI 4HR ng/L 5  --   --    HSTNI D4 ng/L -5  --   --      Results from last 7 days   Lab Units 04/25/23  0344   D-DIMER QUANTITATIVE ug/ml FEU 0 30     Results from last 7 days   Lab Units 04/25/23  0344   PROTIME seconds 13 3   INR  1 00   PTT seconds 26     Results from last 7 days   Lab Units 04/25/23  0334   TSH 3RD GENERATON uIU/mL 2 706     Results from last 7 days   Lab Units 04/25/23  0334   BNP pg/mL 105*     Results from last 7 days   Lab Units 04/25/23  0338   CLARITY UA  Clear   COLOR UA  Yellow   SPEC GRAV UA  <=1 005   PH UA  7 0   GLUCOSE UA mg/dl Negative   KETONES UA mg/dl Negative   BLOOD UA  Trace-Intact*   PROTEIN UA mg/dl Negative   NITRITE UA  Negative   BILIRUBIN UA  Negative   UROBILINOGEN UA E U /dl 0 2   LEUKOCYTES UA  Negative   WBC UA /hpf None Seen   RBC UA /hpf None Seen   BACTERIA UA /hpf None Seen   EPITHELIAL CELLS WET PREP /hpf Occasional     ED Treatment:   Medication Administration from 04/25/2023 0313 to 04/25/2023 0818       Date/Time Order Dose Route Action     04/25/2023 0337 EDT sodium chloride 0 9 % bolus 1,000 mL 1,000 mL Intravenous New Bag     04/25/2023 0416 EDT potassium chloride (K-DUR,KLOR-CON) CR tablet 40 mEq 40 mEq Oral Given     04/25/2023 0436 EDT metoprolol (LOPRESSOR) injection 5 mg 5 mg Intravenous Given     04/25/2023 0441 EDT magnesium sulfate 2 g/50 mL IVPB (premix) 2 g 2 g Intravenous New Bag     04/25/2023 0612 EDT sodium chloride 0 9 % bolus 1,000 mL 1,000 mL Intravenous New Bag     04/25/2023 6974 EDT sodium chloride 0 9 % bolus 1,000 mL 1,000 mL Intravenous New Bag     04/25/2023 0545 EDT atropine 1 mg/10 mL injection 1 mg 1 mg Intravenous Given     04/25/2023 0545 EDT atropine 1 mg/10 mL injection 1 mg 1 mg Intravenous Given        Past Medical History:   Diagnosis Date   • Atrial fibrillation [facility])     summer 0f 2022 x1(dehydration caused a fib) episode-previously 2019 had afib   • Finger deformity, left 02/16/2016    Resolved:  September 22, 2017   • Fracture of finger, left, closed 05/26/2016    Resolved:  September 22, 2017   • Fracture of proximal phalanx of lesser toe of left foot 08/29/2016    Initial encounter:  Resolved:  September 22, 2017   • H/O vitamin D deficiency    • Hyperlipidemia    • Hypertension    • Neoplasm of uncertain behavior of skin 08/02/2012    Resolved:  February 16, 2016   • Obesity 02/16/2016    Resolved:  January 16, 2018   • Overweight 02/2014    Resolved:  February 16, 2016   • PONV (postoperative nausea and vomiting)     with fever of 101-102-treated with fluids s/p surgery-denied any other side effects with anesthesia   • Sebaceous cyst 11/15/2010     Present on Admission:  • PAF (paroxysmal atrial fibrillation) ([facility])  • Benign essential hypertension  • Mixed hyperlipidemia      Admitting Diagnosis: Bradycardia [R00 1]  PAF (paroxysmal atrial fibrillation) ([facility] [address] ) [I48 0]  Atrial fibrillation with rapid ventricular response ([facility] [address] ) [I48 91]  Age/Sex: 52 y o  male  Admission Orders:  NPO  Up w Assistance  Daily weight I&O  Neuro checks q4h  Dysphagia Assessment  Juice SCDs    Scheduled Medications:  atorvastatin, 20 mg, Oral, HS  enoxaparin, 40 mg, Subcutaneous, Daily      Continuous IV Infusions:  diltiazem, 1-15 mg/hr, Intravenous, Titrated      PRN Meds:       IP CONSULT TO CASE MANAGEMENT  IP CONSULT TO CARDIOLOGY    Network Utilization Review Department  ATTENTION: Please call with any questions or concerns to [phone] and carefully listen to the prompts so that you are directed to the right person   All voicemails are confidential   Rukhsana Shetty all requests for admission clinical reviews, approved or denied determinations and any other requests to dedicated fax number below belonging to the campus where the patient is receiving treatment   List of dedicated fax numbers for the Facilities:  [address] [place] [address] DENIALS (Administrative/Medical Necessity) [phone]   [address] (Maternity/NICU/Pediatrics) [phone]   [address] [phone]   [address] [phone]   [address] [place] [address] [facility] [address] [address] [phone]   [address] [facility] [address] [facility] [phone]

## 2023-04-26 ENCOUNTER — TRANSITIONAL CARE MANAGEMENT (OUTPATIENT)
Dept: FAMILY MEDICINE CLINIC | Facility: CLINIC | Age: 48
End: 2023-04-26

## 2023-04-26 DIAGNOSIS — I48.0 PAF (PAROXYSMAL ATRIAL FIBRILLATION) (HCC): Primary | ICD-10-CM

## 2023-04-26 LAB
ATRIAL RATE: 39 BPM
MRSA NOSE QL CULT: NORMAL
P AXIS: 29 DEGREES
PR INTERVAL: 196 MS
QRS AXIS: 0 DEGREES
QRS AXIS: 1 DEGREES
QRS AXIS: 18 DEGREES
QRSD INTERVAL: 96 MS
QT INTERVAL: 318 MS
QT INTERVAL: 394 MS
QT INTERVAL: 458 MS
QTC INTERVAL: 368 MS
QTC INTERVAL: 422 MS
QTC INTERVAL: 454 MS
T WAVE AXIS: -5 DEGREES
T WAVE AXIS: 0 DEGREES
T WAVE AXIS: 32 DEGREES
VENTRICULAR RATE: 106 BPM
VENTRICULAR RATE: 39 BPM
VENTRICULAR RATE: 80 BPM

## 2023-04-26 NOTE — UTILIZATION REVIEW
NOTIFICATION OF INPATIENT ADMISSION   AUTHORIZATION REQUEST   SERVICING FACILITY:   [place] [address]  [address] [place] [address]  Tax ID: [id number]  NPI: [id number] ATTENDING PROVIDER:  Attending Name and NPI#: Shankar Magaña [[id number]]  Address: [address] [place] [address]  Phone: [phone]   ADMISSION INFORMATION:  Place of Service: Inpatient [address] [facility] Code: 21  Inpatient Admission Date/Time: 4/25/23  6:28 AM  Discharge Date/Time: 4/25/2023  4:37 PM  Admitting Diagnosis Code/Description:  Bradycardia [R00 1]  PAF (paroxysmal atrial fibrillation) ([facility] [address] ) [I48 0]  Atrial fibrillation with rapid ventricular response ([facility] [address] ) [I48 91]     UTILIZATION REVIEW CONTACT:  Jennifer Gonzales Utilization   Network Utilization Review Department  Phone: [phone]  Fax [phone]  Email: [email or web address]  Contact for approvals/pending authorizations, clinical reviews, and discharge  PHYSICIAN ADVISORY SERVICES:  Medical Necessity Denial & Rffc-hb-Ctvy Review  Phone: [phone]  Fax: [phone]  Email: [email or web address]

## 2023-04-27 NOTE — UTILIZATION REVIEW
NOTIFICATION OF ADMISSION DISCHARGE   This is a Notification of Discharge from [address] [facility]  Please be advised that this patient has been discharge from our facility  Below you will find the admission and discharge date and time including the patient’s disposition  UTILIZATION REVIEW CONTACT:  Parmjit Uriostegui  Utilization   Network Utilization Review Department  Phone: [phone] x carefully listen to the prompts  All voicemails are confidential   Email: [email or web address]     ADMISSION INFORMATION  PRESENTATION DATE: 4/25/2023  3:24 AM  OBERVATION ADMISSION DATE:   INPATIENT ADMISSION DATE: 4/25/23  6:28 AM   DISCHARGE DATE: 4/25/2023  4:37 PM   DISPOSITION:Home/Self Care    IMPORTANT INFORMATION:  Send all requests for admission clinical reviews, approved or denied determinations and any other requests to dedicated fax number below belonging to the campus where the patient is receiving treatment   List of dedicated fax numbers:  [address] DENIALS (Administrative/Medical Necessity) [phone]   [address] (Maternity/NICU/Pediatrics) [phone]   [facility] [phone]   [facility] [phone]   [address] [phone]   [address] [facility] [phone]   [address] [place] [address] [phone]   [address] [place] [address] 119 [phone]   [facility]  [phone]   [address] [facility] [phone]   [address] [facility] 850 [facility] [phone]

## 2023-06-05 ENCOUNTER — OFFICE VISIT (OUTPATIENT)
Dept: CARDIOLOGY CLINIC | Facility: CLINIC | Age: 48
End: 2023-06-05
Payer: COMMERCIAL

## 2023-06-05 VITALS
BODY MASS INDEX: 36.06 KG/M2 | WEIGHT: 281 LBS | OXYGEN SATURATION: 99 % | SYSTOLIC BLOOD PRESSURE: 138 MMHG | HEIGHT: 74 IN | HEART RATE: 46 BPM | DIASTOLIC BLOOD PRESSURE: 90 MMHG

## 2023-06-05 DIAGNOSIS — I10 BENIGN ESSENTIAL HYPERTENSION: ICD-10-CM

## 2023-06-05 DIAGNOSIS — E78.2 MIXED HYPERLIPIDEMIA: ICD-10-CM

## 2023-06-05 DIAGNOSIS — I48.0 PAF (PAROXYSMAL ATRIAL FIBRILLATION) (HCC): Primary | ICD-10-CM

## 2023-06-05 PROCEDURE — 99214 OFFICE O/P EST MOD 30 MIN: CPT | Performed by: INTERNAL MEDICINE

## 2023-06-05 PROCEDURE — 93000 ELECTROCARDIOGRAM COMPLETE: CPT | Performed by: INTERNAL MEDICINE

## 2023-06-05 RX ORDER — NEBIVOLOL 5 MG/1
5 TABLET ORAL DAILY
Qty: 90 TABLET | Refills: 3 | Status: SHIPPED | OUTPATIENT
Start: 2023-06-05

## 2023-06-05 NOTE — PROGRESS NOTES
Cardiology Follow Up  Keron Leal  1975  [id number]  [facility] [address] [address]    1  PAF (paroxysmal atrial fibrillation) (HCC)  POCT ECG    nebivolol (BYSTOLIC) 5 mg tablet      2  Benign essential hypertension  POCT ECG    nebivolol (BYSTOLIC) 5 mg tablet      3  Mixed hyperlipidemia  POCT ECG         Discussion/Plan:  Paroxysmal afib-he has had very infrequent breakthrough arrhythmias  His last episode was 1 year prior  He was previously on flecainide  His Bystolic was decreased to 5 mg secondary to low heart rate  We will continue on current strategy  He has seen EP in the past   If he has more frequent  Check episodes consideration for ablation  CHADS2 score is 1  We discussed about direct oral anticoagulation versus aspirin  Patient would like to continue with aspirin    Hypertension-diastolic blood pressures are mildly elevated  He is currently on amlodipine 2 5 mg  He will continue to monitor his blood pressures  If he has continued elevated blood pressure possible up titration of amlodipine to 5 mg    Mild sleep apnea- declines CPAP  Does not feel daytime fatigue  Wants to try weight loss + exercise  Elevated triglycerides- improved triglycerides    Family Hx MI- lipid panel/hscrp    Health maintenance- pending wellness exam    Interval History:  He was not taking flecainide consistently  Had second breakthru episode  No taking meds consistently  Denies having alcohol usage  Cut down his caffeine  6/6/2023: He had recent hospitalization secondary to A-fib with RVR  He had a syncopal episode possibly secondary to dehydration  He denies having recurrence of symptoms  He is compliant with his medications  He has very infrequent breakthrough arrhythmias      Patient Active Problem List   Diagnosis   • Benign essential hypertension   • Fatty liver disease, nonalcoholic   • Mixed hyperlipidemia   • Obesity   • Palpitations   • PAF (paroxysmal atrial fibrillation) (HCC)   • BMI 36 0-36 9,adult   • Long term current use of antiarrhythmic medical therapy   • Nondisplaced fracture of middle phalanx of left ring finger with routine healing   • Prediabetes   • Hypokalemia     Past Medical History:   Diagnosis Date   • Atrial fibrillation [facility])     summer 0f 2022 x1(dehydration caused a fib) episode-previously 2019 had afib   • Finger deformity, left 02/16/2016    Resolved:  September 22, 2017   • Fracture of finger, left, closed 05/26/2016    Resolved:  September 22, 2017   • Fracture of proximal phalanx of lesser toe of left foot 08/29/2016    Initial encounter:  Resolved:  September 22, 2017   • H/O vitamin D deficiency    • Hyperlipidemia    • Hypertension    • Neoplasm of uncertain behavior of skin 08/02/2012    Resolved:  February 16, 2016   • Obesity 02/16/2016    Resolved:  January 16, 2018   • Overweight 02/2014    Resolved:  February 16, 2016   • PONV (postoperative nausea and vomiting)     with fever of 101-102-treated with fluids s/p surgery-denied any other side effects with anesthesia   • Sebaceous cyst 11/15/2010     Social History     Socioeconomic History   • Marital status: /Civil Union     Spouse name: Not on file   • Number of children: Not on file   • Years of education: Not on file   • Highest education level: Not on file   Occupational History   • Not on file   Tobacco Use   • Smoking status: Never   • Smokeless tobacco: Never   Vaping Use   • Vaping Use: Never used   Substance and Sexual Activity   • Alcohol use: Yes     Comment: socially   • Drug use: No   • Sexual activity: Not on file   Other Topics Concern   • Not on file   Social History Narrative   • Not on file     Social Determinants of Health     Financial Resource Strain: Not on file   Food Insecurity: Not on file   Transportation Needs: Not on file   Physical Activity: Not on file   Stress: Not on file   Social Connections: Not on file   Intimate Partner Violence: Not on file   Housing Stability: Not on file      Family History   Problem Relation Age of Onset   • Arthritis Mother    • Hyperlipidemia Mother    • Hypertension Father    • Prostate cancer Father    • Cancer Family    • Diabetes Family    • Heart disease Family    • Other Family         hyperlipoprotein     Past Surgical History:   Procedure Laterality Date   • FOOT SURGERY Left 2003   • TONSILLECTOMY     • WISDOM TOOTH EXTRACTION     • WRIST SURGERY Left 2003    titanium plate       Current Outpatient Medications:   •  amLODIPine (NORVASC) 2 5 mg tablet, Take 1 tablet (2 5 mg total) by mouth daily at bedtime, Disp: 30 tablet, Rfl: 1  •  aspirin (ECOTRIN LOW STRENGTH) 81 mg EC tablet, Take 1 tablet (81 mg total) by mouth daily, Disp: 30 tablet, Rfl: 0  •  atorvastatin (LIPITOR) 20 mg tablet, Take 1 tablet (20 mg total) by mouth daily at bedtime, Disp: 30 tablet, Rfl: 1  •  Calcium Polycarbophil (FIBER-CAPS PO), 2 (two) times a day PGX, Disp: , Rfl:   •  cholecalciferol (VITAMIN D3) 1,000 units tablet, Take 1,000 Units by mouth 2 (two) times a week, Disp: , Rfl:   •  multivitamin (THERAGRAN) TABS, Take 1 tablet by mouth daily, Disp: , Rfl:   •  nebivolol (BYSTOLIC) 5 mg tablet, Take 1 tablet (5 mg total) by mouth daily, Disp: 90 tablet, Rfl: 3  •  Omega-3 Fatty Acids (Fish Oil) 1200 MG CAPS, Take by mouth in the morning, Disp: , Rfl:   No Known Allergies    Review of Systems:  Review of Systems   Constitutional: Negative  HENT: Negative  Eyes: Negative  Respiratory: Negative  Cardiovascular: Positive for palpitations  Gastrointestinal: Negative  Endocrine: Negative  Genitourinary: Negative  Musculoskeletal: Negative  Skin: Negative  Allergic/Immunologic: Negative  Neurological: Negative  Hematological: Negative  Psychiatric/Behavioral: Negative          Vitals:    06/05/23 1507   BP: 138/90 "  BP Location: Right arm   Patient Position: Sitting   Cuff Size: Large   Pulse: (!) 46   SpO2: 99%   Weight: 127 kg (281 lb)   Height: 6' 2\" (1 88 m)     Physical Exam:  Physical Exam  Vitals and nursing note reviewed  Constitutional:       General: He is not in acute distress  Appearance: He is not diaphoretic  HENT:      Head: Normocephalic and atraumatic  Right Ear: External ear normal       Left Ear: External ear normal    Eyes:      General: No scleral icterus  Right eye: No discharge  Left eye: No discharge  Conjunctiva/sclera: Conjunctivae normal       Pupils: Pupils are equal, round, and reactive to light  Neck:      Thyroid: No thyromegaly  Vascular: No JVD  Trachea: No tracheal deviation  Cardiovascular:      Rate and Rhythm: Normal rate and regular rhythm  Heart sounds: No murmur heard  No friction rub  Gallop present  Pulmonary:      Effort: Pulmonary effort is normal  No respiratory distress  Breath sounds: Normal breath sounds  No stridor  No wheezing or rales  Chest:      Chest wall: No tenderness  Abdominal:      General: Bowel sounds are normal  There is no distension  Palpations: Abdomen is soft  There is no mass  Tenderness: There is no abdominal tenderness  There is no guarding or rebound  Musculoskeletal:         General: No tenderness or deformity  Normal range of motion  Cervical back: Normal range of motion and neck supple  Skin:     General: Skin is warm and dry  Coloration: Skin is not pale  Findings: No erythema or rash  Neurological:      Mental Status: He is alert and oriented to person, place, and time  Cranial Nerves: No cranial nerve deficit  Motor: No abnormal muscle tone  Coordination: Coordination normal       Deep Tendon Reflexes: Reflexes are normal and symmetric  Psychiatric:         Mood and Affect: Mood is anxious           Behavior: Behavior normal          " Thought Content: Thought content normal          Judgment: Judgment normal          Labs:     Lab Results   Component Value Date    HCT 46 7 2023    HGB 16 0 2023    MCV 92 2023     2023    WBC 5 40 2023     Lab Results   Component Value Date    ALKPHOS 64 2022    ALT 38 2023    AST 27 2023    BILITOT 1 1 01/10/2018    BUN 16 2023    CALCIUM 7 7 (L) 2023     (H) 2023    CO2 22 2023    CREATININE 0 73 2023    EGFR 110 2023    GLUCOSE 94 01/10/2018    K 3 2 (L) 2023     01/10/2018    PROT 6 9 01/10/2018     Lab Results   Component Value Date    CHOL 188 01/10/2018    CHOL 213 (H) 2017    CHOL 176 2016     Lab Results   Component Value Date    HDL 52 2023    HDL 51 01/10/2023    HDL 45 2020     Lab Results   Component Value Date    LDLCALC 84 2023    LDLCALC 111 (H) 01/10/2023    LDLCALC 78 2020     Lab Results   Component Value Date    TRIG 87 2023    TRIG 161 (H) 01/10/2023    TRIG 105 2020     Lab Results   Component Value Date    HGBA1C 5 3 2023       Imaging & Testing   I have personally reviewed pertinent reports  Marked sinus bradycardia qtc 420    Cardiac testing:   Results for orders placed during the hospital encounter of 02/15/18   Echo complete with contrast if indicated    Narrative An 39  [address] [facility] [address]  [phone]    Transthoracic Echocardiogram  2D, M-mode, Doppler, and Color Doppler    Study date:  15-Feb-2018    Patient: Erna Mane  MR number: [id number]  Account number: [de-identified]  : 1975  Age: 43 years  Gender: Male  Status: Routine  Location: Emergency room  Height: 75 in  Weight: 273 5 lb  BP: 27342/ 73 mmHg    Indications:  A Fib , Abnormal heart sound    Diagnoses: I48 0 - Atrial fibrillation    Sonographer:  TIFFANIE Gallego  Primary Physician:  Magui Avina  Erum Vanessa:  [facility]  Interpreting Physician:  Tori Byrne MD    SUMMARY    LEFT VENTRICLE:  Systolic function was normal by Teichholz  Ejection fraction was estimated in the range of 50 % to 55 % to be 52 %  There were no regional wall motion abnormalities  Wall thickness was at the upper limits of normal     LEFT ATRIUM:  The atrium was mildly to moderately dilated  RIGHT ATRIUM:  The atrium was mildly dilated  HISTORY: PRIOR HISTORY: HTN, Hyperlipidemia    PROCEDURE: The procedure was performed in the emergency room  This was a routine study  The transthoracic approach was used  The study included complete 2D imaging, M-mode, complete spectral Doppler, and color Doppler  The heart rate was  51 bpm, at the start of the study  Images were obtained from the parasternal, apical, subcostal, and suprasternal notch acoustic windows  Image quality was adequate  LEFT VENTRICLE: Size was normal  Systolic function was normal by Teichholz  Ejection fraction was estimated in the range of 50 % to 55 % to be 52 %  There were no regional wall motion abnormalities  Wall thickness was at the upper limits  of normal  No evidence of apical thrombus  DOPPLER: Left ventricular diastolic function parameters were normal     RIGHT VENTRICLE: The size was normal  Systolic function was normal  Wall thickness was normal     LEFT ATRIUM: The atrium was mildly to moderately dilated  RIGHT ATRIUM: The atrium was mildly dilated  MITRAL VALVE: Valve structure was normal  There was normal leaflet separation  DOPPLER: The transmitral velocity was within the normal range  There was no evidence for stenosis  There was no significant regurgitation  AORTIC VALVE: The valve was trileaflet  Leaflets exhibited mildly increased thickness and normal cuspal separation  DOPPLER: Transaortic velocity was within the normal range  There was no evidence for stenosis   There was no "significant  regurgitation  TRICUSPID VALVE: The valve structure was normal  There was normal leaflet separation  DOPPLER: The transtricuspid velocity was within the normal range  There was no evidence for stenosis  There was no significant regurgitation  PULMONIC VALVE: Leaflets exhibited normal thickness, no calcification, and normal cuspal separation  DOPPLER: The transpulmonic velocity was within the normal range  There was no significant regurgitation  PERICARDIUM: There was no pericardial effusion  The pericardium was normal in appearance  AORTA: The root exhibited normal size  SYSTEMIC VEINS: IVC: The inferior vena cava was normal in size  SYSTEM MEASUREMENT TABLES    2D mode  AoR Diam 2D: 3 4 cm  LA Diam (2D): 4 5 cm  LA/Ao (2D): 1 32  FS (2D Teich): 27 4 %  IVSd (2D): 0 97 cm  LVDEV: 146 cm³  LVEDV MOD BP: 180 cm³  LVESV: 68 8 cm³  LVIDd(2D): 5 47 cm  LVISd (2D): 3 97 cm  LVOT Area 2D: 3 14 cm squared  LVPWd (2D): 1 11 cm  SV (Teich): 77 2 cm³    Apical four chamber  LVEF A4C: 54 %    Apical two chamber  LA Area: 26 4 cm squared  LA Volume: 99 cm³  LVEF A2C: 52 %    Unspecified Scan Mode  NATALIE Cont Eq (Peak Bonifacio): 2 61 cm squared  LVOT Diam : 2 cm  LVOT Vmax: 1170 mm/s  LVOT Vmax; Mean: 1170 mm/s  Peak Grad ; Mean: 5 mm[Hg]  MV Peak A Bonifacio: 578 mm/s  MV Peak E Bonifacio  Mean: 884 mm/s  MVA (PHT): 4 31 cm squared  PHT: 51 ms  RA Area: 19 6 cm squared  RA Volume: 57 4 cm³  TAPSE: 2 3 cm    [employer] Accredited Echocardiography Laboratory    Prepared and electronically signed by    Ravi Ramirez MD  Signed 15-Feb-2018 10:12:16           Ravi Ramirez MD [facility]  Please call with any questions or suggestions    A description of the counseling: ablation vs medical management  Goals and Barriers:  Patient's ability to self care:  Medication side effect reviewed with patient in detail and all their questions answered      \"This note has been constructed using a voice recognition " "system  Therefore there may be syntax, spelling, and/or grammatical errors  Please call if you have any questions   \"    "

## 2023-06-05 NOTE — PATIENT INSTRUCTIONS
Potassium Content of Foods List   AMBULATORY CARE:   Potassium  is a mineral that is found in most foods  Potassium helps to balance fluids and minerals in your body  It also helps your body maintain a normal blood pressure  Potassium helps your muscles contract and your nerves function normally  Why you may need to change the amount of potassium you eat: You may need more potassium  if you have hypokalemia (low potassium levels) or high blood pressure  You may also need more potassium if you are taking diuretics  Diuretics and certain medicines cause your body to lose potassium  You may need less potassium  in your diet if you have hyperkalemia (high potassium levels) or kidney disease  Potassium content of fruit:  The amount of potassium in milligrams (mg) contained in each fruit or serving of fruit is listed beside the item    High-potassium foods (more than 200 mg per serving):      1 medium banana (425)    ½ of a papaya (390)    ½ cup of prune juice (370)    ¼ cup of raisins (270)    1 medium darius (325) or kiwi (240)    1 small orange (240) or ½ cup of orange juice (235)    ½ cup of cubed cantaloupe (215) or diced honeydew melon (200)    1 medium pear (200)    Medium-potassium foods (50 to 200 mg per serving):      1 medium peach (185)    1 small apple or ½ cup of apple juice (150)    ½ cup of peaches canned in juice (120)    ½ cup of canned pineapple (100)    ½ cup of fresh, sliced strawberries (003)    ½ cup of watermelon (85)    Low-potassium foods (less than 50 mg per serving):      ½ cup of cranberries (45) or cranberry juice cocktail (20)    ½ cup of nectar of papaya, darius, or pear (35)    Potassium content of vegetables:   High-potassium foods (more than 200 mg per serving):      1 medium baked potato, with skin (925)    1 baked medium sweet potato, with skin (450)    ½ cup of tomato or vegetable juice (275), or 1 medium raw tomato (290)    ½ cup of mushrooms (280)    ½ cup of fresh brussels sprouts (250)    ½ cup of cooked zucchini (220) or winter squash (250)    ¼ of a medium avocado (245)    ½ cup of broccoli (230)    Medium-potassium foods (50 to 200 mg per serving):      ½ cup of corn (195)    ½ cup of fresh or cooked carrots (180)    ½ cup of fresh cauliflower (150)    ½ cup of asparagus (155)    ½ cup of canned peas (90)     1 cup of lettuce, all types (100)    ½ cup of fresh green beans (90)    ½ cup of frozen green beans (85)    ½ cup of cucumber (80)    Potassium content of protein foods:   High-potassium foods (more than 200 mg per serving):      ½ cup of cooked stoddard beans (400) or lentils (365)    1 cup of soy milk (300)    3 ounces of baked or broiled salmon (319)    3 ounces of roasted turkey, dark meat (250)    ¼ cup of sunflower seeds (241)    3 ounces of cooked lean beef (224)    2 tablespoons of smooth peanut butter (210)    Medium-potassium foods (50 to 200 mg per serving):      1 ounce of salted peanuts, almonds, or cashews (200)    1 large egg (60 mg)    Potassium content of dairy foods:   High-potassium foods (more than 200 mg per serving):      6 ounces of yogurt (260 to 435)    1 cup of nonfat, low-fat, or whole milk (350 to 380)    Medium-potassium foods (50 to 200 mg per serving):      ½ cup of ricotta cheese (154)    ½ cup of vanilla ice cream (131)    ½ cup of low-fat (2%) cottage cheese (110)    Low-potassium foods (less than 50 mg per serving):      1 ounce of cheese (20 to 30)      Potassium content of grains:   1 slice of white bread (30)    ½ cup of white or brown rice (50)    ½ cup of spaghetti or macaroni (30)    1 flour or corn tortilla (50)    1 four-inch waffle (50)    Potassium content of other foods:   1 tablespoon of molasses (295)    1½ ounces of chocolate (165)    Some salt substitutes may contain a high amount of potassium  Check the food label to find the amount of potassium it contains      © Copyright [employer] 2022 Information is for End User's use only and may not be sold, redistributed or otherwise used for commercial purposes  The above information is an  only  It is not intended as medical advice for individual conditions or treatments  Talk to your doctor, nurse or pharmacist before following any medical regimen to see if it is safe and effective for you

## 2023-08-10 ENCOUNTER — OFFICE VISIT (OUTPATIENT)
Dept: URGENT CARE | Facility: CLINIC | Age: 48
End: 2023-08-10
Payer: COMMERCIAL

## 2023-08-10 ENCOUNTER — APPOINTMENT (OUTPATIENT)
Dept: RADIOLOGY | Facility: CLINIC | Age: 48
End: 2023-08-10
Payer: COMMERCIAL

## 2023-08-10 VITALS
OXYGEN SATURATION: 97 % | BODY MASS INDEX: 38.13 KG/M2 | RESPIRATION RATE: 16 BRPM | HEART RATE: 51 BPM | WEIGHT: 297 LBS | TEMPERATURE: 97.2 F

## 2023-08-10 DIAGNOSIS — S69.92XA INJURY, THUMB, LEFT, INITIAL ENCOUNTER: ICD-10-CM

## 2023-08-10 DIAGNOSIS — S62.512A CLOSED FRACTURE OF BASE OF PROXIMAL PHALANX OF LEFT THUMB: Primary | ICD-10-CM

## 2023-08-10 PROCEDURE — 99214 OFFICE O/P EST MOD 30 MIN: CPT | Performed by: FAMILY MEDICINE

## 2023-08-10 PROCEDURE — 73140 X-RAY EXAM OF FINGER(S): CPT

## 2023-08-10 NOTE — PROGRESS NOTES
[facility]        NAME: Nick Acevedo is a 52 y.o. male  : 1975    MRN: [id number]  DATE: August 10, 2023  TIME: 4:22 PM    Assessment and Plan   Closed fracture of base of proximal phalanx of left thumb [S62.512A]  1. Closed fracture of base of proximal phalanx of left thumb  XR thumb left first digit-min 2v    Ambulatory Referral to Hand Surgery    CANCELED: Ambulatory Referral to Hand Surgery            Patient Instructions     Avulsion fracture of the UCL attachment on the left thumb. Thumb spica brace applied and referred to ortho hand. Proceed to  ER if symptoms worsen. Chief Complaint     Chief Complaint   Patient presents with   • Pain     Pt presents with left hand/thumb injury r/t soft ball colliding with tag out of opponent// states he felt his thumb being pulled back, today swollen, and bruised, limited ROM, numbness in thumb, not able to grasp items; History of Present Illness       52year old male presents today complaining of left thumb pain. He states that he was playing softball and tried to tag out an opponent, when his thumb bent backwards. He notes pain with movement of the thumb and swelling. Review of Systems   Review of Systems   Constitutional: Negative for chills, fatigue and fever. HENT: Negative for postnasal drip and sore throat. Respiratory: Negative for cough and shortness of breath. Cardiovascular: Negative for chest pain and palpitations. Gastrointestinal: Negative for abdominal pain, nausea and vomiting. Genitourinary: Negative for dysuria. Musculoskeletal: Positive for joint swelling. Negative for gait problem. Skin: Negative for rash. Neurological: Negative for dizziness, syncope, light-headedness, numbness and headaches. Psychiatric/Behavioral: Negative for agitation and confusion. All other systems reviewed and are negative.         Current Medications       Current Outpatient Medications:   •  amLODIPine (NORVASC) 2.5 mg tablet, Take 1 tablet (2.5 mg total) by mouth daily at bedtime, Disp: 30 tablet, Rfl: 1  •  aspirin (ECOTRIN LOW STRENGTH) 81 mg EC tablet, Take 1 tablet (81 mg total) by mouth daily, Disp: 30 tablet, Rfl: 0  •  atorvastatin (LIPITOR) 20 mg tablet, Take 1 tablet (20 mg total) by mouth daily at bedtime, Disp: 30 tablet, Rfl: 1  •  Calcium Polycarbophil (FIBER-CAPS PO), 2 (two) times a day PGX, Disp: , Rfl:   •  cholecalciferol (VITAMIN D3) 1,000 units tablet, Take 1,000 Units by mouth 2 (two) times a week, Disp: , Rfl:   •  multivitamin (THERAGRAN) TABS, Take 1 tablet by mouth daily, Disp: , Rfl:   •  nebivolol (BYSTOLIC) 5 mg tablet, Take 1 tablet (5 mg total) by mouth daily, Disp: 90 tablet, Rfl: 3  •  Omega-3 Fatty Acids (Fish Oil) 1200 MG CAPS, Take by mouth in the morning, Disp: , Rfl:     Current Allergies     Allergies as of 08/10/2023   • (No Known Allergies)            The following portions of the patient's history were reviewed and updated as appropriate: allergies, current medications, past family history, past medical history, past social history, past surgical history and problem list.     Past Medical History:   Diagnosis Date   • Atrial fibrillation ([address])     summer 0f 2022 x1(dehydration caused a fib) episode-previously 2019 had afib   • Finger deformity, left 02/16/2016    Resolved:  September 22, 2017   • Fracture of finger, left, closed 05/26/2016    Resolved:  September 22, 2017   • Fracture of proximal phalanx of lesser toe of left foot 08/29/2016    Initial encounter:  Resolved:  September 22, 2017   • H/O vitamin D deficiency    • Hyperlipidemia    • Hypertension    • Neoplasm of uncertain behavior of skin 08/02/2012    Resolved:  February 16, 2016   • Obesity 02/16/2016    Resolved:  January 16, 2018   • Overweight 02/2014    Resolved:  February 16, 2016   • PONV (postoperative nausea and vomiting)     with fever of 101-102-treated with fluids s/p surgery-denied any other side effects with anesthesia   • Sebaceous cyst 11/15/2010       Past Surgical History:   Procedure Laterality Date   • FOOT SURGERY Left 2003   • TONSILLECTOMY     • WISDOM TOOTH EXTRACTION     • WRIST SURGERY Left 2003    titanium plate       Family History   Problem Relation Age of Onset   • Arthritis Mother    • Hyperlipidemia Mother    • Hypertension Father    • Prostate cancer Father    • Cancer Family    • Diabetes Family    • Heart disease Family    • Other Family         hyperlipoprotein         Medications have been verified. Objective   Pulse (!) 51   Temp (!) 97.2 °F (36.2 °C)   Resp 16   Wt 135 kg (297 lb)   SpO2 97%   BMI 38.13 kg/m²   No LMP for male patient. Physical Exam     Physical Exam  Vitals reviewed. Constitutional:       General: He is not in acute distress. Appearance: Normal appearance. He is not ill-appearing. HENT:      Head: Normocephalic and atraumatic. Eyes:      Extraocular Movements: Extraocular movements intact. Conjunctiva/sclera: Conjunctivae normal.   Musculoskeletal:         General: Tenderness and signs of injury present. Right hand: Bony tenderness (proximal portion of proximal phalanx) present. Cervical back: Normal range of motion. Comments: UCL laxity not assessed, as there is a visible fracture noted on xray at the insertion of the UCL. No scaphoid tenderness today. Skin:     General: Skin is warm. Neurological:      General: No focal deficit present. Mental Status: He is alert.    Psychiatric:         Mood and Affect: Mood normal.         Behavior: Behavior normal.         Judgment: Judgment normal.

## 2023-08-11 ENCOUNTER — TELEPHONE (OUTPATIENT)
Age: 48
End: 2023-08-11

## 2023-08-11 NOTE — TELEPHONE ENCOUNTER
Patient is being referred to a orthopedics. Please schedule accordingly.     [address] [facility]   [phone]

## 2023-08-16 ENCOUNTER — OFFICE VISIT (OUTPATIENT)
Dept: OBGYN CLINIC | Facility: CLINIC | Age: 48
End: 2023-08-16

## 2023-08-16 ENCOUNTER — APPOINTMENT (OUTPATIENT)
Dept: RADIOLOGY | Facility: CLINIC | Age: 48
End: 2023-08-16
Payer: COMMERCIAL

## 2023-08-16 VITALS
SYSTOLIC BLOOD PRESSURE: 154 MMHG | HEIGHT: 74 IN | HEART RATE: 48 BPM | BODY MASS INDEX: 38.12 KG/M2 | WEIGHT: 297 LBS | DIASTOLIC BLOOD PRESSURE: 90 MMHG

## 2023-08-16 DIAGNOSIS — S62.512A CLOSED FRACTURE OF BASE OF PROXIMAL PHALANX OF LEFT THUMB: ICD-10-CM

## 2023-08-16 DIAGNOSIS — S63.642A SPRAIN OF METACARPOPHALANGEAL (MCP) JOINT OF LEFT THUMB, INITIAL ENCOUNTER: Primary | ICD-10-CM

## 2023-08-16 PROCEDURE — 73140 X-RAY EXAM OF FINGER(S): CPT

## 2023-08-16 NOTE — PROGRESS NOTES
Assessment/Plan:  1. Sprain of metacarpophalangeal (MCP) joint of left thumb, initial encounter  MRI thumb left wo contrast      2. Closed fracture of base of proximal phalanx of left thumb  Ambulatory Referral to Hand Surgery    XR thumb left first digit-min 2v        The patient has a small fracture of the ulnar aspect of the MCP joint of the thumb. I do also have concern for possible UCL tear. I am ordering a stat MRI to rule this out. The patient will continue his thumb spica brace at all times. He will follow-up with our hand surgeon, Dr. Henry Newell, after his MRI to discuss the results and how to proceed. He will refrain from any sporting type activities or lifting with the left hand for the time being. Subjective:   Velia Lo is a 52 y.o. male who presents today for evaluation of his left thumb. The patient was playing softball over the weekend and got his thumb caught and pulled back. He did have pain about the MCP joint after this injury. He went to urgent care and had x-rays which showed a small avulsion fracture about to the ulnar aspect of the MCP joint. I am able to view these images today. The patient has been in a thumb spica brace since that time. He notes ongoing pain about the ulnar aspect of the thumb MCP joint, which is worse with attempts at movement and better with rest.  He also complains of some swelling and bruising in this region. He denies any paresthesias of the hand. The patient is right-hand dominant. Review of Systems   Constitutional: Negative. Negative for chills and fever. HENT: Negative. Negative for ear pain and sore throat. Eyes: Negative. Negative for pain and redness. Respiratory: Negative. Negative for shortness of breath and wheezing. Cardiovascular: Negative for chest pain and palpitations. Gastrointestinal: Negative. Negative for abdominal pain and blood in stool. Endocrine: Negative. Negative for polydipsia and polyuria. Genitourinary: Negative. Negative for difficulty urinating and dysuria. Musculoskeletal:        As noted in HPI   Skin: Negative. Negative for pallor and rash. Neurological: Negative. Negative for dizziness and numbness. Hematological: Negative. Negative for adenopathy. Does not bruise/bleed easily. Psychiatric/Behavioral: Negative. Negative for confusion and suicidal ideas.          Past Medical History:   Diagnosis Date   • Atrial fibrillation [facility])     summer 0f 2022 x1(dehydration caused a fib) episode-previously 2019 had afib   • Finger deformity, left 02/16/2016    Resolved:  September 22, 2017   • Fracture of finger, left, closed 05/26/2016    Resolved:  September 22, 2017   • Fracture of proximal phalanx of lesser toe of left foot 08/29/2016    Initial encounter:  Resolved:  September 22, 2017   • H/O vitamin D deficiency    • Hyperlipidemia    • Hypertension    • Neoplasm of uncertain behavior of skin 08/02/2012    Resolved:  February 16, 2016   • Obesity 02/16/2016    Resolved:  January 16, 2018   • Overweight 02/2014    Resolved:  February 16, 2016   • PONV (postoperative nausea and vomiting)     with fever of 101-102-treated with fluids s/p surgery-denied any other side effects with anesthesia   • Sebaceous cyst 11/15/2010       Past Surgical History:   Procedure Laterality Date   • FOOT SURGERY Left 2003   • TONSILLECTOMY     • WISDOM TOOTH EXTRACTION     • WRIST SURGERY Left 2003    titanium plate       Family History   Problem Relation Age of Onset   • Arthritis Mother    • Hyperlipidemia Mother    • Hypertension Father    • Prostate cancer Father    • Cancer Family    • Diabetes Family    • Heart disease Family    • Other Family         hyperlipoprotein       Social History     Occupational History   • Not on file   Tobacco Use   • Smoking status: Never     Passive exposure: Past   • Smokeless tobacco: Never   Vaping Use   • Vaping Use: Never used   Substance and Sexual Activity   • Alcohol use: Not Currently     Comment: No lcoholic beverages since January 2023   • Drug use: Never   • Sexual activity: Yes     Partners: Female     Birth control/protection: None         Current Outpatient Medications:   •  amLODIPine (NORVASC) 2.5 mg tablet, Take 1 tablet (2.5 mg total) by mouth daily at bedtime, Disp: 30 tablet, Rfl: 1  •  aspirin (ECOTRIN LOW STRENGTH) 81 mg EC tablet, Take 1 tablet (81 mg total) by mouth daily, Disp: 30 tablet, Rfl: 0  •  atorvastatin (LIPITOR) 20 mg tablet, Take 1 tablet (20 mg total) by mouth daily at bedtime, Disp: 30 tablet, Rfl: 1  •  Calcium Polycarbophil (FIBER-CAPS PO), 2 (two) times a day PGX, Disp: , Rfl:   •  cholecalciferol (VITAMIN D3) 1,000 units tablet, Take 1,000 Units by mouth 2 (two) times a week, Disp: , Rfl:   •  multivitamin (THERAGRAN) TABS, Take 1 tablet by mouth daily, Disp: , Rfl:   •  nebivolol (BYSTOLIC) 5 mg tablet, Take 1 tablet (5 mg total) by mouth daily, Disp: 90 tablet, Rfl: 3  •  Omega-3 Fatty Acids (Fish Oil) 1200 MG CAPS, Take by mouth in the morning, Disp: , Rfl:     No Known Allergies    Objective:  Vitals:    08/16/23 1100   BP: 154/90   Pulse: (!) 48            Left Hand Exam     Tenderness   Left hand tenderness location: Tenderness ulnar aspect MCP joint of thumb. Other   Erythema: absent  Sensation: normal  Pulse: present    Comments:  Mild swelling and ecchymosis MCP joint of thumb Laxity UCL of MCP joint of thumb. Physical Exam  Constitutional:       General: He is not in acute distress. Appearance: He is well-developed. HENT:      Head: Normocephalic and atraumatic. Eyes:      General: No scleral icterus. Conjunctiva/sclera: Conjunctivae normal.   Neck:      Vascular: No JVD. Cardiovascular:      Rate and Rhythm: Normal rate. Pulmonary:      Effort: Pulmonary effort is normal. No respiratory distress. Skin:     General: Skin is warm.    Neurological:      Mental Status: He is alert and oriented to person, place, and time. Coordination: Coordination normal.         I have personally reviewed pertinent films in PACS and my interpretation is as follows:  X-rays left thumb from today: Small fracture ulnar aspect of MCP joint. This document was created using speech voice recognition software. Grammatical errors, random word insertions, pronoun errors, and incomplete sentences are an occasional consequence of this system due to software limitations, ambient noise, and hardware issues. Any formal questions or concerns about content, text, or information contained within the body of this dictation should be directly addressed to the provider for clarification.

## 2023-08-17 ENCOUNTER — TELEPHONE (OUTPATIENT)
Dept: OBGYN CLINIC | Facility: CLINIC | Age: 48
End: 2023-08-17

## 2023-08-17 NOTE — TELEPHONE ENCOUNTER
Contacted patient and placed on schedule for Monday with Dr. Donald Pascual   ----- Message from Nash Jimenes PA-C sent at 8/17/2023  2:38 PM EDT -----  The patient does have complete tear of ulnar collateral ligament. Can we please get him in with Dr. Donald Pascual or Dr. Pb Barros as soon as possible. Thank you .  He should continue his brace in the meantime.   ----- Message -----  From: Kapil Gomes  Sent: 8/17/2023   2:30 PM EDT  To: Nash Jimenes PA-C    Patients MRI report left thumb has been scanned into patients chart under media

## 2023-08-21 ENCOUNTER — OFFICE VISIT (OUTPATIENT)
Dept: OBGYN CLINIC | Facility: CLINIC | Age: 48
End: 2023-08-21
Payer: COMMERCIAL

## 2023-08-21 VITALS
SYSTOLIC BLOOD PRESSURE: 136 MMHG | HEART RATE: 60 BPM | WEIGHT: 298 LBS | DIASTOLIC BLOOD PRESSURE: 86 MMHG | BODY MASS INDEX: 38.24 KG/M2 | HEIGHT: 74 IN

## 2023-08-21 DIAGNOSIS — S53.32XA STENER LESION OF LEFT THUMB, INITIAL ENCOUNTER: ICD-10-CM

## 2023-08-21 DIAGNOSIS — S63.642A RUPTURE OF ULNAR COLLATERAL LIGAMENT OF LEFT THUMB, INITIAL ENCOUNTER: Primary | ICD-10-CM

## 2023-08-21 PROCEDURE — 99214 OFFICE O/P EST MOD 30 MIN: CPT | Performed by: ORTHOPAEDIC SURGERY

## 2023-08-21 RX ORDER — CHLORHEXIDINE GLUCONATE 0.12 MG/ML
15 RINSE ORAL ONCE
Status: CANCELLED | OUTPATIENT
Start: 2023-08-23 | End: 2023-08-21

## 2023-08-21 NOTE — H&P (VIEW-ONLY)
Assessment/Plan:  1. Rupture of ulnar collateral ligament of left thumb, initial encounter        2. Stener lesion of left thumb, initial encounter          • Physical examination performed, diagnostic imaging review, and thorough subjective history obtained at today's visit  • Treatment options were discussed which included nonoperative and operative treatment. • Risk, benefits, and realistic expectations following nonoperative and operative treatment were discussed. • The patient was given an opportunity to ask questions. Questions were answered to their satisfaction. • Through shared decision making, the patient decided to move forward with repair of the left thumb ulnar collateral ligament. • Follow up in 2 weeks after surgery    cc: My left thumb hurts    Subjective:   Hanh Pressley is a right hand dominant 52 y.o. male who presents with left thumb pain. He saw Logan Olmstead PA-C on 8/16/2023 and was ordered an MRI that revealed a full tear of the ulnar collateral ligament of the left thumb metacarpophalangeal joint. He was playing softball on 8/9/2023 and went to tag someone and his hand was forced into extension. He recalls feeling a "pop." He described the pain after the initial injury as throbbing 8/10. He believed the thumb was dislocated so he went to [facility] on 8/10/2023 and received an X-ray. At that time, he was told the thumb was fractured and was given a thumb spica splint. Today, he describes his pain as dull and achy 2/10. He indicates his pain is located over the ulnar collateral ligament of the metacarpophalangeal joint of the left thumb. He has been taking Ibuprofen as needed for pain. He reports numbness and tingling at the time of injury. Review of Systems   Constitutional: Negative for chills and fever. HENT: Negative for ear pain and sore throat. Eyes: Negative for pain and visual disturbance. Respiratory: Negative for cough and shortness of breath.     Cardiovascular: Negative for chest pain and palpitations. Gastrointestinal: Negative for abdominal pain and vomiting. Genitourinary: Negative for dysuria and hematuria. Musculoskeletal: Positive for arthralgias and joint swelling (left thumb). Negative for back pain. Skin: Negative for color change and rash. Neurological: Negative for seizures and syncope. All other systems reviewed and are negative.     Past Medical History:   Diagnosis Date   • Atrial fibrillation [facility])     summer 0f 2022 x1(dehydration caused a fib) episode-previously 2019 had afib   • Finger deformity, left 02/16/2016    Resolved:  September 22, 2017   • Fracture of finger, left, closed 05/26/2016    Resolved:  September 22, 2017   • Fracture of proximal phalanx of lesser toe of left foot 08/29/2016    Initial encounter:  Resolved:  September 22, 2017   • H/O vitamin D deficiency    • Hyperlipidemia    • Hypertension    • Neoplasm of uncertain behavior of skin 08/02/2012    Resolved:  February 16, 2016   • Obesity 02/16/2016    Resolved:  January 16, 2018   • Overweight 02/2014    Resolved:  February 16, 2016   • PONV (postoperative nausea and vomiting)     with fever of 101-102-treated with fluids s/p surgery-denied any other side effects with anesthesia   • Sebaceous cyst 11/15/2010       Past Surgical History:   Procedure Laterality Date   • FOOT SURGERY Left 2003   • TONSILLECTOMY     • WISDOM TOOTH EXTRACTION     • WRIST SURGERY Left 2003    titanium plate       Family History   Problem Relation Age of Onset   • Arthritis Mother    • Hyperlipidemia Mother    • Hypertension Father    • Prostate cancer Father    • Cancer Family    • Diabetes Family    • Heart disease Family    • Other Family         hyperlipoprotein       Social History     Occupational History   • Not on file   Tobacco Use   • Smoking status: Never     Passive exposure: Past   • Smokeless tobacco: Never   Vaping Use   • Vaping Use: Never used   Substance and Sexual Activity   • Alcohol use: Not Currently     Comment: No lcoholic beverages since January 2023   • Drug use: Never   • Sexual activity: Yes     Partners: Female     Birth control/protection: None       Current Outpatient Medications:   •  amLODIPine (NORVASC) 2.5 mg tablet, Take 1 tablet (2.5 mg total) by mouth daily at bedtime, Disp: 30 tablet, Rfl: 1  •  aspirin (ECOTRIN LOW STRENGTH) 81 mg EC tablet, Take 1 tablet (81 mg total) by mouth daily, Disp: 30 tablet, Rfl: 0  •  atorvastatin (LIPITOR) 20 mg tablet, Take 1 tablet (20 mg total) by mouth daily at bedtime, Disp: 30 tablet, Rfl: 1  •  Calcium Polycarbophil (FIBER-CAPS PO), 2 (two) times a day PGX, Disp: , Rfl:   •  cholecalciferol (VITAMIN D3) 1,000 units tablet, Take 1,000 Units by mouth 2 (two) times a week, Disp: , Rfl:   •  multivitamin (THERAGRAN) TABS, Take 1 tablet by mouth daily, Disp: , Rfl:   •  nebivolol (BYSTOLIC) 5 mg tablet, Take 1 tablet (5 mg total) by mouth daily, Disp: 90 tablet, Rfl: 3  •  Omega-3 Fatty Acids (Fish Oil) 1200 MG CAPS, Take by mouth in the morning, Disp: , Rfl:     No Known Allergies    Objective:  Vitals:    08/21/23 0903   BP: 136/86   Pulse: 60     The patient was awake, alert, and oriented to person, place, and time. No acute distress. Normocephalic. EOMI. Mucous membranes moist.  Normal respiratory effort. Examination of the affected extremity was compared to the unaffected extremity. Skin was intact. No swelling or ecchymosis. No deformity. Hand and fingers were warm and well-perfused. Capillary refill was brisk. Full active range of motion of the elbows, forearms, wrists, and fingers. Laxity at the MCP joint of left thumb at 30 degrees of flexion. Tenderness to palpation of along the ulnar border of the thumb MCP joint. Imaging/Diagnostic Studies:    I reviewed imaging studies dated 8/10/2016 and 8/16/2023 which included 3 views of the left hand. .  These images studies demonstrated a very small avulsion fracture at the insertion of the ulnar collateral ligament at the base of the proximal phalanx. An MRI was done. Images were not available for my review at the office exam; however, the report indicated the presence of a Stener lesion. Scribe Attestation    I,:  Bruce Peters am acting as a scribe while in the presence of the attending physician.:       I,:  Ruth Lee MD personally performed the services described in this documentation    as scribed in my presence.:         This document was created using speech voice recognition software. Grammatical errors, random word insertions, pronoun errors, and incomplete sentences are an occasional consequence of this system due to software limitations, ambient noise, and hardware issues. Any formal questions or concerns about content, text, or information contained within the body of this dictation should be directly addressed to the provider for clarification.

## 2023-08-21 NOTE — PROGRESS NOTES
Assessment/Plan:  1. Rupture of ulnar collateral ligament of left thumb, initial encounter        2. Stener lesion of left thumb, initial encounter          • Physical examination performed, diagnostic imaging review, and thorough subjective history obtained at today's visit  • Treatment options were discussed which included nonoperative and operative treatment. • Risk, benefits, and realistic expectations following nonoperative and operative treatment were discussed. • The patient was given an opportunity to ask questions. Questions were answered to their satisfaction. • Through shared decision making, the patient decided to move forward with repair of the left thumb ulnar collateral ligament. • Follow up in 2 weeks after surgery    cc: My left thumb hurts    Subjective:   Shantel Ratliff is a right hand dominant 52 y.o. male who presents with left thumb pain. He saw Hilario Dudley PA-C on 8/16/2023 and was ordered an MRI that revealed a full tear of the ulnar collateral ligament of the left thumb metacarpophalangeal joint. He was playing softball on 8/9/2023 and went to tag someone and his hand was forced into extension. He recalls feeling a "pop." He described the pain after the initial injury as throbbing 8/10. He believed the thumb was dislocated so he went to [facility] on 8/10/2023 and received an X-ray. At that time, he was told the thumb was fractured and was given a thumb spica splint. Today, he describes his pain as dull and achy 2/10. He indicates his pain is located over the ulnar collateral ligament of the metacarpophalangeal joint of the left thumb. He has been taking Ibuprofen as needed for pain. He reports numbness and tingling at the time of injury. Review of Systems   Constitutional: Negative for chills and fever. HENT: Negative for ear pain and sore throat. Eyes: Negative for pain and visual disturbance. Respiratory: Negative for cough and shortness of breath.     Cardiovascular: Negative for chest pain and palpitations. Gastrointestinal: Negative for abdominal pain and vomiting. Genitourinary: Negative for dysuria and hematuria. Musculoskeletal: Positive for arthralgias and joint swelling (left thumb). Negative for back pain. Skin: Negative for color change and rash. Neurological: Negative for seizures and syncope. All other systems reviewed and are negative.     Past Medical History:   Diagnosis Date   • Atrial fibrillation [facility])     summer 0f 2022 x1(dehydration caused a fib) episode-previously 2019 had afib   • Finger deformity, left 02/16/2016    Resolved:  September 22, 2017   • Fracture of finger, left, closed 05/26/2016    Resolved:  September 22, 2017   • Fracture of proximal phalanx of lesser toe of left foot 08/29/2016    Initial encounter:  Resolved:  September 22, 2017   • H/O vitamin D deficiency    • Hyperlipidemia    • Hypertension    • Neoplasm of uncertain behavior of skin 08/02/2012    Resolved:  February 16, 2016   • Obesity 02/16/2016    Resolved:  January 16, 2018   • Overweight 02/2014    Resolved:  February 16, 2016   • PONV (postoperative nausea and vomiting)     with fever of 101-102-treated with fluids s/p surgery-denied any other side effects with anesthesia   • Sebaceous cyst 11/15/2010       Past Surgical History:   Procedure Laterality Date   • FOOT SURGERY Left 2003   • TONSILLECTOMY     • WISDOM TOOTH EXTRACTION     • WRIST SURGERY Left 2003    titanium plate       Family History   Problem Relation Age of Onset   • Arthritis Mother    • Hyperlipidemia Mother    • Hypertension Father    • Prostate cancer Father    • Cancer Family    • Diabetes Family    • Heart disease Family    • Other Family         hyperlipoprotein       Social History     Occupational History   • Not on file   Tobacco Use   • Smoking status: Never     Passive exposure: Past   • Smokeless tobacco: Never   Vaping Use   • Vaping Use: Never used   Substance and Sexual Activity   • Alcohol use: Not Currently     Comment: No lcoholic beverages since January 2023   • Drug use: Never   • Sexual activity: Yes     Partners: Female     Birth control/protection: None       Current Outpatient Medications:   •  amLODIPine (NORVASC) 2.5 mg tablet, Take 1 tablet (2.5 mg total) by mouth daily at bedtime, Disp: 30 tablet, Rfl: 1  •  aspirin (ECOTRIN LOW STRENGTH) 81 mg EC tablet, Take 1 tablet (81 mg total) by mouth daily, Disp: 30 tablet, Rfl: 0  •  atorvastatin (LIPITOR) 20 mg tablet, Take 1 tablet (20 mg total) by mouth daily at bedtime, Disp: 30 tablet, Rfl: 1  •  Calcium Polycarbophil (FIBER-CAPS PO), 2 (two) times a day PGX, Disp: , Rfl:   •  cholecalciferol (VITAMIN D3) 1,000 units tablet, Take 1,000 Units by mouth 2 (two) times a week, Disp: , Rfl:   •  multivitamin (THERAGRAN) TABS, Take 1 tablet by mouth daily, Disp: , Rfl:   •  nebivolol (BYSTOLIC) 5 mg tablet, Take 1 tablet (5 mg total) by mouth daily, Disp: 90 tablet, Rfl: 3  •  Omega-3 Fatty Acids (Fish Oil) 1200 MG CAPS, Take by mouth in the morning, Disp: , Rfl:     No Known Allergies    Objective:  Vitals:    08/21/23 0903   BP: 136/86   Pulse: 60     The patient was awake, alert, and oriented to person, place, and time. No acute distress. Normocephalic. EOMI. Mucous membranes moist.  Normal respiratory effort. Examination of the affected extremity was compared to the unaffected extremity. Skin was intact. No swelling or ecchymosis. No deformity. Hand and fingers were warm and well-perfused. Capillary refill was brisk. Full active range of motion of the elbows, forearms, wrists, and fingers. Laxity at the MCP joint of left thumb at 30 degrees of flexion. Tenderness to palpation of along the ulnar border of the thumb MCP joint. Imaging/Diagnostic Studies:    I reviewed imaging studies dated 8/10/2016 and 8/16/2023 which included 3 views of the left hand. .  These images studies demonstrated a very small avulsion fracture at the insertion of the ulnar collateral ligament at the base of the proximal phalanx. An MRI was done. Images were not available for my review at the office exam; however, the report indicated the presence of a Stener lesion. Scribe Attestation    I,:  Bernadette Connor am acting as a scribe while in the presence of the attending physician.:       I,:  Arias Trinidad MD personally performed the services described in this documentation    as scribed in my presence.:         This document was created using speech voice recognition software. Grammatical errors, random word insertions, pronoun errors, and incomplete sentences are an occasional consequence of this system due to software limitations, ambient noise, and hardware issues. Any formal questions or concerns about content, text, or information contained within the body of this dictation should be directly addressed to the provider for clarification.

## 2023-08-22 ENCOUNTER — TELEPHONE (OUTPATIENT)
Age: 48
End: 2023-08-22

## 2023-08-22 NOTE — TELEPHONE ENCOUNTER
Caller: Patient    Doctor: Lisa Julian    Reason for call: Pt wondering if surgery was approved by insurance. Call warm transferred to [facility] in [facility] for further assistance.     Call back#: [phone]

## 2023-08-22 NOTE — PRE-PROCEDURE INSTRUCTIONS
My Surgical Experience    The following information was developed to assist you to prepare for your operation. What do I need to do before coming to the hospital?  • Arrange for a responsible person to drive you to and from the hospital.  • Arrange care for your children at home. Children are not allowed in the recovery areas of the hospital  • Plan to wear clothing that is easy to put on and take off. If you are having shoulder surgery, wear a shirt that buttons or zippers in the front  Bathing  o Shower the evening before and the morning of your surgery with an antibacterial soap. Please refer to the Pre Op Showering Instructions for Surgery Patients Sheet.  o Remove nail polish and all body piercing jewelry  o Do not shave any body part for at least 24 hours before surgery-this includes face, arms, legs and upper body  Food  o Nothing to eat or drink after midnight the night before your surgery. This includes candy and chewing gum  o Exception: If your surgery is after 12:00pm (noon), you may have clear liquids such as 7-Up®, ginger ale, apple or cranberry juice, Jell-O®, water, or clear broth until 8:00 am  o Do not drink milk or juice with pulp on the morning before surgery  o Do not drink alcohol 24 hours before surgery  Medicine  o Follow instructions you received from your surgeon about which medicines you may take on the day of surgery  o If instructed to take medicine on the morning of surgery, take pills with just a small sip of water. Call your prescribing doctor for specific infroamtion on what to do if you take insulin    What should I bring to the hospital?    Bring:  • Crutches or a walker, if you have them, for foot or knee surgery  • A list of the daily medicines, vitamins, minerals, herbals and nutritional supplements you take.  Include the dosages of medicines and the time you take them each day  • Glasses, dentures or hearing aids  • Minimal clothing; you will be wearing hospital sleepwear  • Photo ID; required to verify your identity  • If you have a Living Will or Power of , bring a copy of the documents  • If you have an ostomy, bring an extra pouch and any supplies you use    Do not bring  • Medicines or inhalers  • Money, valuables or jewelry    What other information should I know about the day of surgery? • Notify your surgeons if you develop a cold, sore throat, cough, fever, rash or any other illness. • Report to the Ambulatory Surgical/Same Day Surgery Unit  • You will be instructed to stop at Registration only if you have not been pre-registered  • Inform your  fi they do not stay that they will be asked by the staff to leave a phone number where they can be reached  • Be available to be reached before surgery. In the event the operating room schedule changes, you may be asked to come in earlier or later than expected    *It is important to tell your doctor and others involved in your health care if you are taking or have been taking any non-prescription drugs, vitamins, minerals, herbals or other nutritional supplements. Any of these may interact with some food or medicines and cause a reaction      Pre-Surgery Instructions:   Medication Instructions   • amLODIPine (NORVASC) 2.5 mg tablet Take night before surgery   • aspirin (ECOTRIN LOW STRENGTH) 81 mg EC tablet Hold day of surgery. • atorvastatin (LIPITOR) 20 mg tablet Take night before surgery   • Calcium Polycarbophil (FIBER-CAPS PO) Hold day of surgery. • cholecalciferol (VITAMIN D3) 1,000 units tablet Hold day of surgery. • multivitamin (THERAGRAN) TABS Hold day of surgery. • nebivolol (BYSTOLIC) 5 mg tablet Take night before surgery   • Omega-3 Fatty Acids (Fish Oil) 1200 MG CAPS Hold day of surgery.

## 2023-08-23 ENCOUNTER — APPOINTMENT (OUTPATIENT)
Dept: RADIOLOGY | Facility: HOSPITAL | Age: 48
End: 2023-08-23
Payer: COMMERCIAL

## 2023-08-23 ENCOUNTER — ANESTHESIA (OUTPATIENT)
Dept: PERIOP | Facility: HOSPITAL | Age: 48
End: 2023-08-23
Payer: COMMERCIAL

## 2023-08-23 ENCOUNTER — ANESTHESIA EVENT (OUTPATIENT)
Dept: PERIOP | Facility: HOSPITAL | Age: 48
End: 2023-08-23
Payer: COMMERCIAL

## 2023-08-23 ENCOUNTER — HOSPITAL ENCOUNTER (OUTPATIENT)
Facility: HOSPITAL | Age: 48
Setting detail: OUTPATIENT SURGERY
Discharge: HOME | End: 2023-08-23
Attending: ORTHOPAEDIC SURGERY | Admitting: ORTHOPAEDIC SURGERY
Payer: COMMERCIAL

## 2023-08-23 VITALS
SYSTOLIC BLOOD PRESSURE: 141 MMHG | BODY MASS INDEX: 36.23 KG/M2 | RESPIRATION RATE: 20 BRPM | HEART RATE: 59 BPM | TEMPERATURE: 96.3 F | OXYGEN SATURATION: 100 % | WEIGHT: 282.19 LBS | DIASTOLIC BLOOD PRESSURE: 65 MMHG

## 2023-08-23 PROBLEM — Z98.890 PONV (POSTOPERATIVE NAUSEA AND VOMITING): Status: ACTIVE | Noted: 2023-08-23

## 2023-08-23 PROBLEM — T75.3XXA MOTION SICKNESS: Status: ACTIVE | Noted: 2023-08-23

## 2023-08-23 PROBLEM — R11.2 PONV (POSTOPERATIVE NAUSEA AND VOMITING): Status: ACTIVE | Noted: 2023-08-23

## 2023-08-23 PROCEDURE — 73140 X-RAY EXAM OF FINGER(S): CPT

## 2023-08-23 PROCEDURE — 26540 REPAIR HAND JOINT: CPT | Performed by: ORTHOPAEDIC SURGERY

## 2023-08-23 PROCEDURE — C1713 ANCHOR/SCREW BN/BN,TIS/BN: HCPCS | Performed by: ORTHOPAEDIC SURGERY

## 2023-08-23 PROCEDURE — 26540 REPAIR HAND JOINT: CPT | Performed by: PHYSICIAN ASSISTANT

## 2023-08-23 DEVICE — DX SWIVELOCK SL, 3.5X8.5MM W/FORK EYELET
Type: IMPLANTABLE DEVICE | Site: THUMB | Status: FUNCTIONAL
Brand: ARTHREX®

## 2023-08-23 RX ORDER — SODIUM CHLORIDE, SODIUM LACTATE, POTASSIUM CHLORIDE, CALCIUM CHLORIDE 600; 310; 30; 20 MG/100ML; MG/100ML; MG/100ML; MG/100ML
125 INJECTION, SOLUTION INTRAVENOUS CONTINUOUS
Status: DISCONTINUED | OUTPATIENT
Start: 2023-08-23 | End: 2023-08-23 | Stop reason: HOSPADM

## 2023-08-23 RX ORDER — CEFAZOLIN SODIUM 2 G/50ML
SOLUTION INTRAVENOUS AS NEEDED
Status: DISCONTINUED | OUTPATIENT
Start: 2023-08-23 | End: 2023-08-23

## 2023-08-23 RX ORDER — FENTANYL CITRATE 50 UG/ML
INJECTION, SOLUTION INTRAMUSCULAR; INTRAVENOUS AS NEEDED
Status: DISCONTINUED | OUTPATIENT
Start: 2023-08-23 | End: 2023-08-23

## 2023-08-23 RX ORDER — PROPOFOL 10 MG/ML
INJECTION, EMULSION INTRAVENOUS AS NEEDED
Status: DISCONTINUED | OUTPATIENT
Start: 2023-08-23 | End: 2023-08-23

## 2023-08-23 RX ORDER — FENTANYL CITRATE/PF 50 MCG/ML
25 SYRINGE (ML) INJECTION
Status: DISCONTINUED | OUTPATIENT
Start: 2023-08-23 | End: 2023-08-23 | Stop reason: HOSPADM

## 2023-08-23 RX ORDER — KETOROLAC TROMETHAMINE 30 MG/ML
INJECTION, SOLUTION INTRAMUSCULAR; INTRAVENOUS AS NEEDED
Status: DISCONTINUED | OUTPATIENT
Start: 2023-08-23 | End: 2023-08-23

## 2023-08-23 RX ORDER — CHLORHEXIDINE GLUCONATE 0.12 MG/ML
15 RINSE ORAL ONCE
Status: COMPLETED | OUTPATIENT
Start: 2023-08-23 | End: 2023-08-23

## 2023-08-23 RX ORDER — EPHEDRINE SULFATE 50 MG/ML
INJECTION INTRAVENOUS AS NEEDED
Status: DISCONTINUED | OUTPATIENT
Start: 2023-08-23 | End: 2023-08-23

## 2023-08-23 RX ORDER — LIDOCAINE HYDROCHLORIDE 10 MG/ML
INJECTION, SOLUTION EPIDURAL; INFILTRATION; INTRACAUDAL; PERINEURAL AS NEEDED
Status: DISCONTINUED | OUTPATIENT
Start: 2023-08-23 | End: 2023-08-23

## 2023-08-23 RX ORDER — ONDANSETRON 2 MG/ML
INJECTION INTRAMUSCULAR; INTRAVENOUS AS NEEDED
Status: DISCONTINUED | OUTPATIENT
Start: 2023-08-23 | End: 2023-08-23

## 2023-08-23 RX ORDER — DEXAMETHASONE SODIUM PHOSPHATE 10 MG/ML
INJECTION, SOLUTION INTRAMUSCULAR; INTRAVENOUS AS NEEDED
Status: DISCONTINUED | OUTPATIENT
Start: 2023-08-23 | End: 2023-08-23

## 2023-08-23 RX ORDER — MIDAZOLAM HYDROCHLORIDE 2 MG/2ML
INJECTION, SOLUTION INTRAMUSCULAR; INTRAVENOUS AS NEEDED
Status: DISCONTINUED | OUTPATIENT
Start: 2023-08-23 | End: 2023-08-23

## 2023-08-23 RX ORDER — MAGNESIUM HYDROXIDE 1200 MG/15ML
LIQUID ORAL AS NEEDED
Status: DISCONTINUED | OUTPATIENT
Start: 2023-08-23 | End: 2023-08-23 | Stop reason: HOSPADM

## 2023-08-23 RX ORDER — ATROPINE SULFATE 1 MG/ML
INJECTION, SOLUTION INTRAVENOUS AS NEEDED
Status: DISCONTINUED | OUTPATIENT
Start: 2023-08-23 | End: 2023-08-23

## 2023-08-23 RX ORDER — SCOLOPAMINE TRANSDERMAL SYSTEM 1 MG/1
1 PATCH, EXTENDED RELEASE TRANSDERMAL
Status: DISCONTINUED | OUTPATIENT
Start: 2023-08-23 | End: 2023-08-23 | Stop reason: HOSPADM

## 2023-08-23 RX ADMIN — MIDAZOLAM 2 MG: 1 INJECTION INTRAMUSCULAR; INTRAVENOUS at 07:27

## 2023-08-23 RX ADMIN — LIDOCAINE HYDROCHLORIDE 50 MG: 10 INJECTION, SOLUTION EPIDURAL; INFILTRATION; INTRACAUDAL; PERINEURAL at 07:37

## 2023-08-23 RX ADMIN — ATROPINE SULFATE 0.4 MCG: 1 INJECTION, SOLUTION INTRAVENOUS at 07:40

## 2023-08-23 RX ADMIN — EPHEDRINE SULFATE 5 MG: 50 INJECTION, SOLUTION INTRAVENOUS at 08:10

## 2023-08-23 RX ADMIN — CHLORHEXIDINE GLUCONATE 15 ML: 1.2 SOLUTION ORAL at 06:50

## 2023-08-23 RX ADMIN — SODIUM CHLORIDE, SODIUM LACTATE, POTASSIUM CHLORIDE, AND CALCIUM CHLORIDE 125 ML/HR: .6; .31; .03; .02 INJECTION, SOLUTION INTRAVENOUS at 07:14

## 2023-08-23 RX ADMIN — PROPOFOL 50 MG: 10 INJECTION, EMULSION INTRAVENOUS at 07:38

## 2023-08-23 RX ADMIN — ONDANSETRON 4 MG: 2 INJECTION INTRAMUSCULAR; INTRAVENOUS at 08:26

## 2023-08-23 RX ADMIN — DEXAMETHASONE SODIUM PHOSPHATE 10 MG: 10 INJECTION, SOLUTION INTRAMUSCULAR; INTRAVENOUS at 08:26

## 2023-08-23 RX ADMIN — KETOROLAC TROMETHAMINE 30 MG: 30 INJECTION, SOLUTION INTRAMUSCULAR at 08:26

## 2023-08-23 RX ADMIN — EPHEDRINE SULFATE 5 MG: 50 INJECTION, SOLUTION INTRAVENOUS at 08:15

## 2023-08-23 RX ADMIN — PROPOFOL 200 MG: 10 INJECTION, EMULSION INTRAVENOUS at 07:37

## 2023-08-23 RX ADMIN — FENTANYL CITRATE 50 MCG: 50 INJECTION, SOLUTION INTRAMUSCULAR; INTRAVENOUS at 07:45

## 2023-08-23 RX ADMIN — FENTANYL CITRATE 50 MCG: 50 INJECTION, SOLUTION INTRAMUSCULAR; INTRAVENOUS at 07:37

## 2023-08-23 RX ADMIN — SCOPALAMINE 1 PATCH: 1 PATCH, EXTENDED RELEASE TRANSDERMAL at 07:21

## 2023-08-23 RX ADMIN — CEFAZOLIN SODIUM 2000 MG: 2 SOLUTION INTRAVENOUS at 07:41

## 2023-08-23 NOTE — PERIOPERATIVE NURSING NOTE
Patient received into room 3 via stretcher from [facility]. Patient awake and alert, denies pain or discomfort. Left thumb/hand dressing ace bandage clean, dry and intact. Fingers warm to touch brisk capillary refill, sensation felt and patient able to move fingers not thumb.

## 2023-08-23 NOTE — OP NOTE
OPERATIVE REPORT  PATIENT NAME: Joe Sawyer    :  1975  MRN: [id number]  Pt Location: [facility] OR ROOM 01    SURGERY DATE: 2023    Surgeon(s) and Role:     * Jaswinder Adamson MD - Primary     * Donta Mcmillan PA-C - Assisting    Preop Diagnosis:  Stener lesion of left thumb, initial encounter [S53.32XA]    Post-Op Diagnosis Codes:     * Stener lesion of left thumb, initial encounter [S53.32XA]    Procedure(s):  Left - RECONSTRUCTION UCL THUMB    Specimen(s):  * No specimens in log *    Estimated Blood Loss:   Minimal    Drains:  * No LDAs found *    Anesthesia Type:   General    Operative Indications:  Stener lesion of left thumb, initial encounter [S53.32XA]      Operative Findings:  Avulsion of UCL from base of proximal phalanx  Stener lesion not present. Complications:   None    Implant:  cicayda SwivelLock SL, with Froker Eyelet, 3.5 x 8.5mm    Procedure and Technique:  The patient was correctly identified in the preanesthesia holding area. The operative site was identified and marked with a marker. We reviewed the informed consent which included the planned surgical procedure, risk, benefits, alternatives, as well as, postoperative care expectations. I discussed two options for repair which included simple repair using a suture anchor or repair with augmentation of fibertape (Internal Brace). If tissue was robust, then simple repair would be done. If tissue not robust, then augmentation would be done. Discussed potential issue with stiffness of the MCP joint with augmentation. Questions were answered to the patient satisfaction. The patient stated an understanding of the plan and was agreeable to moving forward with the plan as discussed. He had voluntarily signed informed consent. The patient was taken back to the operating room. The patient was positioned in a supine position on the operating table . I applied tourniquet to the operative extremity.  The operative extremity was prepped and draped in a sterile fashion. A timeout was performed. I used a marker to plan the curvilinear surgical incision along the ulnar border of the thumb MCP joint. Next, I elevated the upper extremity, applied an Esmarch, and inflated the tourniquet to 250 mmHg. I used a #15 blade to make the skin incision. I elevated full thickness skin flaps. I identified the adductor aponeurosis and incised the aponeurosis with a #15 blade. The aponeurosis was elevated. The complete tear of the UCL from the base of the proximal phalanx was identified. Next the base of the proximal phalanx was prepared for insertion of the k-wire. The k-wire was inserted into the volar ulnar base of the proximal phalanx. Its position evaluated fluoroscopically and found to be satisfactory. Next, the drill was cannulated over the k-wire and advanced to the stop lock. Next, fiberwire suture was inserted into the UCL in a horizontal mattress. The suture tails were engaged into the fork of the suture anchor and the suture anchor was inserted into the drill hole at volar ulnar base of the proximal phalanx. The suture anchor was tested for pull out strength and found to be stable. The suture tails were trimmed. Next, I repaired the joint capsule using fiber wire. I approximated the aponeurosis using 2-0 vicryl. I evaluated the stability of the repair fluoroscopic. Stress testing was performed at full extension and at 30 degrees of flexion. The repair was stable with no subluxation of the MCP joint. I irrigated the wound with normal saline delivered the syringe. Local using 1% lidocaine and 0.5% marcaine was injected for post-operative pain management. The tourniquet  was deflated at 33 minutes. Hemostasis was achieved using electrocautery and direct pressure. I approximated the skin using 4-0 nylon in horizontal mattress pattern. A sterile dressing and a thumb spica splint were applied to the operative extremity.   The patient tolerated the procedure well and was taken to the postanesthesia care unit in stable condition. Glo Olmstead performed the entire procedure. A qualified resident physician was not available. and A physician assistant was required during the procedure for retraction, tissue handling, dissection and suturing. Postoperative plan:   • The patient was instructed on activity limitations, elevation, and use of ice for pain management. • Pain management will include the use of over-the-counter Tylenol and ibuprofen. The patient was given instructions on appropriate dosing and timing for postoperative use of Tylenol and ibuprofen. The patient will follow-up in 7 to 10 days for removal of the splint, sutures, and to receive a thumb-o-prene splint versus cast, and a prescription for therapy for scar massage and ROM of the thumb IP and CMC joints. MCP joint motion will begin at 6 weeks post-op.       Patient Disposition:  PACU         SIGNATURE: Chayo Neal MD  DATE: August 23, 2023  TIME: 8:26 AM

## 2023-08-23 NOTE — INTERVAL H&P NOTE
H&P reviewed. After examining the patient I find no changes in the patients condition since the H&P had been written. Reviewed informed consent. Discussed with patient the two techniques that can be used- suture anchor versus suture anchor versus internal brace which he inquired about. If UCL is robust for repair with suture anchor only, then I will do suture anchor only. If UCL tissue quality is less than optimal, the internal brace will provide a robust repair. Patient expressed understanding of the plan and was agreeable to moving forward with either technique depending on findings in the OR.     Vitals:    08/23/23 0644   BP: 152/89   Pulse: (!) 45   Resp: 18   Temp: (!) 96.8 °F (36 °C)   SpO2: 100%

## 2023-08-23 NOTE — DISCHARGE INSTR - AVS FIRST PAGE
Post Operative Instructions    You have had surgery on your arm today, please read and follow the information below:  Elevate your hand above your elbow during the next 24-48 hours to help with swelling. Place your hand and arm over your head with motion at your shoulder three times a day. Do not apply any cream/ointment/oil to your incisions including antibiotics. Do not soak your hands in standing water (dishwater, tubs, Jacuzzi's, pools, etc.) until given permission     Call the office if you notice any:  Increased numbness or tingling of your hand or fingers that is not relieved with elevation. Increasing pain that is not controlled with medication. Difficulty chewing, breathing, swallowing. Chest pains or shortness of breath. Fever over 101.4 degrees. Bandage: Do NOT remove bandage until follow-up appointment. Weight bearing status: Avoid heavy lifting any lifting with the extremity that was operated on until follow up appointment. Ice: Ice  the back of the hand for 10 minutes every hour as needed for swelling x 24 hours. Sling: No sling necessary. Medications:   Tylenol and Ibuprofen- You may take over-the-counter Tylenol and Ibuprofen. Tylenol dosing can be either Extra Strength (500 mg) or 2 tablets of Regular Strength (650 mg). Ibuprofen dosing can be up to 3 tablets of 200 mg for a total of 600mg. Timing is as follows: Take Tylenol, then 3 hours later take ibuprofen, then 3 hours later take Tylenol, then 3 hours later take ibuprofen. Every 3 hours, you will take either Tylenol or ibuprofen. Antibiotics will be prescribed only if indicated. Follow-up Appointment: 7-10 days.

## 2023-08-23 NOTE — ANESTHESIA POSTPROCEDURE EVALUATION
Post-Op Assessment Note    CV Status:  Stable    Pain management: adequate     Mental Status:  Sleepy and arousable   Hydration Status:  Stable and euvolemic   PONV Controlled:  Controlled   Airway Patency:  Patent and adequate      Post Op Vitals Reviewed: Yes      Staff: CRNA         No notable events documented.     /67 (08/23/23 0840)    Temp (!) 97.1 °F (36.2 °C) (08/23/23 0840)    Pulse 60 (08/23/23 0840)   Resp 16 (08/23/23 0840)    SpO2 100 % (08/23/23 0840)

## 2023-08-23 NOTE — ANESTHESIA PREPROCEDURE EVALUATION
Procedure:  RECONSTRUCTION UCL THUMB (Left: Finger)    Relevant Problems   ANESTHESIA   (+) Motion sickness   (+) PONV (postoperative nausea and vomiting)      CARDIO   (+) Benign essential hypertension   (+) Mixed hyperlipidemia   (+) PAF (paroxysmal atrial fibrillation) (HCC)      GI/HEPATIC   (+) Fatty liver disease, nonalcoholic      Other   (+) Obesity        Physical Exam    Airway    Mallampati score: II  TM Distance: >3 FB  Neck ROM: full     Dental   Comment: Denies loose teeth,     Cardiovascular  Cardiovascular exam normal    Pulmonary  Pulmonary exam normal     Other Findings  Portions of exam deferred due to low yield and/or unknown COVID status      Anesthesia Plan  ASA Score- 3     Anesthesia Type- general with ASA Monitors. Additional Monitors:   Airway Plan: LMA. Plan Factors-Exercise tolerance (METS): >4 METS. Chart reviewed. Existing labs reviewed. Patient summary reviewed. Patient is not a current smoker. Induction- intravenous. Postoperative Plan-     Informed Consent- Anesthetic plan and risks discussed with patient. I personally reviewed this patient with the CRNA. Discussed and agreed on the Anesthesia Plan with the CRNA. Vick Lopez

## 2023-08-24 ENCOUNTER — TELEPHONE (OUTPATIENT)
Age: 48
End: 2023-08-24

## 2023-08-24 NOTE — TELEPHONE ENCOUNTER
Caller: Nurse     Doctor: Shira Couch    Reason for call: Nurse received a call from patient stating he would like to discuss the surgery he had yesterday     Call back#: [phone]

## 2023-09-05 ENCOUNTER — OFFICE VISIT (OUTPATIENT)
Dept: OBGYN CLINIC | Facility: CLINIC | Age: 48
End: 2023-09-05

## 2023-09-05 VITALS
BODY MASS INDEX: 36.19 KG/M2 | WEIGHT: 282 LBS | HEART RATE: 53 BPM | SYSTOLIC BLOOD PRESSURE: 146 MMHG | DIASTOLIC BLOOD PRESSURE: 84 MMHG | HEIGHT: 74 IN

## 2023-09-05 DIAGNOSIS — S63.642D RUPTURE OF ULNAR COLLATERAL LIGAMENT OF LEFT THUMB, SUBSEQUENT ENCOUNTER: Primary | ICD-10-CM

## 2023-09-05 DIAGNOSIS — S53.32XD: ICD-10-CM

## 2023-09-05 PROCEDURE — 99024 POSTOP FOLLOW-UP VISIT: CPT | Performed by: ORTHOPAEDIC SURGERY

## 2023-09-05 NOTE — PROGRESS NOTES
Assessment/Plan:  1. Rupture of ulnar collateral ligament of left thumb, subsequent encounter        2. Stener lesion of left thumb, subsequent encounter          Scribe Attestation    I,:  Sagar Mendes am acting as a scribe while in the presence of the attending physician.:       I,:  Teja Vital MD personally performed the services described in this documentation    as scribed in my presence.:         · Surgery performed on 8/23/23. · Post-op splint removed and hand inspected today  · Surgical incision healing well. Sutures removed with aseptic technique today. · Leah Meyers understands the need for immobilization. Discussed casting vs bracing. He was placed into temporary splint today until using thumboprene at home. (He did not bring the thumboprene to the office visit today). Kennyth Cipro understood that if at any point in time he would prefer the use of a cast, he may call the office. · Avoid gripping or use of hand  · Follow up in 4 weeks. Initiate hand therapy at that time  · All questions and concerns addressed today    Subjective: Follow-up evaluation 2 weeks status post left thumb UCL reconstruction    Patient ID: Milagros Louise is a 52 y.o. male who returns today for follow-up evaluation 2 weeks status post left thumb UCL reconstruction. He reports he has been doing well. He has been immobilized in his postoperative splint. He does report some "twinges" of discomfort and dysesthesia about the hand, but this quickly resolves. He denies any new injury or trauma. Review of Systems   Constitutional: Positive for activity change. Negative for chills, fever and unexpected weight change. HENT: Negative for hearing loss, nosebleeds and sore throat. Eyes: Negative for pain, redness and visual disturbance. Respiratory: Negative for cough, shortness of breath and wheezing. Cardiovascular: Negative for chest pain, palpitations and leg swelling.    Gastrointestinal: Negative for abdominal pain, nausea and vomiting. Endocrine: Negative for polyphagia and polyuria. Genitourinary: Negative for dysuria and hematuria. Musculoskeletal: Positive for arthralgias and joint swelling. Negative for myalgias. See HPI   Skin: Negative for rash and wound. Neurological: Negative for dizziness, numbness and headaches. Psychiatric/Behavioral: Negative for decreased concentration and suicidal ideas. The patient is not nervous/anxious.           Past Medical History:   Diagnosis Date   • Atrial fibrillation [facility])     summer 0f 2022 x1(dehydration caused a fib) episode-previously 2019 had afib   • Finger deformity, left 02/16/2016    Resolved:  September 22, 2017   • Fracture of finger, left, closed 05/26/2016    Resolved:  September 22, 2017   • Fracture of proximal phalanx of lesser toe of left foot 08/29/2016    Initial encounter:  Resolved:  September 22, 2017   • H/O vitamin D deficiency    • Hyperlipidemia    • Hypertension    • Neoplasm of uncertain behavior of skin 08/02/2012    Resolved:  February 16, 2016   • Obesity 02/16/2016    Resolved:  January 16, 2018   • Overweight 02/2014    Resolved:  February 16, 2016   • PONV (postoperative nausea and vomiting)     with fever of 101-102-treated with fluids s/p surgery-denied any other side effects with anesthesia   • Sebaceous cyst 11/15/2010       Past Surgical History:   Procedure Laterality Date   • FOOT SURGERY Left 2003   • [place] RCNSTJ COLTRL CHUCHO [place] 1 W/GRF EA JT Left 8/23/2023    Procedure: [place];  Surgeon: Raine Burdick MD;  Location: [facility]IN [facility];  Service: Orthopedics   • TONSILLECTOMY     • WISDOM TOOTH EXTRACTION     • WRIST SURGERY Left 2003    titanium plate       Family History   Problem Relation Age of Onset   • Arthritis Mother    • Hyperlipidemia Mother    • Hypertension Father    • Prostate cancer Father    • Cancer Family    • Diabetes Family    • Heart disease Family    • Other Family hyperlipoprotein       Social History     Occupational History   • Not on file   Tobacco Use   • Smoking status: Never     Passive exposure: Past   • Smokeless tobacco: Never   Vaping Use   • Vaping Use: Never used   Substance and Sexual Activity   • Alcohol use: Not Currently     Comment: No lcoholic beverages since January 2023   • Drug use: Never   • Sexual activity: Yes     Partners: Female     Birth control/protection: None         Current Outpatient Medications:   •  amLODIPine (NORVASC) 2.5 mg tablet, Take 1 tablet (2.5 mg total) by mouth daily at bedtime, Disp: 30 tablet, Rfl: 1  •  aspirin (ECOTRIN LOW STRENGTH) 81 mg EC tablet, Take 1 tablet (81 mg total) by mouth daily, Disp: 30 tablet, Rfl: 0  •  atorvastatin (LIPITOR) 20 mg tablet, Take 1 tablet (20 mg total) by mouth daily at bedtime, Disp: 30 tablet, Rfl: 1  •  Calcium Polycarbophil (FIBER-CAPS PO), 2 (two) times a day PGX, Disp: , Rfl:   •  cholecalciferol (VITAMIN D3) 1,000 units tablet, Take 1,000 Units by mouth 2 (two) times a week, Disp: , Rfl:   •  multivitamin (THERAGRAN) TABS, Take 1 tablet by mouth daily, Disp: , Rfl:   •  Omega-3 Fatty Acids (Fish Oil) 1200 MG CAPS, Take by mouth in the morning, Disp: , Rfl:   •  nebivolol (BYSTOLIC) 5 mg tablet, Take 1 tablet (5 mg total) by mouth daily (Patient not taking: Reported on 9/5/2023), Disp: 90 tablet, Rfl: 3    No Known Allergies    Objective:  Vitals:    09/05/23 1108   BP: 146/84   Pulse: (!) 53       Body mass index is 36.21 kg/m². Left Hand Exam     Other   Erythema: absent  Scars: present  Sensation: normal  Pulse: present    Comments:  Surgical incision healing well. Sutures removed today aseptically. Physical Exam  Vitals and nursing note reviewed. Constitutional:       Appearance: Normal appearance. He is well-developed. HENT:      Head: Normocephalic and atraumatic.       Right Ear: External ear normal.      Left Ear: External ear normal.      Nose: Nose normal. Eyes:      General: No scleral icterus. Extraocular Movements: Extraocular movements intact. Conjunctiva/sclera: Conjunctivae normal.   Cardiovascular:      Rate and Rhythm: Normal rate. Pulmonary:      Effort: Pulmonary effort is normal. No respiratory distress. Musculoskeletal:      Cervical back: Normal range of motion and neck supple. Comments: See Ortho exam   Skin:     General: Skin is warm and dry. Neurological:      General: No focal deficit present. Mental Status: He is alert and oriented to person, place, and time. Psychiatric:         Behavior: Behavior normal.         This document was created using speech voice recognition software. Grammatical errors, random word insertions, pronoun errors, and incomplete sentences are an occasional consequence of this system due to software limitations, ambient noise, and hardware issues. Any formal questions or concerns about content, text, or information contained within the body of this dictation should be directly addressed to the provider for clarification.

## 2023-09-21 DIAGNOSIS — I10 BENIGN ESSENTIAL HYPERTENSION: ICD-10-CM

## 2023-09-21 RX ORDER — AMLODIPINE BESYLATE 2.5 MG/1
2.5 TABLET ORAL
Qty: 30 TABLET | Refills: 1 | Status: SHIPPED | OUTPATIENT
Start: 2023-09-21

## 2023-09-21 NOTE — TELEPHONE ENCOUNTER
Reason for call:   [x] Refill   [] Prior Auth  [] Other:     Office:   [x] PCP/Provider - Valentino Lowenstein  [] Speciality/Provider -     Medication: Amlodipine   Dose/Frequency: 2.5mg Daily    Quantity: 90    Pharmacy: [address] [place] [address]    Does the patient have enough for 3 days? [x] Yes -exactly 3 days  [] No - Send as HP to POD    Last script was written by emergency room dr, not patients primary, he was prescribed this back in January but it was made inactive due to new script. Please forward to Dr Valentino Lowenstein not previous writer.

## 2023-10-03 ENCOUNTER — APPOINTMENT (OUTPATIENT)
Dept: RADIOLOGY | Facility: CLINIC | Age: 48
End: 2023-10-03
Payer: COMMERCIAL

## 2023-10-03 ENCOUNTER — OFFICE VISIT (OUTPATIENT)
Dept: OBGYN CLINIC | Facility: CLINIC | Age: 48
End: 2023-10-03

## 2023-10-03 VITALS
DIASTOLIC BLOOD PRESSURE: 87 MMHG | WEIGHT: 282 LBS | HEIGHT: 74 IN | BODY MASS INDEX: 36.19 KG/M2 | HEART RATE: 49 BPM | SYSTOLIC BLOOD PRESSURE: 138 MMHG

## 2023-10-03 DIAGNOSIS — S63.642D RUPTURE OF ULNAR COLLATERAL LIGAMENT OF LEFT THUMB, SUBSEQUENT ENCOUNTER: Primary | ICD-10-CM

## 2023-10-03 DIAGNOSIS — S63.642D RUPTURE OF ULNAR COLLATERAL LIGAMENT OF LEFT THUMB, SUBSEQUENT ENCOUNTER: ICD-10-CM

## 2023-10-03 PROBLEM — S63.642A RUPTURE OF ULNAR COLLATERAL LIGAMENT OF LEFT THUMB: Status: ACTIVE | Noted: 2023-10-03

## 2023-10-03 PROCEDURE — 73140 X-RAY EXAM OF FINGER(S): CPT

## 2023-10-03 PROCEDURE — 99024 POSTOP FOLLOW-UP VISIT: CPT | Performed by: ORTHOPAEDIC SURGERY

## 2023-10-03 NOTE — PROGRESS NOTES
Assessment:   S/P Reconstruction Ucl Thumb - Left on 8/23/2023    Plan:   · Begin occupational hand therapy  · Scar massage reviewed with the patient today  · Thumb-o-prene splint provided to the patient today    Follow Up:  4  week(s) with Dr. Era Arauz    To Do Next Visit:  Splint off      CHIEF COMPLAINT:  Chief Complaint   Patient presents with   • Left Thumb - Post-op         SUBJECTIVE:  Manny Arora is a 52 y.o. male who presents for follow up after repair of UCL Thumb - Left on 8/23/2023. Today patient has soreness and stiffness of the thumb when out of the splint for hygiene purposes. PHYSICAL EXAMINATION:  Vital signs: /87   Pulse (!) 49   Ht 6' 2" (1.88 m)   Wt 128 kg (282 lb)   BMI 36.21 kg/m²   General: well developed and well nourished, alert, oriented times 3 and appears comfortable  Psychiatric: Normal    MUSCULOSKELETAL EXAMINATION:  Incision: healed +scar sensitivity  Range of Motion: full composite fist possible Thumb ROM limited at MCP joint  Neurovascular status: Neuro intact, good cap refill  Activity Restrictions: Cast/splint restrictions    STUDIES REVIEWED:  I have personally reviewed pertinent films in PACS and my interpretation is appropriate alignment of the thumb.     PROCEDURES PERFORMED:  No Procedures performed today     Scribe Attestation    I,:  Stefany Monsivais am acting as a scribe while in the presence of the attending physician.:       I,:  Pura Castaneda MD personally performed the services described in this documentation    as scribed in my presence.:

## 2023-10-17 ENCOUNTER — TELEPHONE (OUTPATIENT)
Age: 48
End: 2023-10-17

## 2023-10-17 NOTE — TELEPHONE ENCOUNTER
Caller: Patient     Doctor: Manpreet Lopez     Reason for call: Asked for PT number, number was given     Call back#: [phone]

## 2023-10-23 ENCOUNTER — EVALUATION (OUTPATIENT)
Dept: OCCUPATIONAL THERAPY | Facility: CLINIC | Age: 48
End: 2023-10-23
Payer: COMMERCIAL

## 2023-10-23 DIAGNOSIS — S63.642D RUPTURE OF ULNAR COLLATERAL LIGAMENT OF LEFT THUMB, SUBSEQUENT ENCOUNTER: Primary | ICD-10-CM

## 2023-10-23 PROCEDURE — 97166 OT EVAL MOD COMPLEX 45 MIN: CPT

## 2023-10-23 NOTE — PROGRESS NOTES
OT Evaluation     Today's date: 10/23/2023  Patient name: Dianna Willoughby  : 1975  MRN: [id number]  Referring provider: Jin Waddell,*  Dx:   Encounter Diagnosis     ICD-10-CM    1. Rupture of ulnar collateral ligament of left thumb, subsequent encounter  S63.642D Ambulatory Referral to PT/OT Hand Therapy          Start Time: 930  Stop Time: 1015  Total time in clinic (min): 45 minutes    Assessment  Assessment details: Patient is a 50 y.o. male RHD who presents for OT IE and treatment for s/p reconstruction UCL Thumb of the left by Dr. Chivo Patrick. DOS 23. Patient is approximately 9 weeks s/p DOS. Patient reports playing softball game on 23, where he collided with another opponent player when tagging the individual out. Patient was seen in urgent care the on 8/10/23 and had xrays which showed a small avulsion fracture about to the ulnar aspect of the MCP joint; had a further follow up evaluation with Jim Macias PA-C and was ordered an MRI that show a full tear of the ulnar collateral ligament of the left thumb metacarpophalangeal joint. Saw Dr. Chivo Patrick on 2023 regarding conservative management vs surgical management. At [address] [place] [address] op visit 10/3/2023, patient was provided a thumb - o - prene splint. Patient reports no complaints of pain, independent in ADLs/IADLs, has not participated in weightlifting or strengthening since surgery; participating in daily walks and jogging. Patient works in [employer]. Patient referred by Dr. Jin Waddell to initiate treatment including hand therapy. Patient has a follow up with Dr. Sharren Primrose on 10/31/2023. Impairments: abnormal or restricted ROM, activity intolerance, impaired physical strength, lacks appropriate home exercise program and weight-bearing intolerance  Understanding of Dx/Px/POC: good   Prognosis: good    Goals  Short Term Goals by 2 - 4 weeks:    Establish HEP to increase performance with daily activities.      Patient will increase ROM of LUE thumb by at least 10 degrees to complete ADLs. Patient will demonstrate understanding and knowledge of scar massage management for HEP to increase ROM and flexibility. When appropriate:   Patient will increase LUE  strength by at least 10 lbs to assist in completing ADLs. Patient will increase LUE pinch strength by at least 2 lbs to assist in completing ADLs. Long Term Goals by discharge:    Establish final home exercise program to enhance maximal functional level with ADLs. Achieve functional active range of motion of LUE for full return to household chores. Achieve functional strength of LUE for full return to high level ADLs. Plan  Patient would benefit from: orthotics, skilled occupational therapy, OT eval and custom splinting  Planned modality interventions: thermotherapy: hydrocollator packs, TENS, manual electrical stimulation, cryotherapy, ultrasound and unattended electrical stimulation  Planned therapy interventions: joint mobilization, kinesiology taping, manual therapy, massage, IASTM, patient education, orthotic management and training, orthotic fitting/training, strengthening, stretching, therapeutic activities, therapeutic exercise, functional ROM exercises, flexibility, fine motor coordination training, home exercise program, graded exercise and graded activity  Frequency: 1x-2x/week. Duration in weeks: 6  Plan of Care beginning date: 10/23/2023  Plan of Care expiration date: 12/4/2023  Treatment plan discussed with: patient        Subjective Evaluation    History of Present Illness  Date of surgery: 8/23/2023  Mechanism of injury: surgery and trauma  Mechanism of injury: Patient is a 50 y.o. male RHD who presents for OT IE and treatment for s/p reconstruction UCL Thumb of the left by Dr. Imelda Liang. DOS 8/23/23. Patient is approximately 9 weeks s/p DOS.  Patient reports playing softball game on 8/9/23, where he collided with another opponent player when tagging the individual out. Patient was seen in urgent care the on 8/10/23 and had xrays which showed a small avulsion fracture about to the ulnar aspect of the MCP joint; had a further follow up evaluation with Astrid Gomez PA-C and was ordered an MRI that show a full tear of the ulnar collateral ligament of the left thumb metacarpophalangeal joint. Saw Dr. Suzanne Grossman on 2023 regarding conservative management vs surgical management. At [address] [place] [address] op visit 10/3/2023, patient was provided a thumb - o - prene splint. Patient reports no complaints of pain, independent in ADLs/IADLs, has not participated in weightlifting or strengthening since surgery; participating in daily walks and jogging. Patient works in corporate security. Patient referred by Dr. Calista Schuster to initiate treatment including hand therapy. Patient has a follow up with Dr. Jena Ugarte on 10/31/2023.      Patient Goals  Patient goals for therapy: decreased edema, increased motion, increased strength, independence with ADLs/IADLs and return to sport/leisure activities    Pain  Current pain ratin  At best pain ratin  At worst pain ratin  Quality: dull ache    Social Support  Lives with: spouse    Employment status: working  Hand dominance: right        Objective     Active Range of Motion     Left Wrist   Wrist flexion: 60 degrees   Wrist extension: 80 degrees   Radial deviation: 18 degrees   Ulnar deviation: 25 degrees     Right Wrist   Wrist flexion: 66 degrees   Wrist extension: 70 degrees   Radial deviation: 19 degrees   Ulnar deviation: 30 degrees     Left Thumb   Flexion     CMC: 10 degrees    MP: 52 degrees    DIP: 70 degrees  Extension     CMC: 0 degrees    MP: 0 degrees    DIP: 0 degrees  Palmar Abduction     CMC: 45 degrees  Radial abduction    CMC: 45 degrees      Right Thumb   Flexion     CMC: 8    MP: 56    DIP: 60  Extension     CMC: 0    MP: 0    DIP: 0  Palmar Abduction CMC: 50  Radial Abduction    CMC: 58             Precautions: s/p Reconstruction UCL left thumb. DOS: 8/23/23. Splinting - thumboprene dispense. May fabricate splint as needed   Scar massage   Desensitization   Edema management   Modalities per therapist's discretion     ROM:  Elbow, forearm, wrist and fingers - advance per therapist's discretion   __x__ Active   ____ Active assist   ____ Passive   ____ Resistive/Strengthening           Visit Tracker: Dorothea Montes. COPAY $50  Date 10/23  IE                                                                                      Manuals HEP 10/23/2023   Manual/scar management  x5min                  Ther Ex     Education on Dx and HEP  x5min   Thumb ROM x10 x10                       Ther Activity                                   Modalities     MHP  x5min            Assessment:   Patient tolerated session well. Patient demonstrates minimal range of motion deficits of the left upper extremity compared to the right upper extremity. Functional strength was not assessed this date per MD/surgical protocol. Discussed with patient about finding another over the counter brace vs custom fabrication of custom splint at next therapy session due to discomfort on the thumb. Patient session focused on patient education on anatomy and physiology concerning current dx, techniques for decreasing deficits through HEP, and appropriate use of modalities. Patient educated on HEP with verbal instructions and handouts for patient reference. Patient educated on treatment plan at this time. Patient benefiting from skilled hand therapy OT to reduce deficits to improve independence with daily activities. Plan:   Focus on AROM, splinting/bracing, scar management, desensitization, edema management, all modalities as seen fit, AROM only at this time to improve ability to complete daily activites with ease.   POC: 10/23/2023 - 12/4/2023

## 2023-10-31 ENCOUNTER — OFFICE VISIT (OUTPATIENT)
Dept: OBGYN CLINIC | Facility: CLINIC | Age: 48
End: 2023-10-31
Payer: COMMERCIAL

## 2023-10-31 VITALS
WEIGHT: 282 LBS | HEART RATE: 46 BPM | BODY MASS INDEX: 36.19 KG/M2 | HEIGHT: 74 IN | SYSTOLIC BLOOD PRESSURE: 143 MMHG | DIASTOLIC BLOOD PRESSURE: 89 MMHG

## 2023-10-31 DIAGNOSIS — S63.642D RUPTURE OF ULNAR COLLATERAL LIGAMENT OF LEFT THUMB, SUBSEQUENT ENCOUNTER: Primary | ICD-10-CM

## 2023-10-31 DIAGNOSIS — Z98.890 S/P MUSCULOSKELETAL SYSTEM SURGERY: ICD-10-CM

## 2023-10-31 PROCEDURE — 3077F SYST BP >= 140 MM HG: CPT | Performed by: STUDENT IN AN ORGANIZED HEALTH CARE EDUCATION/TRAINING PROGRAM

## 2023-10-31 PROCEDURE — 99213 OFFICE O/P EST LOW 20 MIN: CPT | Performed by: STUDENT IN AN ORGANIZED HEALTH CARE EDUCATION/TRAINING PROGRAM

## 2023-10-31 PROCEDURE — 3079F DIAST BP 80-89 MM HG: CPT | Performed by: STUDENT IN AN ORGANIZED HEALTH CARE EDUCATION/TRAINING PROGRAM

## 2023-10-31 NOTE — PROGRESS NOTES
ASSESSMENT/PLAN:    Assessment:   2 months s/p left thumb UCL reconstruction DOS 8/23/23 by Dr. Ha Odom     Plan:   Tonia Holloway is doing well. He may continue the use of the soft thumb spica brace on an as needed basis. His thumb ROM is full. He has no MCP joint instability or laxity on exam. I would continue to attend OT per their plan of care. I would continue to avoid contact sports for 3 more months. I will see him back in the office as needed if symptoms worsen or fail to improve. We discussed a 2 month follow up but he declined unless he has any issues or concerns. Follow Up:  PRN    To Do Next Visit:  Re-evaluation     _____________________________________________________  CHIEF COMPLAINT:  Chief Complaint   Patient presents with   • Left Thumb - Post-op         SUBJECTIVE:  Gwen Jade is a 50 y.o. male who presents to the office approx. 2 months s/p left thumb UCL reconstruction DOS 8/23/23 performed by Dr. Ha Odom. Overall Femi Lim is doing well. He has been attending formal therapy with improvement in ROM. He denies any pain. He has been wearing a soft thumb spica brace.      PAST MEDICAL HISTORY:  Past Medical History:   Diagnosis Date   • Atrial fibrillation [facility])     summer 0f 2022 x1(dehydration caused a fib) episode-previously 2019 had afib   • Finger deformity, left 02/16/2016    Resolved:  September 22, 2017   • Fracture of finger, left, closed 05/26/2016    Resolved:  September 22, 2017   • Fracture of proximal phalanx of lesser toe of left foot 08/29/2016    Initial encounter:  Resolved:  September 22, 2017   • H/O vitamin D deficiency    • Hyperlipidemia    • Hypertension    • Neoplasm of uncertain behavior of skin 08/02/2012    Resolved:  February 16, 2016   • Obesity 02/16/2016    Resolved:  January 16, 2018   • Overweight 02/2014    Resolved:  February 16, 2016   • PONV (postoperative nausea and vomiting)     with fever of 101-102-treated with fluids s/p surgery-denied any other side effects with anesthesia   • Sebaceous cyst 11/15/2010       PAST SURGICAL HISTORY:  Past Surgical History:   Procedure Laterality Date   • FOOT SURGERY Left 2003   • OK RCNSTJ COLTRL LIGM IPHAL JT 1 W/GRF EA JT Left 8/23/2023    Procedure: RECONSTRUCTION UCL THUMB;  Surgeon: Mariah Solorio MD;  Location: [facility];  Service: Orthopedics   • TONSILLECTOMY     • WISDOM TOOTH EXTRACTION     • WRIST SURGERY Left 2003    titanium plate       FAMILY HISTORY:  Family History   Problem Relation Age of Onset   • Arthritis Mother    • Hyperlipidemia Mother    • Hypertension Father    • Prostate cancer Father    • Cancer Family    • Diabetes Family    • Heart disease Family    • Other Family         hyperlipoprotein       SOCIAL HISTORY:  Social History     Tobacco Use   • Smoking status: Never     Passive exposure: Past   • Smokeless tobacco: Never   Vaping Use   • Vaping Use: Never used   Substance Use Topics   • Alcohol use: Not Currently     Comment: No lcoholic beverages since January 2023   • Drug use: Never       MEDICATIONS:    Current Outpatient Medications:   •  amLODIPine (NORVASC) 2.5 mg tablet, Take 1 tablet (2.5 mg total) by mouth daily at bedtime, Disp: 30 tablet, Rfl: 1  •  aspirin (ECOTRIN LOW STRENGTH) 81 mg EC tablet, Take 1 tablet (81 mg total) by mouth daily, Disp: 30 tablet, Rfl: 0  •  atorvastatin (LIPITOR) 20 mg tablet, Take 1 tablet (20 mg total) by mouth daily at bedtime, Disp: 30 tablet, Rfl: 1  •  Calcium Polycarbophil (FIBER-CAPS PO), 2 (two) times a day PGX, Disp: , Rfl:   •  cholecalciferol (VITAMIN D3) 1,000 units tablet, Take 1,000 Units by mouth 2 (two) times a week, Disp: , Rfl:   •  multivitamin (THERAGRAN) TABS, Take 1 tablet by mouth daily, Disp: , Rfl:   •  nebivolol (BYSTOLIC) 5 mg tablet, Take 1 tablet (5 mg total) by mouth daily, Disp: 90 tablet, Rfl: 3  •  Omega-3 Fatty Acids (Fish Oil) 1200 MG CAPS, Take by mouth in the morning, Disp: , Rfl:     ALLERGIES:  No Known Allergies    REVIEW OF SYSTEMS:  Pertinent items are noted in HPI. A comprehensive review of systems was negative.     LABS:  HgA1c:   Lab Results   Component Value Date    HGBA1C 5.3 04/05/2023     BMP:   Lab Results   Component Value Date    GLUCOSE 94 01/10/2018    CALCIUM 7.7 (L) 04/25/2023     01/10/2018    K 3.2 (L) 04/25/2023    CO2 22 04/25/2023     (H) 04/25/2023    BUN 16 04/25/2023    CREATININE 0.73 04/25/2023           _____________________________________________________  PHYSICAL EXAMINATION:  Vital signs: /89   Pulse (!) 46   Ht 6' 2" (1.88 m)   Wt 128 kg (282 lb)   BMI 36.21 kg/m²   General: well developed and well nourished, alert, oriented times 3, and appears comfortable  Psychiatric: Normal  HEENT: Trachea Midline, No torticollis  Cardiovascular: No discernable arrhythmia  Pulmonary: No wheezing or stridor  Abdomen: No rebound or guarding  Extremities: No peripheral edema  Skin: No masses, erythema, lacerations, fluctation, ulcerations  Neurovascular: Sensation Intact to the Median, Ulnar, Radial Nerve, Motor Intact to the Median, Ulnar, Radial Nerve, and Pulses Intact    MUSCULOSKELETAL EXAMINATION:    Left upper extremity:     No erythema, ecchymosis or edema  Thumb MCP joint is stable in flexion and extension to stress, comparable to contralateral side  Well healed surgical incision   Full ROM   Opposition intact     _____________________________________________________  STUDIES REVIEWED:  No Studies to review      PROCEDURES PERFORMED:  Procedures  No Procedures performed today      Scribe Attestation    I,:  Brandy Maiden Caleb am acting as a scribe while in the presence of the attending physician.:       I,:  Kash Mendez MD personally performed the services described in this documentation    as scribed in my presence.:

## 2023-11-01 NOTE — PROGRESS NOTES
Daily Note     Today's date: 11/3/2023  Patient name: Brissa Rios  : 1975  MRN: [id number]  Referring provider: Mal Rivera,*  Dx:   Encounter Diagnosis     ICD-10-CM    1. Rupture of ulnar collateral ligament of left thumb, subsequent encounter  K16.969X                      Subjective: Patient reports to have had follow up with referring physician. Patient reports physician to be pleased with current progress and has no instability of joint. Patient reports that he feels confident in the stability of his thumb and has strength to perform all required daily tasks. Patient states he feels he can move forward with strengthening at home with HEP. Objective: See treatment diary below         Goals  Short Term Goals by 2 - 4 weeks:    Establish HEP to increase performance with daily activities. MET    Patient will increase ROM of LUE thumb by at least 10 degrees to complete ADLs. MET    Patient will demonstrate understanding and knowledge of scar massage management for HEP to increase ROM and flexibility. MET        When appropriate:   Patient will increase LUE  strength by at least 10 lbs to assist in completing ADLs    Patient will increase LUE pinch strength by at least 2 lbs to assist in completing ADLs          Long Term Goals by discharge:    Establish final home exercise program to enhance maximal functional level with ADLs. MET    Achieve functional active range of motion of LUE for full return to household chores. MET                            Achieve functional strength of LUE for full return to high level ADLs. MET      Plan  Discharge to [facility].        Subjective Evaluation    Pain  Current pain ratin  At best pain ratin  At worst pain ratin  Quality: dull ache      Objective     Active Range of Motion     Left Wrist   Wrist flexion: 75 degrees   Wrist extension: 80 degrees   Radial deviation: 15 degrees   Ulnar deviation: 30 degrees     Right Wrist   Wrist flexion: 66 degrees   Wrist extension: 70 degrees   Radial deviation: 19 degrees   Ulnar deviation: 30 degrees     Left Thumb   Flexion     CMC: 10 degrees    MP: 63 degrees    DIP: 70 degrees  Extension     CMC: 0 degrees    MP: 0 degrees    DIP: 0 degrees  Palmar Abduction     CMC: 55 degrees  Radial abduction    CMC: 55 degrees      Right Thumb   Flexion     CMC: 8    MP: 55    DIP: 60  Extension     CMC: 0    MP: 0    DIP: 0  Palmar Abduction    CMC: 50  Radial Abduction    CMC: 58    Strengthen         - Right 125   -Left    100  Pinch    -Right     -three point: 22    -tip: 22    -key: 24   -Left    -three point: 22    -tip: 18    -key: 22    Visit Tracker: Kandice Agarwal. COPAY $50  Date 10/23  IE 11/3  D/C                                                                                     Manuals HEP 11/3/2023       x        Ther Ex     Re-evaluation   x15min   Education on Dx and HEP  x5min   HEP review  x15min             Ther Activity               Modalities     MHP  x5min            Assessment:   Discharged performed at today's session. Patient currently being discharged due to patients reports of being able to perform all daily activities with out difficulty. Formal measurements taken prior to discharge with patient demonstrating improvement in all areas. Patient demonstrates improved range of motion with values identical to non injured extremity. Strength measures taken with patient demonstrating values within functional limits. Patient provided with home exercises program consisting of strengthening activities and scar mobilization. Patient educated on anatomy of injury and positions/ activities that will place significant strain on injury site. Patient advised to modify activity and avoid contact/strenuous activity as ligament repair has not reached full strength. Patient demonstrated understanding of current healing stage and limiting strain on injury.  Patient demonstrated full understanding of HEP exercises provided. Plan:   Discharge to [facility] consisting of range of motion, strengthening, and scar massage. Patient to return should any issues arise.    POC: 10/23/2023 - 12/4/2023

## 2023-11-03 ENCOUNTER — OFFICE VISIT (OUTPATIENT)
Dept: OCCUPATIONAL THERAPY | Facility: CLINIC | Age: 48
End: 2023-11-03
Payer: COMMERCIAL

## 2023-11-03 DIAGNOSIS — S63.642D RUPTURE OF ULNAR COLLATERAL LIGAMENT OF LEFT THUMB, SUBSEQUENT ENCOUNTER: Primary | ICD-10-CM

## 2023-11-03 PROCEDURE — 97110 THERAPEUTIC EXERCISES: CPT

## 2023-11-10 ENCOUNTER — APPOINTMENT (OUTPATIENT)
Dept: OCCUPATIONAL THERAPY | Facility: CLINIC | Age: 48
End: 2023-11-10
Payer: COMMERCIAL

## 2023-11-17 ENCOUNTER — APPOINTMENT (OUTPATIENT)
Dept: OCCUPATIONAL THERAPY | Facility: CLINIC | Age: 48
End: 2023-11-17
Payer: COMMERCIAL

## 2023-11-24 ENCOUNTER — APPOINTMENT (OUTPATIENT)
Dept: OCCUPATIONAL THERAPY | Facility: CLINIC | Age: 48
End: 2023-11-24
Payer: COMMERCIAL

## 2023-11-30 DIAGNOSIS — E78.2 MIXED HYPERLIPIDEMIA: ICD-10-CM

## 2023-12-01 RX ORDER — ATORVASTATIN CALCIUM 20 MG/1
20 TABLET, FILM COATED ORAL
Qty: 30 TABLET | Refills: 0 | Status: SHIPPED | OUTPATIENT
Start: 2023-12-01

## 2023-12-26 DIAGNOSIS — E78.2 MIXED HYPERLIPIDEMIA: ICD-10-CM

## 2023-12-27 RX ORDER — ATORVASTATIN CALCIUM 20 MG/1
20 TABLET, FILM COATED ORAL
Qty: 90 TABLET | Refills: 1 | Status: SHIPPED | OUTPATIENT
Start: 2023-12-27

## 2024-01-31 ENCOUNTER — TELEPHONE (OUTPATIENT)
Dept: FAMILY MEDICINE CLINIC | Facility: CLINIC | Age: 49
End: 2024-01-31

## 2024-01-31 ENCOUNTER — PATIENT MESSAGE (OUTPATIENT)
Dept: FAMILY MEDICINE CLINIC | Facility: CLINIC | Age: 49
End: 2024-01-31

## 2024-01-31 NOTE — TELEPHONE ENCOUNTER
Regarding: Prescription Question  Contact: [phone]  ----- Message from Aleksandra Lanier sent at 1/31/2024  8:39 AM EST -----       ----- Message sent from Aleksandra Lanier to Sidney Jean at 1/31/2024  8:39 AM -----   Good morning Sidney,   We would recommend an appointment to discuss your weight loss concerns . Please call the office and the scheduling team can assist you with setting up a time that is convenient for you.    Thank you in advance ,    [facility] Clinical Team       ----- Message -----       From:Sidney Jean       Sent:1/31/2024  8:36 AM EST         To:Frank Lombardi, DO    Subject:Prescription Question    I wanted to know if you would recommend me taking Ozempic or something similar to assist me with weight loss.

## 2024-01-31 NOTE — TELEPHONE ENCOUNTER
Left message on machine for patient to call office back. Please offer patient an appointment to discuss medication.     Chasity Smith LPN

## 2024-02-01 NOTE — TELEPHONE ENCOUNTER
Left message on machine for patient to call office back. Please offer patient an appointment to discuss medication.

## 2024-03-04 DIAGNOSIS — Z13.6 SCREENING FOR CARDIOVASCULAR CONDITION: Primary | ICD-10-CM

## 2024-03-04 DIAGNOSIS — Z12.5 SCREENING FOR PROSTATE CANCER: ICD-10-CM

## 2024-03-07 LAB
ALBUMIN SERPL-MCNC: 4.5 G/DL (ref 4.1–5.1)
ALBUMIN/GLOB SERPL: 2 {RATIO} (ref 1.2–2.2)
ALP SERPL-CCNC: 72 IU/L (ref 44–121)
ALT SERPL-CCNC: 46 IU/L (ref 0–44)
AST SERPL-CCNC: 28 IU/L (ref 0–40)
BASOPHILS # BLD AUTO: 0.1 X10E3/UL (ref 0–0.2)
BASOPHILS NFR BLD AUTO: 1 %
BILIRUB SERPL-MCNC: 1.2 MG/DL (ref 0–1.2)
BUN SERPL-MCNC: 14 MG/DL (ref 6–24)
BUN/CREAT SERPL: 15 (ref 9–20)
CALCIUM SERPL-MCNC: 10.1 MG/DL (ref 8.7–10.2)
CHLORIDE SERPL-SCNC: 106 MMOL/L (ref 96–106)
CHOLEST SERPL-MCNC: 157 MG/DL (ref 100–199)
CO2 SERPL-SCNC: 23 MMOL/L (ref 20–29)
CREAT SERPL-MCNC: 0.93 MG/DL (ref 0.76–1.27)
EGFR: 101 ML/MIN/1.73
EOSINOPHIL # BLD AUTO: 0.1 X10E3/UL (ref 0–0.4)
EOSINOPHIL NFR BLD AUTO: 3 %
ERYTHROCYTE [DISTWIDTH] IN BLOOD BY AUTOMATED COUNT: 12.6 % (ref 11.6–15.4)
GLOBULIN SER-MCNC: 2.2 G/DL (ref 1.5–4.5)
GLUCOSE SERPL-MCNC: 101 MG/DL (ref 70–99)
HCT VFR BLD AUTO: 43.4 % (ref 37.5–51)
HDLC SERPL-MCNC: 45 MG/DL
HGB BLD-MCNC: 14.9 G/DL (ref 13–17.7)
IMM GRANULOCYTES # BLD: 0 X10E3/UL (ref 0–0.1)
IMM GRANULOCYTES NFR BLD: 0 %
LDLC SERPL CALC-MCNC: 90 MG/DL (ref 0–99)
LYMPHOCYTES # BLD AUTO: 1.9 X10E3/UL (ref 0.7–3.1)
LYMPHOCYTES NFR BLD AUTO: 51 %
MCH RBC QN AUTO: 32 PG (ref 26.6–33)
MCHC RBC AUTO-ENTMCNC: 34.3 G/DL (ref 31.5–35.7)
MCV RBC AUTO: 93 FL (ref 79–97)
MICRODELETION SYND BLD/T FISH: NORMAL
MONOCYTES # BLD AUTO: 0.3 X10E3/UL (ref 0.1–0.9)
MONOCYTES NFR BLD AUTO: 8 %
NEUTROPHILS # BLD AUTO: 1.4 X10E3/UL (ref 1.4–7)
NEUTROPHILS NFR BLD AUTO: 37 %
PLATELET # BLD AUTO: 153 X10E3/UL (ref 150–450)
POTASSIUM SERPL-SCNC: 4.6 MMOL/L (ref 3.5–5.2)
PROT SERPL-MCNC: 6.7 G/DL (ref 6–8.5)
PSA SERPL-MCNC: 0.4 NG/ML (ref 0–4)
RBC # BLD AUTO: 4.66 X10E6/UL (ref 4.14–5.8)
SODIUM SERPL-SCNC: 143 MMOL/L (ref 134–144)
TRIGL SERPL-MCNC: 123 MG/DL (ref 0–149)
WBC # BLD AUTO: 3.7 X10E3/UL (ref 3.4–10.8)

## 2024-03-08 NOTE — RESULT ENCOUNTER NOTE
Will discuss labs at follow up appt
Will discuss labs at follow up appt 
Otc Regimen: SPF 30+ to exposed areas of skin daily.
Detail Level: Zone

## 2024-03-14 ENCOUNTER — OFFICE VISIT (OUTPATIENT)
Dept: FAMILY MEDICINE CLINIC | Facility: CLINIC | Age: 49
End: 2024-03-14
Payer: COMMERCIAL

## 2024-03-14 VITALS
TEMPERATURE: 97.5 F | RESPIRATION RATE: 16 BRPM | HEART RATE: 58 BPM | HEIGHT: 74 IN | BODY MASS INDEX: 38.24 KG/M2 | DIASTOLIC BLOOD PRESSURE: 90 MMHG | SYSTOLIC BLOOD PRESSURE: 150 MMHG | WEIGHT: 298 LBS

## 2024-03-14 DIAGNOSIS — Z12.11 SCREEN FOR COLON CANCER: ICD-10-CM

## 2024-03-14 DIAGNOSIS — K76.0 FATTY LIVER DISEASE, NONALCOHOLIC: ICD-10-CM

## 2024-03-14 DIAGNOSIS — E78.2 MIXED HYPERLIPIDEMIA: ICD-10-CM

## 2024-03-14 DIAGNOSIS — Z00.00 WELL ADULT EXAM: ICD-10-CM

## 2024-03-14 DIAGNOSIS — E66.01 CLASS 2 SEVERE OBESITY DUE TO EXCESS CALORIES WITH SERIOUS COMORBIDITY AND BODY MASS INDEX (BMI) OF 38.0 TO 38.9 IN ADULT: ICD-10-CM

## 2024-03-14 DIAGNOSIS — I10 BENIGN ESSENTIAL HYPERTENSION: Primary | ICD-10-CM

## 2024-03-14 DIAGNOSIS — M79.644 FINGER PAIN, RIGHT: ICD-10-CM

## 2024-03-14 PROBLEM — Z79.899 LONG TERM CURRENT USE OF ANTIARRHYTHMIC MEDICAL THERAPY: Status: RESOLVED | Noted: 2020-06-16 | Resolved: 2024-03-14

## 2024-03-14 PROBLEM — R00.2 PALPITATIONS: Status: RESOLVED | Noted: 2017-09-22 | Resolved: 2024-03-14

## 2024-03-14 PROBLEM — T75.3XXA MOTION SICKNESS: Status: RESOLVED | Noted: 2023-08-23 | Resolved: 2024-03-14

## 2024-03-14 PROBLEM — R73.03 PREDIABETES: Status: RESOLVED | Noted: 2021-09-23 | Resolved: 2024-03-14

## 2024-03-14 PROCEDURE — 99214 OFFICE O/P EST MOD 30 MIN: CPT | Performed by: FAMILY MEDICINE

## 2024-03-14 PROCEDURE — 99396 PREV VISIT EST AGE 40-64: CPT | Performed by: FAMILY MEDICINE

## 2024-03-14 RX ORDER — OLMESARTAN MEDOXOMIL AND HYDROCHLOROTHIAZIDE 20/12.5 20; 12.5 MG/1; MG/1
1 TABLET ORAL DAILY
Qty: 90 TABLET | Refills: 1 | Status: SHIPPED | OUTPATIENT
Start: 2024-03-14 | End: 2024-09-10

## 2024-03-14 RX ORDER — ATORVASTATIN CALCIUM 40 MG/1
40 TABLET, FILM COATED ORAL
Qty: 90 TABLET | Refills: 1 | Status: SHIPPED | OUTPATIENT
Start: 2024-03-14 | End: 2024-09-10

## 2024-03-14 NOTE — ASSESSMENT & PLAN NOTE
Pt states stopped taking the norvasc - states he felt like his heart was slowing down and skipping a beet.  He stopped fro two weeks it stopped when he restarted it started up again

## 2024-03-14 NOTE — PROGRESS NOTES
FAMILY PRACTICE HEALTH MAINTENANCE OFFICE VISIT  [facility] - Merged with [facility]    NAME: Sidney Jean  AGE: 48 y.o. SEX: male  : 1975     DATE: 3/14/2024    Assessment and Plan     1. Benign essential hypertension  Assessment & Plan:  Pt states stopped taking the norvasc - states he felt like his heart was slowing down and skipping a beet.  He stopped fro two weeks it stopped when he restarted it started up again    Orders:  -     olmesartan-hydrochlorothiazide (BENICAR HCT) 20-12.5 MG per tablet; Take 1 tablet by mouth daily    2. Well adult exam    3. Mixed hyperlipidemia  -     atorvastatin (LIPITOR) 40 mg tablet; Take 1 tablet (40 mg total) by mouth daily at bedtime    4. Fatty liver disease, nonalcoholic    5. Class 2 severe obesity due to excess calories with serious comorbidity and body mass index (BMI) of 38.0 to 38.9 in adult   -     Semaglutide-Weight Management (WEGOVY) 0.25 MG/0.5ML; Inject 0.5 mL (0.25 mg total) under the skin once a week    6. Screen for colon cancer  -     Ambulatory Referral to Gastroenterology; Future    7. Finger pain, right  -     XR finger right second digit-index; Future; Expected date: 2024        Patient Counseling:   Nutrition: Stressed importance of a well balanced diet, moderation of sodium/saturated fat, caloric balance and sufficient intake of fiber  Exercise: Stressed the importance of regular exercise with a goal of 150 minutes per week  Dental Health: Discussed daily flossing and brushing and regular dental visits     Immunizations reviewed: Up To Date  Discussed benefits of:  Colon Cancer Screening, Prostate Cancer Screening , and Screening labs.  BMI Counseling: Body mass index is 38.26 kg/m². Discussed with patient's BMI with him. The BMI is above normal. Nutrition recommendations include reducing portion sizes.    Return in about 4 weeks (around 2024) for Recheck bp and weight.        Chief Complaint     Chief Complaint    Patient presents with   • Physical Exam     Cecelia Gordillo CMA        History of Present Illness     Pt is sched for a full physical    Pt is asking about his sugars being slightly elevated.    Pt asking about going on ozempic for weight controll , bringing his pressure and sugar down    Pt states rt pointer finger he thinks he may have jammed it playing football.  States he puleld it out.  States no bruising .  Pt states its still tight and does not bend all the way          Well Adult Physical   Patient here for a comprehensive physical exam.      Diet and Physical Activity  Diet: well balanced diet  Exercise: intermittently      Depression Screen  PHQ-2/9 Depression Screening    Little interest or pleasure in doing things: 0 - not at all  Feeling down, depressed, or hopeless: 0 - not at all  PHQ-2 Score: 0  PHQ-2 Interpretation: Negative depression screen          General Health  Hearing: Normal:  bilateral  Vision: wears glasses  Dental: regular dental visits    Reproductive Health  No issues       The following portions of the patient's history were reviewed and updated as appropriate: allergies, current medications, past family history, past medical history, past social history, past surgical history and problem list.    Review of Systems     Review of Systems   Constitutional:  Negative for activity change, appetite change, chills, diaphoresis, fatigue, fever and unexpected weight change.   HENT:  Negative for congestion, dental problem, ear pain, mouth sores, sinus pressure, sinus pain, sore throat and trouble swallowing.    Eyes:  Negative for photophobia, discharge and itching.   Respiratory:  Negative for apnea, chest tightness and shortness of breath.    Cardiovascular:  Negative for chest pain, palpitations and leg swelling.   Gastrointestinal:  Negative for abdominal distention, abdominal pain, blood in stool, nausea and vomiting.   Endocrine: Negative for cold intolerance, heat intolerance,  polydipsia, polyphagia and polyuria.   Genitourinary:  Negative for difficulty urinating.   Musculoskeletal:  Negative for arthralgias.   Skin:  Negative for color change and wound.   Neurological:  Negative for dizziness, syncope, speech difficulty and headaches.   Hematological:  Negative for adenopathy.   Psychiatric/Behavioral:  Negative for agitation and behavioral problems.        Past Medical History     Past Medical History:   Diagnosis Date   • Atrial fibrillation (HCC)     summer 0f 2022 x1(dehydration caused a fib) episode-previously 2019 had afib   • Finger deformity, left 02/16/2016    Resolved:  September 22, 2017   • Fracture of finger, left, closed 05/26/2016    Resolved:  September 22, 2017   • Fracture of proximal phalanx of lesser toe of left foot 08/29/2016    Initial encounter:  Resolved:  September 22, 2017   • H/O vitamin D deficiency    • Hyperlipidemia    • Hypertension    • Neoplasm of uncertain behavior of skin 08/02/2012    Resolved:  February 16, 2016   • Obesity 02/16/2016    Resolved:  January 16, 2018   • Overweight 02/2014    Resolved:  February 16, 2016   • PONV (postoperative nausea and vomiting)     with fever of 101-102-treated with fluids s/p surgery-denied any other side effects with anesthesia   • Sebaceous cyst 11/15/2010       Past Surgical History     Past Surgical History:   Procedure Laterality Date   • FOOT SURGERY Left 2003   • [place] RCNSTJ COLTRL LIGM IPHAL JT 1 W/GRF EA JT Left 8/23/2023    Procedure: RECONSTRUCTION UCL THUMB;  Surgeon: Almaz Stroud MD;  Location: [facility];  Service: Orthopedics   • TONSILLECTOMY     • WISDOM TOOTH EXTRACTION     • WRIST SURGERY Left 2003    titanium plate       Social History     Social History     Socioeconomic History   • Marital status: /Civil Union     Spouse name: None   • Number of children: None   • Years of education: None   • Highest education level: None   Occupational History   • None   Tobacco Use   •  Smoking status: Never     Passive exposure: Past   • Smokeless tobacco: Never   Vaping Use   • Vaping status: Never Used   Substance and Sexual Activity   • Alcohol use: Not Currently     Comment: No lcoholic beverages since January 2023   • Drug use: Never   • Sexual activity: Yes     Partners: Female     Birth control/protection: None   Other Topics Concern   • None   Social History Narrative   • None     Social Determinants of Health     Financial Resource Strain: Not on file   Food Insecurity: Not on file   Transportation Needs: Not on file   Physical Activity: Not on file   Stress: Not on file   Social Connections: Not on file   Intimate Partner Violence: Not on file   Housing Stability: Not on file       Family History     Family History   Problem Relation Age of Onset   • Arthritis Mother    • Hyperlipidemia Mother    • Hypertension Father    • Prostate cancer Father    • Cancer Family    • Diabetes Family    • Heart disease Family    • Other Family         hyperlipoprotein       Current Medications       Current Outpatient Medications:   •  atorvastatin (LIPITOR) 40 mg tablet, Take 1 tablet (40 mg total) by mouth daily at bedtime, Disp: 90 tablet, Rfl: 1  •  olmesartan-hydrochlorothiazide (BENICAR HCT) 20-12.5 MG per tablet, Take 1 tablet by mouth daily, Disp: 90 tablet, Rfl: 1  •  Semaglutide-Weight Management (WEGOVY) 0.25 MG/0.5ML, Inject 0.5 mL (0.25 mg total) under the skin once a week, Disp: 2 mL, Rfl: 0  •  aspirin (ECOTRIN LOW STRENGTH) 81 mg EC tablet, Take 1 tablet (81 mg total) by mouth daily, Disp: 30 tablet, Rfl: 0  •  multivitamin (THERAGRAN) TABS, Take 1 tablet by mouth daily, Disp: , Rfl:   •  nebivolol (BYSTOLIC) 5 mg tablet, Take 1 tablet (5 mg total) by mouth daily, Disp: 90 tablet, Rfl: 3  •  Omega-3 Fatty Acids (Fish Oil) 1200 MG CAPS, Take by mouth in the morning, Disp: , Rfl:      Allergies     No Known Allergies    Objective     /90   Pulse 58   Temp 97.5 °F (36.4 °C)   Resp  "16   Ht 6' 2\" (1.88 m)   Wt 135 kg (298 lb)   BMI 38.26 kg/m²      Physical Exam  Vitals and nursing note reviewed.   Constitutional:       General: He is not in acute distress.     Appearance: He is well-developed. He is not diaphoretic.   HENT:      Head: Normocephalic and atraumatic.      Right Ear: External ear normal.      Left Ear: External ear normal.      Nose: Nose normal.      Mouth/Throat:      Pharynx: No oropharyngeal exudate.   Eyes:      General: No scleral icterus.        Right eye: No discharge.         Left eye: No discharge.      Pupils: Pupils are equal, round, and reactive to light.   Neck:      Thyroid: No thyromegaly.   Cardiovascular:      Rate and Rhythm: Normal rate.      Heart sounds: Normal heart sounds. No murmur heard.  Pulmonary:      Effort: Pulmonary effort is normal. No respiratory distress.      Breath sounds: Normal breath sounds. No wheezing.   Abdominal:      General: Bowel sounds are normal. There is no distension.      Palpations: Abdomen is soft. There is no mass.      Tenderness: There is no abdominal tenderness. There is no guarding or rebound.   Genitourinary:     Testes: Normal.      Prostate: Normal.   Musculoskeletal:         General: Normal range of motion.   Skin:     General: Skin is warm and dry.      Findings: No erythema or rash.   Neurological:      Mental Status: He is alert.      Coordination: Coordination normal.      Deep Tendon Reflexes: Reflexes normal.   Psychiatric:         Behavior: Behavior normal.           Vision Screening - Comments:: Patient just saw his eye doctor a month ago  [facility]  "

## 2024-03-25 ENCOUNTER — OFFICE VISIT (OUTPATIENT)
Dept: FAMILY MEDICINE CLINIC | Facility: CLINIC | Age: 49
End: 2024-03-25
Payer: COMMERCIAL

## 2024-03-25 VITALS
TEMPERATURE: 97.1 F | HEART RATE: 56 BPM | WEIGHT: 293 LBS | HEIGHT: 74 IN | BODY MASS INDEX: 37.6 KG/M2 | SYSTOLIC BLOOD PRESSURE: 130 MMHG | DIASTOLIC BLOOD PRESSURE: 74 MMHG | RESPIRATION RATE: 16 BRPM

## 2024-03-25 DIAGNOSIS — J01.00 ACUTE NON-RECURRENT MAXILLARY SINUSITIS: Primary | ICD-10-CM

## 2024-03-25 DIAGNOSIS — I10 BENIGN ESSENTIAL HYPERTENSION: ICD-10-CM

## 2024-03-25 DIAGNOSIS — I48.0 PAF (PAROXYSMAL ATRIAL FIBRILLATION) (HCC): ICD-10-CM

## 2024-03-25 PROCEDURE — 99214 OFFICE O/P EST MOD 30 MIN: CPT | Performed by: NURSE PRACTITIONER

## 2024-03-25 RX ORDER — AMOXICILLIN 875 MG/1
875 TABLET, COATED ORAL 2 TIMES DAILY
Qty: 20 TABLET | Refills: 0 | Status: SHIPPED | OUTPATIENT
Start: 2024-03-25 | End: 2024-04-04

## 2024-03-25 NOTE — PROGRESS NOTES
Assessment/Plan:    Will treat as below.  Reviewed symptomatic treatment, f/u as needed    1. Acute non-recurrent maxillary sinusitis  -     amoxicillin (AMOXIL) 875 mg tablet; Take 1 tablet (875 mg total) by mouth 2 (two) times a day for 10 days    2. Benign essential hypertension  Assessment & Plan:  stable      3. PAF (paroxysmal atrial fibrillation) ([facility], [facility])  Assessment & Plan:  Management per cardiology             There are no Patient Instructions on file for this visit.    Return if symptoms worsen or fail to improve.    Subjective:      Patient ID: Sidney Jean is a 48 y.o. male.    Chief Complaint   Patient presents with   • Cough   • Earache   • Nasal Congestion     Started 2 days ago JMoyleLPN       Here today with URI symptoms for over a week.  Has worsening sinus pressure  and right ear feels clogged/congested.  Low grade fever over the weekend.  Taking a decongestant OTC.           The following portions of the patient's history were reviewed and updated as appropriate: allergies, current medications, past family history, past medical history, past social history, past surgical history and problem list.    Review of Systems   Constitutional:  Positive for fever. Negative for chills and fatigue.   HENT:  Positive for congestion, ear pain and sinus pressure. Negative for postnasal drip, rhinorrhea and sore throat.    Respiratory:  Positive for cough (dry/painful). Negative for shortness of breath and wheezing.    Cardiovascular:  Negative for chest pain.   Gastrointestinal:  Negative for diarrhea, nausea and vomiting.   Musculoskeletal:  Negative for arthralgias.   Skin:  Negative for rash.   Neurological:  Negative for headaches.         Current Outpatient Medications   Medication Sig Dispense Refill   • amoxicillin (AMOXIL) 875 mg tablet Take 1 tablet (875 mg total) by mouth 2 (two) times a day for 10 days 20 tablet 0   • aspirin (ECOTRIN LOW STRENGTH) 81 mg EC tablet Take 1 tablet (81 mg total) by mouth  "daily 30 tablet 0   • atorvastatin (LIPITOR) 40 mg tablet Take 1 tablet (40 mg total) by mouth daily at bedtime 90 tablet 1   • multivitamin (THERAGRAN) TABS Take 1 tablet by mouth daily     • nebivolol (BYSTOLIC) 5 mg tablet Take 1 tablet (5 mg total) by mouth daily 90 tablet 3   • olmesartan-hydrochlorothiazide (BENICAR HCT) 20-12.5 MG per tablet Take 1 tablet by mouth daily 90 tablet 1   • Omega-3 Fatty Acids (Fish Oil) 1200 MG CAPS Take by mouth in the morning       No current facility-administered medications for this visit.       Objective:    /74   Pulse 56   Temp (!) 97.1 °F (36.2 °C)   Resp 16   Ht 6' 2\" (1.88 m)   Wt 133 kg (293 lb)   BMI 37.62 kg/m²        Physical Exam  Vitals and nursing note reviewed.   Constitutional:       Appearance: Normal appearance. He is well-developed.   HENT:      Right Ear: Ear canal normal. Tympanic membrane is bulging. Tympanic membrane is not erythematous.      Left Ear: Tympanic membrane and ear canal normal.      Nose:      Right Turbinates: Swollen.      Left Turbinates: Swollen.      Right Sinus: Maxillary sinus tenderness present.      Left Sinus: Maxillary sinus tenderness present.   Cardiovascular:      Rate and Rhythm: Normal rate and regular rhythm.      Pulses: Normal pulses.      Heart sounds: Normal heart sounds. No murmur heard.  Pulmonary:      Effort: Pulmonary effort is normal.      Breath sounds: Normal breath sounds.   Skin:     General: Skin is warm and dry.   Neurological:      Mental Status: He is alert.   Psychiatric:         Mood and Affect: Mood normal.         Behavior: Behavior normal.                DAVID Flores  "

## 2024-04-02 ENCOUNTER — TELEPHONE (OUTPATIENT)
Age: 49
End: 2024-04-02

## 2024-04-02 NOTE — TELEPHONE ENCOUNTER
[facility] COLON     Screened by: David Easley MA    Referring Provider recall    Pre- Screening:     There is no height or weight on file to calculate BMI.  Has patient been referred for a routine screening Colonoscopy? yes  Is the patient between 45-75 years old? yes      Previous Colonoscopy yes   If yes:    Date: 2023    Facility:     Reason:       Does the patient want to see a Gastroenterologist prior to their procedure OR are they having any GI symptoms? no    Has the patient been hospitalized or had abdominal surgery in the past 6 months? no    Does the patient use supplemental oxygen? no    Does the patient take Coumadin, Lovenox, Plavix, Elliquis, Xarelto, or other blood thinning medication? no    Has the patient had a stroke, cardiac event, or stent placed in the past year? no    Colonoscopy scheduled    If patient is between 45yrs - 49yrs, please advise patient that we will have to confirm benefits & coverage with their insurance company for a routine screening colonoscopy.      ASC Screening    ASC Screening  BMI > than 45: No  Are you currently pregnant?: No  Do you rely on a wheelchair for mobility?: No  Do you need oxygen during the day?: No  Have you ever been informed by anesthesia that you have a difficult airway?: No  Have you been diagnosed with End Stage Renal Disease (ESRD)?: No  Are you actively on dialysis?: No  Have you been diagnosed with Pulmonary Hypertension?: No  Do you have a pacemaker or an Automatic Implantable Cardioverter Defibrillator (AICD)?: No  Have you ever had an organ transplant?: No  Have you had a stroke, heart attack, myocardial infarction (MI) within the last 6 months?: No  Have you ever been diagnosed with Aortic Stenosis?: No  Have you ever been diagnosed  with Congestive Heart Failure?: No  Have you ever been diagnosed with a heart valve disease?: No  Are you Diabetic?: No  If you are Diabetic, has your A1C been greater than 12 within the last six months?: N/A        Scheduled date of colonoscopy (as of today):5/9/2024  Physician performing colonoscopy:AVIVA  Location of colonoscopy:[facility]  Bowel prep reviewed with patient:M&D  Instructions reviewed with patient by:NW  Clearances: NONE

## 2024-04-25 ENCOUNTER — ANESTHESIA (OUTPATIENT)
Dept: ANESTHESIOLOGY | Facility: HOSPITAL | Age: 49
End: 2024-04-25

## 2024-04-25 ENCOUNTER — ANESTHESIA EVENT (OUTPATIENT)
Dept: ANESTHESIOLOGY | Facility: HOSPITAL | Age: 49
End: 2024-04-25

## 2024-04-30 ENCOUNTER — TELEPHONE (OUTPATIENT)
Age: 49
End: 2024-04-30

## 2024-05-09 ENCOUNTER — ANESTHESIA EVENT (OUTPATIENT)
Dept: GASTROENTEROLOGY | Facility: AMBULARY SURGERY CENTER | Age: 49
End: 2024-05-09

## 2024-05-09 ENCOUNTER — ANESTHESIA (OUTPATIENT)
Dept: GASTROENTEROLOGY | Facility: AMBULARY SURGERY CENTER | Age: 49
End: 2024-05-09

## 2024-05-09 ENCOUNTER — HOSPITAL ENCOUNTER (OUTPATIENT)
Dept: GASTROENTEROLOGY | Facility: AMBULARY SURGERY CENTER | Age: 49
Setting detail: OUTPATIENT SURGERY
End: 2024-05-09
Attending: INTERNAL MEDICINE
Payer: COMMERCIAL

## 2024-05-09 VITALS
TEMPERATURE: 96.7 F | OXYGEN SATURATION: 98 % | HEART RATE: 50 BPM | RESPIRATION RATE: 16 BRPM | SYSTOLIC BLOOD PRESSURE: 132 MMHG | WEIGHT: 285 LBS | HEIGHT: 75 IN | DIASTOLIC BLOOD PRESSURE: 82 MMHG | BODY MASS INDEX: 35.43 KG/M2

## 2024-05-09 DIAGNOSIS — Z86.010 HISTORY OF COLON POLYPS: ICD-10-CM

## 2024-05-09 PROCEDURE — G0105 COLORECTAL SCRN; HI RISK IND: HCPCS | Performed by: INTERNAL MEDICINE

## 2024-05-09 RX ORDER — SODIUM CHLORIDE, SODIUM LACTATE, POTASSIUM CHLORIDE, CALCIUM CHLORIDE 600; 310; 30; 20 MG/100ML; MG/100ML; MG/100ML; MG/100ML
125 INJECTION, SOLUTION INTRAVENOUS CONTINUOUS
Status: DISCONTINUED | OUTPATIENT
Start: 2024-05-09 | End: 2024-05-13 | Stop reason: HOSPADM

## 2024-05-09 RX ORDER — SODIUM CHLORIDE, SODIUM LACTATE, POTASSIUM CHLORIDE, CALCIUM CHLORIDE 600; 310; 30; 20 MG/100ML; MG/100ML; MG/100ML; MG/100ML
INJECTION, SOLUTION INTRAVENOUS CONTINUOUS PRN
Status: DISCONTINUED | OUTPATIENT
Start: 2024-05-09 | End: 2024-05-09

## 2024-05-09 RX ORDER — PROPOFOL 10 MG/ML
INJECTION, EMULSION INTRAVENOUS AS NEEDED
Status: DISCONTINUED | OUTPATIENT
Start: 2024-05-09 | End: 2024-05-09

## 2024-05-09 RX ADMIN — PROPOFOL 150 MG: 10 INJECTION, EMULSION INTRAVENOUS at 12:01

## 2024-05-09 RX ADMIN — PROPOFOL 50 MG: 10 INJECTION, EMULSION INTRAVENOUS at 12:06

## 2024-05-09 RX ADMIN — PROPOFOL 50 MG: 10 INJECTION, EMULSION INTRAVENOUS at 12:14

## 2024-05-09 RX ADMIN — PROPOFOL 50 MG: 10 INJECTION, EMULSION INTRAVENOUS at 12:10

## 2024-05-09 RX ADMIN — SODIUM CHLORIDE, SODIUM LACTATE, POTASSIUM CHLORIDE, AND CALCIUM CHLORIDE 125 ML/HR: .6; .31; .03; .02 INJECTION, SOLUTION INTRAVENOUS at 11:51

## 2024-05-09 RX ADMIN — SODIUM CHLORIDE, SODIUM LACTATE, POTASSIUM CHLORIDE, AND CALCIUM CHLORIDE: .6; .31; .03; .02 INJECTION, SOLUTION INTRAVENOUS at 11:19

## 2024-05-09 NOTE — H&P
History and Physical -  [facility]  Sidney Jean 48 y.o. male MRN: [id number]        HPI: 48-year-old male with history of colon polyps had inadequate prep last year.  Regular bowel movements.    Historical Information   Past Medical History:   Diagnosis Date    Atrial fibrillation (HCC)     summer 0f 2022 x1(dehydration caused a fib) episode-previously 2019 had afib    Finger deformity, left 02/16/2016    Resolved:  September 22, 2017    Fracture of finger, left, closed 05/26/2016    Resolved:  September 22, 2017    Fracture of proximal phalanx of lesser toe of left foot 08/29/2016    Initial encounter:  Resolved:  September 22, 2017    H/O vitamin D deficiency     Hyperlipidemia     Hypertension     Neoplasm of uncertain behavior of skin 08/02/2012    Resolved:  February 16, 2016    Obesity 02/16/2016    Resolved:  January 16, 2018    Overweight 02/2014    Resolved:  February 16, 2016    PONV (postoperative nausea and vomiting)     with fever of 101-102-treated with fluids s/p surgery-denied any other side effects with anesthesia    Sebaceous cyst 11/15/2010     Past Surgical History:   Procedure Laterality Date    FOOT SURGERY Left 2003    [place] RCNSTJ COLTRL LIGM IPHAL JT 1 W/GRF EA JT Left 8/23/2023    Procedure: RECONSTRUCTION UCL THUMB;  Surgeon: Almaz Stroud MD;  Location: [facility] OR;  Service: Orthopedics    TONSILLECTOMY      WISDOM TOOTH EXTRACTION      WRIST SURGERY Left 2003    titanium plate     Social History   Social History     Substance and Sexual Activity   Alcohol Use Not Currently    Comment: No lcoholic beverages since January 2023     Social History     Substance and Sexual Activity   Drug Use Never     Social History     Tobacco Use   Smoking Status Never    Passive exposure: Past   Smokeless Tobacco Never     Family History   Problem Relation Age of Onset    Arthritis Mother     Hyperlipidemia Mother     Hypertension Father     Prostate cancer Father     Cancer  "Family     Diabetes Family     Heart disease Family     Other Family         hyperlipoprotein       Meds/Allergies     (Not in a hospital admission)      No Known Allergies    Objective     Blood pressure 148/75, pulse (!) 45, temperature (!) 96.7 °F (35.9 °C), temperature source Tympanic, resp. rate 18, height 6' 3\" (1.905 m), weight 129 kg (285 lb), SpO2 97%.    Physical Exam:    Chest- CTA  Heart- RRR  Abdomen- NT/ND  Extremities- No edema    ASSESSMENT:     History of colon polyps    PLAN:    Colonoscopy              "

## 2024-05-09 NOTE — ANESTHESIA PREPROCEDURE EVALUATION
Procedure:  COLONOSCOPY    Relevant Problems   ANESTHESIA   (+) PONV (postoperative nausea and vomiting)      CARDIO   (+) Benign essential hypertension   (+) Mixed hyperlipidemia   (+) PAF (paroxysmal atrial fibrillation) (HCC)      GI/HEPATIC   (+) Fatty liver disease, nonalcoholic      Other   (+) Class 2 severe obesity due to excess calories with serious comorbidity and body mass index (BMI) of 38.0 to 38.9 in adult (HCC)   Mild NO--CPAP declined  Low AF burden per last cardiology note in 2023.     Physical Exam    Airway    Mallampati score: II  TM Distance: >3 FB  Neck ROM: full     Dental   Comment: Denies loose teeth     Cardiovascular  Cardiovascular exam normal    Pulmonary  Pulmonary exam normal     Other Findings  Portions of exam deferred due to low yield and/or unknown COVID status      Anesthesia Plan  ASA Score- 2     Anesthesia Type- IV sedation with anesthesia with ASA Monitors.         Additional Monitors:     Airway Plan:            Plan Factors-Exercise tolerance (METS): >4 METS.    Chart reviewed.   Existing labs reviewed. Patient summary reviewed.    Patient is not a current smoker.              Induction- intravenous.    Postoperative Plan-     Informed Consent- Anesthetic plan and risks discussed with patient.  I personally reviewed this patient with the CRNA. Discussed and agreed on the Anesthesia Plan with the CRNA..

## 2024-05-09 NOTE — ANESTHESIA POSTPROCEDURE EVALUATION
Post-Op Assessment Note    CV Status:  Stable    Pain management: adequate       Mental Status:  Alert and awake   Hydration Status:  Euvolemic   PONV Controlled:  Controlled   Airway Patency:  Patent     Post Op Vitals Reviewed: Yes    No anethesia notable event occurred.    Staff: CRNA               BP   130/55   Temp   97.9   Pulse  56   Resp   18   SpO2   99

## 2024-05-24 ENCOUNTER — OFFICE VISIT (OUTPATIENT)
Dept: FAMILY MEDICINE CLINIC | Facility: CLINIC | Age: 49
End: 2024-05-24
Payer: COMMERCIAL

## 2024-05-24 VITALS
TEMPERATURE: 98.4 F | HEART RATE: 64 BPM | RESPIRATION RATE: 18 BRPM | DIASTOLIC BLOOD PRESSURE: 82 MMHG | WEIGHT: 293 LBS | BODY MASS INDEX: 36.62 KG/M2 | SYSTOLIC BLOOD PRESSURE: 140 MMHG

## 2024-05-24 DIAGNOSIS — I48.0 PAF (PAROXYSMAL ATRIAL FIBRILLATION) (HCC): ICD-10-CM

## 2024-05-24 DIAGNOSIS — E78.2 MIXED HYPERLIPIDEMIA: ICD-10-CM

## 2024-05-24 DIAGNOSIS — I10 BENIGN ESSENTIAL HYPERTENSION: Primary | ICD-10-CM

## 2024-05-24 PROCEDURE — 99214 OFFICE O/P EST MOD 30 MIN: CPT | Performed by: FAMILY MEDICINE

## 2024-05-24 RX ORDER — NEBIVOLOL 5 MG/1
5 TABLET ORAL DAILY
Qty: 90 TABLET | Refills: 3 | Status: SHIPPED | OUTPATIENT
Start: 2024-05-24

## 2024-05-24 RX ORDER — LISINOPRIL AND HYDROCHLOROTHIAZIDE 20; 12.5 MG/1; MG/1
1 TABLET ORAL DAILY
Qty: 90 TABLET | Refills: 1 | Status: SHIPPED | OUTPATIENT
Start: 2024-05-24

## 2024-05-24 NOTE — PROGRESS NOTES
Assessment/Plan:    1. Benign essential hypertension  -     lisinopril-hydrochlorothiazide (PRINZIDE,ZESTORETIC) 20-12.5 MG per tablet; Take 1 tablet by mouth daily  -     nebivolol (BYSTOLIC) 5 mg tablet; Take 1 tablet (5 mg total) by mouth daily  -     Comprehensive metabolic panel; Future  -     Lipid Panel with Direct LDL reflex; Future  2. PAF (paroxysmal atrial fibrillation) (HCC)  -     nebivolol (BYSTOLIC) 5 mg tablet; Take 1 tablet (5 mg total) by mouth daily  3. Mixed hyperlipidemia  -     Comprehensive metabolic panel; Future  -     Lipid Panel with Direct LDL reflex; Future      No issues with palps  Does seem to be having sexual side affects to benicar    There are no Patient Instructions on file for this visit.    Return follow up labs and BP imn sept.    Subjective:      Patient ID: Sidney Jean is a 48 y.o. male.    Chief Complaint   Patient presents with   • Follow-up   • Hypertension     Sas/cma       Pt is sched for a follow up  Pt states he feels he is having sexual side affects from the benicar        The following portions of the patient's history were reviewed and updated as appropriate: allergies, current medications, past family history, past medical history, past social history, past surgical history and problem list.    Review of Systems      Current Outpatient Medications   Medication Sig Dispense Refill   • aspirin (ECOTRIN LOW STRENGTH) 81 mg EC tablet Take 1 tablet (81 mg total) by mouth daily 30 tablet 0   • atorvastatin (LIPITOR) 40 mg tablet Take 1 tablet (40 mg total) by mouth daily at bedtime 90 tablet 1   • lisinopril-hydrochlorothiazide (PRINZIDE,ZESTORETIC) 20-12.5 MG per tablet Take 1 tablet by mouth daily 90 tablet 1   • multivitamin (THERAGRAN) TABS Take 1 tablet by mouth daily     • nebivolol (BYSTOLIC) 5 mg tablet Take 1 tablet (5 mg total) by mouth daily 90 tablet 3   • Omega-3 Fatty Acids (Fish Oil) 1200 MG CAPS Take by mouth in the morning       No current  facility-administered medications for this visit.       Objective:    /82   Pulse 64   Temp 98.4 °F (36.9 °C)   Resp 18   Wt 133 kg (293 lb)   BMI 36.62 kg/m²        Physical Exam  Vitals and nursing note reviewed.   Constitutional:       General: He is not in acute distress.     Appearance: He is well-developed. He is not diaphoretic.   HENT:      Head: Normocephalic and atraumatic.      Right Ear: External ear normal.      Left Ear: External ear normal.      Nose: Nose normal.      Mouth/Throat:      Pharynx: No oropharyngeal exudate.   Eyes:      General: No scleral icterus.        Right eye: No discharge.         Left eye: No discharge.      Pupils: Pupils are equal, round, and reactive to light.   Neck:      Thyroid: No thyromegaly.   Cardiovascular:      Rate and Rhythm: Normal rate.      Heart sounds: Normal heart sounds. No murmur heard.  Pulmonary:      Effort: Pulmonary effort is normal. No respiratory distress.      Breath sounds: Normal breath sounds. No wheezing.   Abdominal:      General: Bowel sounds are normal. There is no distension.      Palpations: Abdomen is soft. There is no mass.      Tenderness: There is no abdominal tenderness. There is no guarding or rebound.   Musculoskeletal:         General: Normal range of motion.   Skin:     General: Skin is warm and dry.      Findings: No erythema or rash.   Neurological:      Mental Status: He is alert.      Coordination: Coordination normal.      Deep Tendon Reflexes: Reflexes normal.   Psychiatric:         Behavior: Behavior normal.                Frank Lombardi, DO

## 2024-07-09 ENCOUNTER — TELEPHONE (OUTPATIENT)
Age: 49
End: 2024-07-09

## 2024-07-09 DIAGNOSIS — H10.9 CONJUNCTIVITIS, UNSPECIFIED CONJUNCTIVITIS TYPE, UNSPECIFIED LATERALITY: Primary | ICD-10-CM

## 2024-07-09 RX ORDER — NEOMYCIN/POLYMYXIN B/HYDROCORT 3.5-10K-1
1 SUSPENSION, DROPS(FINAL DOSAGE FORM)(ML) OPHTHALMIC (EYE) 3 TIMES DAILY
Qty: 7.5 ML | Refills: 0 | Status: SHIPPED | OUTPATIENT
Start: 2024-07-09

## 2024-07-09 NOTE — TELEPHONE ENCOUNTER
Patient called in and RN confrimed order for   neomycin-polymyxin-hydrocortisone (CORTISPORIN) 0.35%-10,000 units/mL-1% ophthalmic suspension was sent in to pharmacy. Patient was appreciative.

## 2024-07-09 NOTE — TELEPHONE ENCOUNTER
I usually dont write meds without seeing a patient but I escribed for him   Universal Safety Interventions

## 2024-07-09 NOTE — TELEPHONE ENCOUNTER
Pt called to say his son had pink eye two weeks ago and last night the pt started with a yellow-green discharge, redness, itching and pain in both eyes. Pt declined an appt and would [facility]. Please advise.

## 2024-07-26 ENCOUNTER — TELEPHONE (OUTPATIENT)
Age: 49
End: 2024-07-26

## 2024-07-26 DIAGNOSIS — H10.509 BLEPHAROCONJUNCTIVITIS, UNSPECIFIED BLEPHAROCONJUNCTIVITIS TYPE, UNSPECIFIED LATERALITY: Primary | ICD-10-CM

## 2024-07-26 DIAGNOSIS — H10.9 CONJUNCTIVITIS, UNSPECIFIED CONJUNCTIVITIS TYPE, UNSPECIFIED LATERALITY: ICD-10-CM

## 2024-07-26 RX ORDER — NEOMYCIN/POLYMYXIN B/HYDROCORT 3.5-10K-1
1 SUSPENSION, DROPS(FINAL DOSAGE FORM)(ML) OPHTHALMIC (EYE) 3 TIMES DAILY
Qty: 7.5 ML | Refills: 0 | Status: SHIPPED | OUTPATIENT
Start: 2024-07-26 | End: 2024-07-26

## 2024-07-26 RX ORDER — NEOMYCIN SULFATE, POLYMYXIN B SULFATE AND DEXAMETHASONE 3.5; 10000; 1 MG/ML; [USP'U]/ML; MG/ML
1 SUSPENSION/ DROPS OPHTHALMIC 4 TIMES DAILY
Qty: 5 ML | Refills: 0 | Status: SHIPPED | OUTPATIENT
Start: 2024-07-26

## 2024-07-26 NOTE — TELEPHONE ENCOUNTER
Pt is calling looking for refill on eye drops for pink eye.  He stated he originally had it in his right eye and now it is in his left eye and he is out of drops

## 2024-07-26 NOTE — TELEPHONE ENCOUNTER
Ochoa Pharmacist from [facility] is calling because they do not have neomycin-polymyxin-hydrocortisone (CORTISPORIN) 0.35%-10,000 units/mL-1% ophthalmic suspension [ does not have in stock . Pharmacist would like to  change to the Maxitrol eye drops  which is in stock. Please call and advise . Thank you.

## 2024-09-23 DIAGNOSIS — E78.2 MIXED HYPERLIPIDEMIA: ICD-10-CM

## 2024-09-24 RX ORDER — ATORVASTATIN CALCIUM 40 MG/1
40 TABLET, FILM COATED ORAL
Qty: 90 TABLET | Refills: 1 | Status: SHIPPED | OUTPATIENT
Start: 2024-09-24

## 2024-11-24 DIAGNOSIS — I10 BENIGN ESSENTIAL HYPERTENSION: ICD-10-CM

## 2024-11-26 RX ORDER — LISINOPRIL AND HYDROCHLOROTHIAZIDE 12.5; 2 MG/1; MG/1
TABLET ORAL
Qty: 90 TABLET | Refills: 1 | Status: SHIPPED | OUTPATIENT
Start: 2024-11-26

## 2024-12-10 ENCOUNTER — VBI (OUTPATIENT)
Dept: ADMINISTRATIVE | Facility: OTHER | Age: 49
End: 2024-12-10

## 2024-12-10 NOTE — TELEPHONE ENCOUNTER
12/10/24 2:25 PM     Chart reviewed for Blood Pressure was/were submitted to the patient's insurance.  Depression Screening    Alise Ling MA   [facility]

## 2025-03-05 ENCOUNTER — RESULTS FOLLOW-UP (OUTPATIENT)
Dept: FAMILY MEDICINE CLINIC | Facility: CLINIC | Age: 50
End: 2025-03-05

## 2025-03-05 LAB
ALBUMIN SERPL-MCNC: 4.9 G/DL (ref 4.1–5.1)
ALP SERPL-CCNC: 62 IU/L (ref 44–121)
ALT SERPL-CCNC: 50 IU/L (ref 0–44)
AST SERPL-CCNC: 33 IU/L (ref 0–40)
BASOPHILS # BLD AUTO: 0.1 X10E3/UL (ref 0–0.2)
BASOPHILS NFR BLD AUTO: 1 %
BILIRUB SERPL-MCNC: 1.5 MG/DL (ref 0–1.2)
BUN SERPL-MCNC: 16 MG/DL (ref 6–24)
BUN/CREAT SERPL: 16 (ref 9–20)
CALCIUM SERPL-MCNC: 10.9 MG/DL (ref 8.7–10.2)
CHLORIDE SERPL-SCNC: 105 MMOL/L (ref 96–106)
CHOLEST SERPL-MCNC: 134 MG/DL (ref 100–199)
CO2 SERPL-SCNC: 23 MMOL/L (ref 20–29)
CREAT SERPL-MCNC: 0.98 MG/DL (ref 0.76–1.27)
EGFR: 95 ML/MIN/1.73
EOSINOPHIL # BLD AUTO: 0.1 X10E3/UL (ref 0–0.4)
EOSINOPHIL NFR BLD AUTO: 2 %
ERYTHROCYTE [DISTWIDTH] IN BLOOD BY AUTOMATED COUNT: 12.3 % (ref 11.6–15.4)
GLOBULIN SER-MCNC: 2.1 G/DL (ref 1.5–4.5)
GLUCOSE SERPL-MCNC: 95 MG/DL (ref 70–99)
HCT VFR BLD AUTO: 47.8 % (ref 37.5–51)
HDLC SERPL-MCNC: 42 MG/DL
HGB BLD-MCNC: 16.1 G/DL (ref 13–17.7)
IMM GRANULOCYTES # BLD: 0 X10E3/UL (ref 0–0.1)
IMM GRANULOCYTES NFR BLD: 0 %
LDLC SERPL CALC-MCNC: 71 MG/DL (ref 0–99)
LYMPHOCYTES # BLD AUTO: 1.9 X10E3/UL (ref 0.7–3.1)
LYMPHOCYTES NFR BLD AUTO: 33 %
MCH RBC QN AUTO: 31.5 PG (ref 26.6–33)
MCHC RBC AUTO-ENTMCNC: 33.7 G/DL (ref 31.5–35.7)
MCV RBC AUTO: 94 FL (ref 79–97)
MICRODELETION SYND BLD/T FISH: NORMAL
MONOCYTES # BLD AUTO: 0.4 X10E3/UL (ref 0.1–0.9)
MONOCYTES NFR BLD AUTO: 7 %
NEUTROPHILS # BLD AUTO: 3.3 X10E3/UL (ref 1.4–7)
NEUTROPHILS NFR BLD AUTO: 57 %
PLATELET # BLD AUTO: 162 X10E3/UL (ref 150–450)
POTASSIUM SERPL-SCNC: 4.7 MMOL/L (ref 3.5–5.2)
PROT SERPL-MCNC: 7 G/DL (ref 6–8.5)
PSA SERPL-MCNC: 0.6 NG/ML (ref 0–4)
RBC # BLD AUTO: 5.11 X10E6/UL (ref 4.14–5.8)
SODIUM SERPL-SCNC: 142 MMOL/L (ref 134–144)
TRIGL SERPL-MCNC: 117 MG/DL (ref 0–149)
WBC # BLD AUTO: 5.8 X10E3/UL (ref 3.4–10.8)

## 2025-03-14 ENCOUNTER — OFFICE VISIT (OUTPATIENT)
Dept: FAMILY MEDICINE CLINIC | Facility: CLINIC | Age: 50
End: 2025-03-14
Payer: COMMERCIAL

## 2025-03-14 VITALS
HEIGHT: 74 IN | BODY MASS INDEX: 36.32 KG/M2 | TEMPERATURE: 96.9 F | SYSTOLIC BLOOD PRESSURE: 122 MMHG | HEART RATE: 52 BPM | WEIGHT: 283 LBS | RESPIRATION RATE: 16 BRPM | DIASTOLIC BLOOD PRESSURE: 70 MMHG

## 2025-03-14 DIAGNOSIS — E83.52 HYPERCALCEMIA: ICD-10-CM

## 2025-03-14 DIAGNOSIS — R79.89 LOW TESTOSTERONE: ICD-10-CM

## 2025-03-14 DIAGNOSIS — I48.0 PAF (PAROXYSMAL ATRIAL FIBRILLATION) (HCC): ICD-10-CM

## 2025-03-14 DIAGNOSIS — E78.2 MIXED HYPERLIPIDEMIA: ICD-10-CM

## 2025-03-14 DIAGNOSIS — K76.0 FATTY LIVER DISEASE, NONALCOHOLIC: ICD-10-CM

## 2025-03-14 DIAGNOSIS — Z00.00 ANNUAL PHYSICAL EXAM: Primary | ICD-10-CM

## 2025-03-14 DIAGNOSIS — I10 BENIGN ESSENTIAL HYPERTENSION: ICD-10-CM

## 2025-03-14 PROCEDURE — 99396 PREV VISIT EST AGE 40-64: CPT | Performed by: FAMILY MEDICINE

## 2025-03-14 RX ORDER — NEBIVOLOL 2.5 MG/1
2.5 TABLET ORAL DAILY
Qty: 100 TABLET | Refills: 1 | Status: SHIPPED | OUTPATIENT
Start: 2025-03-14 | End: 2025-09-10

## 2025-03-14 NOTE — ASSESSMENT & PLAN NOTE
Pt would like to lower the lipid meds, will follow in 6 mionths    Orders:    Comprehensive metabolic panel; Future    Lipid Panel with Direct LDL reflex; Future     DISCHARGE

## 2025-03-14 NOTE — PROGRESS NOTES
Adult Annual Physical  Name: Sidney Jean      : 1975      MRN: [id number]  Encounter Provider: Frank Lombardi, DO  Encounter Date: 3/14/2025   Encounter department: [facility]    Assessment & Plan  Annual physical exam         Benign essential hypertension  Stable - pt ran out of the lisinopril hct a month and a half ago and never refilled it - so is not on that.  PT just taking the 5 of bystolic    Orders:    nebivolol (BYSTOLIC) 2.5 mg tablet; Take 1 tablet (2.5 mg total) by mouth daily    PAF (paroxysmal atrial fibrillation) (HCC)  No palps since he started his healthy living    Orders:    nebivolol (BYSTOLIC) 2.5 mg tablet; Take 1 tablet (2.5 mg total) by mouth daily    Mixed hyperlipidemia  Pt would like to lower the lipid meds, will follow in 6 mionths    Orders:    Comprehensive metabolic panel; Future    Lipid Panel with Direct LDL reflex; Future    Fatty liver disease, nonalcoholic         Hypercalcemia    Orders:    Comprehensive metabolic panel; Future    PTH, intact; Future    Calcium, ionized; Future    Low testosterone         Preventive Screenings:    - Prostate cancer screening: screening up-to-date     Immunizations:  - Immunizations due: Influenza         History of Present Illness     Adult Annual Physical:  Patient presents for annual physical. Pt is sched for a full physical  Pt states he started a glp1 6 weeks ago for weight loss - he is doen weight  Caloric rest diet.     Diet and Physical Activity:  - Diet/Nutrition: low fat diet.  - Exercise: moderate cardiovascular exercise.    Depression Screening:  - PHQ-2 Score: 0    General Health:  - Sleep: sleeps well.  - Hearing: normal hearing bilateral ears.  - Vision: wears glasses.  - Dental: regular dental visits.    Review of Systems   Constitutional:  Negative for activity change, appetite change, chills, diaphoresis, fatigue, fever and unexpected weight change.   HENT:  Negative for congestion, dental problem, ear  pain, mouth sores, sinus pressure, sinus pain, sore throat and trouble swallowing.    Eyes:  Negative for photophobia, discharge and itching.   Respiratory:  Negative for apnea, chest tightness and shortness of breath.    Cardiovascular:  Negative for chest pain, palpitations and leg swelling.   Gastrointestinal:  Negative for abdominal distention, abdominal pain, blood in stool, nausea and vomiting.   Endocrine: Negative for cold intolerance, heat intolerance, polydipsia, polyphagia and polyuria.   Genitourinary:  Negative for difficulty urinating.   Musculoskeletal:  Negative for arthralgias.   Skin:  Negative for color change and wound.   Neurological:  Negative for dizziness, syncope, speech difficulty and headaches.   Hematological:  Negative for adenopathy.   Psychiatric/Behavioral:  Negative for agitation and behavioral problems.      Pertinent Medical History           Medical History Reviewed by provider this encounter:     .  Past Medical History   Past Medical History:   Diagnosis Date    Atrial fibrillation (HCC)     summer 0f 2022 x1(dehydration caused a fib) episode-previously 2019 had afib    Finger deformity, left 02/16/2016    Resolved:  September 22, 2017    Fracture of finger, left, closed 05/26/2016    Resolved:  September 22, 2017    Fracture of proximal phalanx of lesser toe of left foot 08/29/2016    Initial encounter:  Resolved:  September 22, 2017    H/O vitamin D deficiency     Hyperlipidemia     Hypertension     Neoplasm of uncertain behavior of skin 08/02/2012    Resolved:  February 16, 2016    Obesity 02/16/2016    Resolved:  January 16, 2018    Overweight 02/2014    Resolved:  February 16, 2016    PONV (postoperative nausea and vomiting)     with fever of 101-102-treated with fluids s/p surgery-denied any other side effects with anesthesia    Sebaceous cyst 11/15/2010     Past Surgical History:   Procedure Laterality Date    FOOT SURGERY Left 2003    ID RCNSTJ COLTRL LIGM IPHAL JT 1  W/GRF EA JT Left 8/23/2023    Procedure: RECONSTRUCTION UCL THUMB;  Surgeon: Almaz Stroud MD;  Location: [facility];  Service: Orthopedics    TONSILLECTOMY      WISDOM TOOTH EXTRACTION      WRIST SURGERY Left 2003    titanium plate     Family History   Problem Relation Age of Onset    Arthritis Mother     Hyperlipidemia Mother     Hypertension Father     Prostate cancer Father     Cancer Family     Diabetes Family     Heart disease Family     Other Family         hyperlipoprotein      reports that he has never smoked. He has been exposed to tobacco smoke. He has never used smokeless tobacco. He reports that he does not currently use alcohol. He reports that he does not use drugs.  Current Outpatient Medications   Medication Instructions    aspirin (ECOTRIN LOW STRENGTH) 81 mg, Oral, Daily    atorvastatin (LIPITOR) 40 mg, Oral, Daily at bedtime    multivitamin (THERAGRAN) TABS 1 tablet, Daily    nebivolol (BYSTOLIC) 2.5 mg, Oral, Daily    Omega-3 Fatty Acids (Fish Oil) 1200 MG CAPS Daily   No Known Allergies   Current Outpatient Medications on File Prior to Visit   Medication Sig Dispense Refill    aspirin (ECOTRIN LOW STRENGTH) 81 mg EC tablet Take 1 tablet (81 mg total) by mouth daily 30 tablet 0    atorvastatin (LIPITOR) 40 mg tablet TAKE ONE TABLET BY MOUTH EVERY DAY AT BEDTIME 90 tablet 1    multivitamin (THERAGRAN) TABS Take 1 tablet by mouth daily      Omega-3 Fatty Acids (Fish Oil) 1200 MG CAPS Take by mouth in the morning      [DISCONTINUED] lisinopril-hydrochlorothiazide (PRINZIDE,ZESTORETIC) 20-12.5 MG per tablet TAKE ONE TABLET BY MOUTH EVERY DAY (GENERIC FOR ZESTORETIC) 90 tablet 1    [DISCONTINUED] nebivolol (BYSTOLIC) 5 mg tablet Take 1 tablet (5 mg total) by mouth daily 90 tablet 3    [DISCONTINUED] neomycin-polymyxin-dexamethasone (MAXITROL) ophthalmic suspension Apply 1 drop to eye 4 (four) times a day 5 mL 0     No current facility-administered medications on file prior to visit.     "  Social History     Tobacco Use    Smoking status: Never     Passive exposure: Past    Smokeless tobacco: Never   Vaping Use    Vaping status: Never Used   Substance and Sexual Activity    Alcohol use: Not Currently     Comment: No lcoholic beverages since January 2023    Drug use: Never    Sexual activity: Yes     Partners: Female     Birth control/protection: None       Objective   /70   Pulse (!) 52   Temp (!) 96.9 °F (36.1 °C)   Resp 16   Ht 6' 1.62\" (1.87 m)   Wt 128 kg (283 lb)   BMI 36.71 kg/m²     Physical Exam  Vitals and nursing note reviewed.   Constitutional:       General: He is not in acute distress.     Appearance: He is well-developed. He is not diaphoretic.   HENT:      Head: Normocephalic and atraumatic.      Right Ear: External ear normal.      Left Ear: External ear normal.      Nose: Nose normal.      Mouth/Throat:      Pharynx: No oropharyngeal exudate.   Eyes:      General: No scleral icterus.        Right eye: No discharge.         Left eye: No discharge.      Pupils: Pupils are equal, round, and reactive to light.   Neck:      Thyroid: No thyromegaly.   Cardiovascular:      Rate and Rhythm: Normal rate.      Heart sounds: Normal heart sounds. No murmur heard.  Pulmonary:      Effort: Pulmonary effort is normal. No respiratory distress.      Breath sounds: Normal breath sounds. No wheezing.   Abdominal:      General: Bowel sounds are normal. There is no distension.      Palpations: Abdomen is soft. There is no mass.      Tenderness: There is no abdominal tenderness. There is no guarding or rebound.   Musculoskeletal:         General: Normal range of motion.   Skin:     General: Skin is warm and dry.      Findings: No erythema or rash.   Neurological:      Mental Status: He is alert.      Coordination: Coordination normal.      Deep Tendon Reflexes: Reflexes normal.   Psychiatric:         Behavior: Behavior normal.         "

## 2025-03-14 NOTE — ASSESSMENT & PLAN NOTE
No palps since he started his healthy living    Orders:    nebivolol (BYSTOLIC) 2.5 mg tablet; Take 1 tablet (2.5 mg total) by mouth daily

## 2025-03-14 NOTE — PATIENT INSTRUCTIONS
"Patient Education     Routine physical for adults   The Basics   Written by the doctors and editors at [facility]   What is a physical? -- A physical is a routine visit, or \"check-up,\" with your doctor. You might also hear it called a \"wellness visit\" or \"preventive visit.\"  During each visit, the doctor will:   Ask about your physical and mental health   Ask about your habits, behaviors, and lifestyle   Do an exam   Give you vaccines if needed   Talk to you about any medicines you take   Give advice about your health   Answer your questions  Getting regular check-ups is an important part of taking care of your health. It can help your doctor find and treat any problems you have. But it's also important for preventing health problems.  A routine physical is different from a \"sick visit.\" A sick visit is when you see a doctor because of a health concern or problem. Since physicals are scheduled ahead of time, you can think about what you want to ask the doctor.  How often should I get a physical? -- It depends on your age and health. In general, for people age 21 years and older:   If you are younger than 50 years, you might be able to get a physical every 3 years.   If you are 50 years or older, your doctor might recommend a physical every year.  If you have an ongoing health condition, like diabetes or high blood pressure, your doctor will probably want to see you more often.  What happens during a physical? -- In general, each visit will include:   Physical exam - The doctor or nurse will check your height, weight, heart rate, and blood pressure. They will also look at your eyes and ears. They will ask about how you are feeling and whether you have any symptoms that bother you.   Medicines - It's a good idea to bring a list of all the medicines you take to each doctor visit. Your doctor will talk to you about your medicines and answer any questions. Tell them if you are having any side effects that bother you. You " "should also tell them if you are having trouble paying for any of your medicines.   Habits and behaviors - This includes:   Your diet   Your exercise habits   Whether you smoke, drink alcohol, or use drugs   Whether you are sexually active   Whether you feel safe at home  Your doctor will talk to you about things you can do to improve your health and lower your risk of health problems. They will also offer help and support. For example, if you want to quit smoking, they can give you advice and might prescribe medicines. If you want to improve your diet or get more physical activity, they can help you with this, too.   Lab tests, if needed - The tests you get will depend on your age and situation. For example, your doctor might want to check your:   Cholesterol   Blood sugar   Iron level   Vaccines - The recommended vaccines will depend on your age, health, and what vaccines you already had. Vaccines are very important because they can prevent certain serious or deadly infections.   Discussion of screening - \"Screening\" means checking for diseases or other health problems before they cause symptoms. Your doctor can recommend screening based on your age, risk, and preferences. This might include tests to check for:   Cancer, such as breast, prostate, cervical, ovarian, colorectal, prostate, lung, or skin cancer   Sexually transmitted infections, such as chlamydia and gonorrhea   Mental health conditions like depression and anxiety  Your doctor will talk to you about the different types of screening tests. They can help you decide which screenings to have. They can also explain what the results might mean.   Answering questions - The physical is a good time to ask the doctor or nurse questions about your health. If needed, they can refer you to other doctors or specialists, too.  Adults older than 65 years often need other care, too. As you get older, your doctor will talk to you about:   How to prevent falling at " home   Hearing or vision tests   Memory testing   How to take your medicines safely   Making sure that you have the help and support you need at home  All topics are updated as new evidence becomes available and our peer review process is complete.  This topic retrieved from [employer] on: May 02, 2024.  Topic 776570 Version 1.0  Release: 32.4.3 - C32.122  © 2024 UpToDate, Inc. and/or its affiliates. All rights reserved.  Consumer Information Use and Disclaimer   Disclaimer: This generalized information is a limited summary of diagnosis, treatment, and/or medication information. It is not meant to be comprehensive and should be used as a tool to help the user understand and/or assess potential diagnostic and treatment options. It does NOT include all information about conditions, treatments, medications, side effects, or risks that may apply to a specific patient. It is not intended to be medical advice or a substitute for the medical advice, diagnosis, or treatment of a health care provider based on the health care provider's examination and assessment of a patient's specific and unique circumstances. Patients must speak with a health care provider for complete information about their health, medical questions, and treatment options, including any risks or benefits regarding use of medications. This information does not endorse any treatments or medications as safe, effective, or approved for treating a specific patient. UpToDate, Inc. and its affiliates disclaim any warranty or liability relating to this information or the use thereof.The use of this information is governed by the Terms of Use, available at https://www.[employer] Groupuwer.com/en/know/clinical-effectiveness-terms. 2024© UpToDate, Inc. and its affiliates and/or licensors. All rights reserved.  Copyright   © 2024 UpToDate, Inc. and/or its affiliates. All rights reserved.

## 2025-03-14 NOTE — ASSESSMENT & PLAN NOTE
Stable - pt ran out of the lisinopril hct a month and a half ago and never refilled it - so is not on that.  PT just taking the 5 of bystolic    Orders:    nebivolol (BYSTOLIC) 2.5 mg tablet; Take 1 tablet (2.5 mg total) by mouth daily

## 2025-03-19 DIAGNOSIS — E78.2 MIXED HYPERLIPIDEMIA: ICD-10-CM

## 2025-03-19 RX ORDER — ATORVASTATIN CALCIUM 40 MG/1
40 TABLET, FILM COATED ORAL
Qty: 90 TABLET | Refills: 1 | Status: SHIPPED | OUTPATIENT
Start: 2025-03-19

## 2025-05-13 ENCOUNTER — RESULTS FOLLOW-UP (OUTPATIENT)
Dept: FAMILY MEDICINE CLINIC | Facility: CLINIC | Age: 50
End: 2025-05-13

## 2025-05-13 LAB
ALBUMIN SERPL-MCNC: 4.7 G/DL (ref 4.1–5.1)
ALP SERPL-CCNC: 62 IU/L (ref 44–121)
ALT SERPL-CCNC: 36 IU/L (ref 0–44)
AST SERPL-CCNC: 30 IU/L (ref 0–40)
BILIRUB SERPL-MCNC: 1.5 MG/DL (ref 0–1.2)
BUN SERPL-MCNC: 15 MG/DL (ref 6–24)
BUN/CREAT SERPL: 14 (ref 9–20)
CALCIUM SERPL-MCNC: 10.2 MG/DL (ref 8.7–10.2)
CHLORIDE SERPL-SCNC: 106 MMOL/L (ref 96–106)
CHOLEST SERPL-MCNC: 142 MG/DL (ref 100–199)
CO2 SERPL-SCNC: 23 MMOL/L (ref 20–29)
CREAT SERPL-MCNC: 1.09 MG/DL (ref 0.76–1.27)
EGFR: 83 ML/MIN/1.73
GLOBULIN SER-MCNC: 2.2 G/DL (ref 1.5–4.5)
GLUCOSE SERPL-MCNC: 90 MG/DL (ref 70–99)
HDLC SERPL-MCNC: 41 MG/DL
LDL CALC COMMENT: NORMAL
LDLC SERPL CALC-MCNC: 78 MG/DL (ref 0–99)
LDLC SERPL DIRECT ASSAY-MCNC: 73 MG/DL (ref 0–99)
POTASSIUM SERPL-SCNC: 4.9 MMOL/L (ref 3.5–5.2)
PROT SERPL-MCNC: 6.9 G/DL (ref 6–8.5)
SL AMB VLDL CHOLESTEROL CALC: 23 MG/DL (ref 5–40)
SODIUM SERPL-SCNC: 143 MMOL/L (ref 134–144)
TRIGL SERPL-MCNC: 129 MG/DL (ref 0–149)

## 2025-05-16 ENCOUNTER — OFFICE VISIT (OUTPATIENT)
Dept: FAMILY MEDICINE CLINIC | Facility: CLINIC | Age: 50
End: 2025-05-16
Payer: COMMERCIAL

## 2025-05-16 VITALS
TEMPERATURE: 97.3 F | HEART RATE: 63 BPM | RESPIRATION RATE: 14 BRPM | BODY MASS INDEX: 29.13 KG/M2 | SYSTOLIC BLOOD PRESSURE: 126 MMHG | HEIGHT: 74 IN | DIASTOLIC BLOOD PRESSURE: 80 MMHG | OXYGEN SATURATION: 97 % | WEIGHT: 227 LBS

## 2025-05-16 DIAGNOSIS — I10 BENIGN ESSENTIAL HYPERTENSION: Primary | ICD-10-CM

## 2025-05-16 DIAGNOSIS — I48.0 PAF (PAROXYSMAL ATRIAL FIBRILLATION) (HCC): ICD-10-CM

## 2025-05-16 DIAGNOSIS — E78.2 MIXED HYPERLIPIDEMIA: ICD-10-CM

## 2025-05-16 DIAGNOSIS — E66.812 CLASS 2 SEVERE OBESITY DUE TO EXCESS CALORIES WITH SERIOUS COMORBIDITY AND BODY MASS INDEX (BMI) OF 38.0 TO 38.9 IN ADULT (HCC): ICD-10-CM

## 2025-05-16 DIAGNOSIS — K76.0 FATTY LIVER DISEASE, NONALCOHOLIC: ICD-10-CM

## 2025-05-16 DIAGNOSIS — E66.01 CLASS 2 SEVERE OBESITY DUE TO EXCESS CALORIES WITH SERIOUS COMORBIDITY AND BODY MASS INDEX (BMI) OF 38.0 TO 38.9 IN ADULT (HCC): ICD-10-CM

## 2025-05-16 PROBLEM — E87.6 HYPOKALEMIA: Status: RESOLVED | Noted: 2023-04-25 | Resolved: 2025-05-16

## 2025-05-16 PROBLEM — R11.2 PONV (POSTOPERATIVE NAUSEA AND VOMITING): Status: RESOLVED | Noted: 2023-08-23 | Resolved: 2025-05-16

## 2025-05-16 PROBLEM — Z98.890 PONV (POSTOPERATIVE NAUSEA AND VOMITING): Status: RESOLVED | Noted: 2023-08-23 | Resolved: 2025-05-16

## 2025-05-16 PROCEDURE — 99214 OFFICE O/P EST MOD 30 MIN: CPT | Performed by: FAMILY MEDICINE

## 2025-05-16 RX ORDER — TESTOSTERONE 20.25 MG/1.25G
300 GEL TOPICAL AS NEEDED
COMMUNITY
Start: 2024-08-07

## 2025-05-16 NOTE — ASSESSMENT & PLAN NOTE
Pt is on weight loss injection.   Does cause cause some nausea and gas.       Orders:  •  Comprehensive metabolic panel; Future  •  Lipid Panel with Direct LDL reflex; Future  •  CBC; Future  •  Hemoglobin A1C; Future

## 2025-05-16 NOTE — ASSESSMENT & PLAN NOTE
Lipids are acceptable  Orders:  •  Comprehensive metabolic panel; Future  •  Lipid Panel with Direct LDL reflex; Future  •  CBC; Future  •  Hemoglobin A1C; Future

## 2025-05-16 NOTE — ASSESSMENT & PLAN NOTE
stable  Orders:  •  Comprehensive metabolic panel; Future  •  Lipid Panel with Direct LDL reflex; Future  •  CBC; Future  •  Hemoglobin A1C; Future

## 2025-05-16 NOTE — PROGRESS NOTES
"Name: Sidney Jean      : 1975      MRN: [id number]  Encounter Provider: Frank Lombardi, DO  Encounter Date: 2025   Encounter department: [facility]  :  Assessment & Plan  Benign essential hypertension  stable  Orders:  •  Comprehensive metabolic panel; Future  •  Lipid Panel with Direct LDL reflex; Future  •  CBC; Future  •  Hemoglobin A1C; Future    PAF (paroxysmal atrial fibrillation) (HCC)  stable  Orders:  •  Comprehensive metabolic panel; Future  •  Lipid Panel with Direct LDL reflex; Future  •  CBC; Future  •  Hemoglobin A1C; Future    Mixed hyperlipidemia  Lipids are acceptable  Orders:  •  Comprehensive metabolic panel; Future  •  Lipid Panel with Direct LDL reflex; Future  •  CBC; Future  •  Hemoglobin A1C; Future    Class 2 severe obesity due to excess calories with serious comorbidity and body mass index (BMI) of 38.0 to 38.9 in adult (HCC)  Pt is on weight loss injection.   Does cause cause some nausea and gas.       Orders:  •  Comprehensive metabolic panel; Future  •  Lipid Panel with Direct LDL reflex; Future  •  CBC; Future  •  Hemoglobin A1C; Future    Fatty liver disease, nonalcoholic  Lft's are acceptable  Orders:  •  Comprehensive metabolic panel; Future  •  Lipid Panel with Direct LDL reflex; Future  •  CBC; Future  •  Hemoglobin A1C; Future           History of Present Illness   Pt is sched for \"appointment\"  PT states he is here to review labs        Review of Systems   Constitutional:  Negative for activity change, appetite change, chills, diaphoresis, fatigue, fever and unexpected weight change.   HENT:  Negative for congestion, dental problem, ear pain, mouth sores, sinus pressure, sinus pain, sore throat and trouble swallowing.    Eyes:  Negative for photophobia, discharge and itching.   Respiratory:  Negative for apnea, chest tightness and shortness of breath.    Cardiovascular:  Negative for chest pain, palpitations and leg swelling.   Gastrointestinal:  " "Negative for abdominal distention, abdominal pain, blood in stool, nausea and vomiting.   Endocrine: Negative for cold intolerance, heat intolerance, polydipsia, polyphagia and polyuria.   Genitourinary:  Negative for difficulty urinating.   Musculoskeletal:  Negative for arthralgias.   Skin:  Negative for color change and wound.   Neurological:  Negative for dizziness, syncope, speech difficulty and headaches.   Hematological:  Negative for adenopathy.   Psychiatric/Behavioral:  Negative for agitation and behavioral problems.        Objective   /80   Pulse 63   Temp (!) 97.3 °F (36.3 °C)   Resp 14   Ht 6' 1.62\" (1.87 m)   Wt 103 kg (227 lb)   SpO2 97%   BMI 29.45 kg/m²      Physical Exam  Vitals and nursing note reviewed.   Constitutional:       General: He is not in acute distress.     Appearance: He is well-developed. He is not diaphoretic.   HENT:      Head: Normocephalic and atraumatic.      Right Ear: External ear normal.      Left Ear: External ear normal.      Nose: Nose normal.      Mouth/Throat:      Pharynx: No oropharyngeal exudate.     Eyes:      General: No scleral icterus.        Right eye: No discharge.         Left eye: No discharge.      Pupils: Pupils are equal, round, and reactive to light.     Neck:      Thyroid: No thyromegaly.     Cardiovascular:      Rate and Rhythm: Normal rate.      Heart sounds: Normal heart sounds. No murmur heard.  Pulmonary:      Effort: Pulmonary effort is normal. No respiratory distress.      Breath sounds: Normal breath sounds. No wheezing.   Abdominal:      General: Bowel sounds are normal. There is no distension.      Palpations: Abdomen is soft. There is no mass.      Tenderness: There is no abdominal tenderness. There is no guarding or rebound.     Musculoskeletal:         General: Normal range of motion.     Skin:     General: Skin is warm and dry.      Findings: No erythema or rash.     Neurological:      Mental Status: He is alert.      " Coordination: Coordination normal.      Deep Tendon Reflexes: Reflexes normal.     Psychiatric:         Behavior: Behavior normal.

## 2025-05-16 NOTE — ASSESSMENT & PLAN NOTE
Lft's are acceptable  Orders:  •  Comprehensive metabolic panel; Future  •  Lipid Panel with Direct LDL reflex; Future  •  CBC; Future  •  Hemoglobin A1C; Future

## 2025-05-25 DIAGNOSIS — I10 BENIGN ESSENTIAL HYPERTENSION: ICD-10-CM

## 2025-05-25 DIAGNOSIS — I48.0 PAF (PAROXYSMAL ATRIAL FIBRILLATION) (HCC): ICD-10-CM

## 2025-05-25 RX ORDER — NEBIVOLOL 5 MG/1
TABLET ORAL
Qty: 90 TABLET | Refills: 3 | OUTPATIENT
Start: 2025-05-25

## (undated) DEVICE — CAST PADDING 4 IN SYNTHETIC NON-STRL

## (undated) DEVICE — BANDAGE, ESMARK LF STR 4"X9'(20/CS): Brand: CYPRESS

## (undated) DEVICE — NEEDLE 25G X 1 1/2

## (undated) DEVICE — ANTIBACTERIAL UNDYED BRAIDED (POLYGLACTIN 910), SYNTHETIC ABSORBABLE SUTURE: Brand: COATED VICRYL

## (undated) DEVICE — DRAPE SHEET THREE QUARTER

## (undated) DEVICE — ASTOUND STANDARD SURGICAL GOWN, XL: Brand: CONVERTORS

## (undated) DEVICE — ACE WRAP 4 IN UNSTERILE

## (undated) DEVICE — OCCLUSIVE GAUZE STRIP,3% BISMUTH TRIBROMOPHENATE IN PETROLATUM BLEND: Brand: XEROFORM

## (undated) DEVICE — PACK GENERAL LF

## (undated) DEVICE — SUT ETHILON 4-0 PS-2 18 IN 1667G

## (undated) DEVICE — KIT F/SWIVELOCK SL 3.5 X 8.5MM DISP

## (undated) DEVICE — SYRINGE 10ML LL

## (undated) DEVICE — SUT FIBERWIRE 3-0 3/8 CIRCLE T-43 18IN AR-7227-01

## (undated) DEVICE — SKIN MARKER DUAL TIP WITH RULER CAP, FLEXIBLE RULER AND LABELS: Brand: DEVON

## (undated) DEVICE — CUFF TOURNIQUET 18 X 4 IN QUICK CONNECT DISP 1 BLADDER

## (undated) DEVICE — INTENDED FOR TISSUE SEPARATION, AND OTHER PROCEDURES THAT REQUIRE A SHARP SURGICAL BLADE TO PUNCTURE OR CUT.: Brand: BARD-PARKER ® CARBON RIB-BACK BLADES

## (undated) DEVICE — TIBURON HAND DRAPE: Brand: CONVERTORS

## (undated) DEVICE — DISPOSABLE EQUIPMENT COVER: Brand: SMALL TOWEL DRAPE

## (undated) DEVICE — KERLIX BANDAGE ROLL: Brand: KERLIX

## (undated) DEVICE — SPONGE GAUZE 4 X 9

## (undated) DEVICE — BASIC DOUBLE BASIN 2-LF: Brand: MEDLINE INDUSTRIES, INC.

## (undated) DEVICE — CHLORAPREP HI-LITE 26ML ORANGE

## (undated) DEVICE — GLOVE INDICATOR PI UNDERGLOVE SZ 7 BLUE

## (undated) DEVICE — PAD CAST 4 IN COTTON NON STERILE

## (undated) DEVICE — GLOVE SRG BIOGEL 7

## (undated) DEVICE — ACE WRAP 3 IN UNSTERILE